# Patient Record
Sex: FEMALE | Race: BLACK OR AFRICAN AMERICAN | NOT HISPANIC OR LATINO | Employment: OTHER | URBAN - METROPOLITAN AREA
[De-identification: names, ages, dates, MRNs, and addresses within clinical notes are randomized per-mention and may not be internally consistent; named-entity substitution may affect disease eponyms.]

---

## 2023-05-03 ENCOUNTER — HOSPITAL ENCOUNTER (INPATIENT)
Facility: HOSPITAL | Age: 48
LOS: 13 days | Discharge: HOME HEALTH | DRG: 064 | End: 2023-05-16
Attending: FAMILY MEDICINE | Admitting: INTERNAL MEDICINE
Payer: MEDICAID

## 2023-05-03 DIAGNOSIS — R79.89 ELEVATED TROPONIN: ICD-10-CM

## 2023-05-03 DIAGNOSIS — R65.20 SEVERE SEPSIS: ICD-10-CM

## 2023-05-03 DIAGNOSIS — A41.9 SEVERE SEPSIS: ICD-10-CM

## 2023-05-03 DIAGNOSIS — R41.82 ALTERED MENTAL STATUS: ICD-10-CM

## 2023-05-03 DIAGNOSIS — L02.212 BACK ABSCESS: ICD-10-CM

## 2023-05-03 DIAGNOSIS — I63.40 CEREBROVASCULAR ACCIDENT (CVA) DUE TO EMBOLISM OF CEREBRAL ARTERY: Primary | ICD-10-CM

## 2023-05-03 DIAGNOSIS — I21.4 NSTEMI (NON-ST ELEVATED MYOCARDIAL INFARCTION): ICD-10-CM

## 2023-05-03 DIAGNOSIS — A41.9 SEPSIS, DUE TO UNSPECIFIED ORGANISM, UNSPECIFIED WHETHER ACUTE ORGAN DYSFUNCTION PRESENT: ICD-10-CM

## 2023-05-03 DIAGNOSIS — N17.9 AKI (ACUTE KIDNEY INJURY): ICD-10-CM

## 2023-05-03 DIAGNOSIS — R74.01 TRANSAMINITIS: ICD-10-CM

## 2023-05-03 DIAGNOSIS — G93.41 ACUTE METABOLIC ENCEPHALOPATHY: ICD-10-CM

## 2023-05-03 DIAGNOSIS — I63.9 EMBOLIC STROKE: ICD-10-CM

## 2023-05-03 LAB
BASOPHILS # BLD AUTO: 0.1 K/UL (ref 0–0.2)
BASOPHILS NFR BLD: 0.5 % (ref 0–1.9)
BNP SERPL-MCNC: 117 PG/ML (ref 0–99)
DIFFERENTIAL METHOD: ABNORMAL
EOSINOPHIL # BLD AUTO: 0.1 K/UL (ref 0–0.5)
EOSINOPHIL NFR BLD: 0.4 % (ref 0–8)
ERYTHROCYTE [DISTWIDTH] IN BLOOD BY AUTOMATED COUNT: 13.9 % (ref 11.5–14.5)
HCT VFR BLD AUTO: 28.4 % (ref 37–48.5)
HGB BLD-MCNC: 9.3 G/DL (ref 12–16)
IMM GRANULOCYTES # BLD AUTO: 0.56 K/UL (ref 0–0.04)
IMM GRANULOCYTES NFR BLD AUTO: 2.7 % (ref 0–0.5)
LACTATE SERPL-SCNC: 1.3 MMOL/L (ref 0.5–2.2)
LYMPHOCYTES # BLD AUTO: 1.4 K/UL (ref 1–4.8)
LYMPHOCYTES NFR BLD: 6.8 % (ref 18–48)
MCH RBC QN AUTO: 21.6 PG (ref 27–31)
MCHC RBC AUTO-ENTMCNC: 32.7 G/DL (ref 32–36)
MCV RBC AUTO: 66 FL (ref 82–98)
MONOCYTES # BLD AUTO: 0.8 K/UL (ref 0.3–1)
MONOCYTES NFR BLD: 4.1 % (ref 4–15)
NEUTROPHILS # BLD AUTO: 17.7 K/UL (ref 1.8–7.7)
NEUTROPHILS NFR BLD: 85.5 % (ref 38–73)
NRBC BLD-RTO: 1 /100 WBC
PLATELET # BLD AUTO: 499 K/UL (ref 150–450)
PMV BLD AUTO: 11.4 FL (ref 9.2–12.9)
RBC # BLD AUTO: 4.3 M/UL (ref 4–5.4)
TROPONIN I SERPL DL<=0.01 NG/ML-MCNC: 0.26 NG/ML (ref 0–0.03)
WBC # BLD AUTO: 20.65 K/UL (ref 3.9–12.7)

## 2023-05-03 PROCEDURE — 87040 BLOOD CULTURE FOR BACTERIA: CPT | Mod: 59 | Performed by: FAMILY MEDICINE

## 2023-05-03 PROCEDURE — 84484 ASSAY OF TROPONIN QUANT: CPT | Performed by: FAMILY MEDICINE

## 2023-05-03 PROCEDURE — 51701 INSERT BLADDER CATHETER: CPT

## 2023-05-03 PROCEDURE — 25000003 PHARM REV CODE 250: Performed by: FAMILY MEDICINE

## 2023-05-03 PROCEDURE — 96375 TX/PRO/DX INJ NEW DRUG ADDON: CPT

## 2023-05-03 PROCEDURE — 11000001 HC ACUTE MED/SURG PRIVATE ROOM

## 2023-05-03 PROCEDURE — 93005 ELECTROCARDIOGRAM TRACING: CPT

## 2023-05-03 PROCEDURE — 80053 COMPREHEN METABOLIC PANEL: CPT | Performed by: FAMILY MEDICINE

## 2023-05-03 PROCEDURE — 93010 EKG 12-LEAD: ICD-10-PCS | Mod: ,,, | Performed by: STUDENT IN AN ORGANIZED HEALTH CARE EDUCATION/TRAINING PROGRAM

## 2023-05-03 PROCEDURE — 99285 EMERGENCY DEPT VISIT HI MDM: CPT | Mod: 25

## 2023-05-03 PROCEDURE — 93010 ELECTROCARDIOGRAM REPORT: CPT | Mod: ,,, | Performed by: STUDENT IN AN ORGANIZED HEALTH CARE EDUCATION/TRAINING PROGRAM

## 2023-05-03 PROCEDURE — 63600175 PHARM REV CODE 636 W HCPCS: Performed by: FAMILY MEDICINE

## 2023-05-03 PROCEDURE — 84145 PROCALCITONIN (PCT): CPT | Performed by: FAMILY MEDICINE

## 2023-05-03 PROCEDURE — 85025 COMPLETE CBC W/AUTO DIFF WBC: CPT | Performed by: FAMILY MEDICINE

## 2023-05-03 PROCEDURE — 82962 GLUCOSE BLOOD TEST: CPT

## 2023-05-03 PROCEDURE — 96365 THER/PROPH/DIAG IV INF INIT: CPT

## 2023-05-03 PROCEDURE — 83605 ASSAY OF LACTIC ACID: CPT | Performed by: FAMILY MEDICINE

## 2023-05-03 PROCEDURE — 83880 ASSAY OF NATRIURETIC PEPTIDE: CPT | Performed by: FAMILY MEDICINE

## 2023-05-03 RX ORDER — HYDRALAZINE HYDROCHLORIDE 20 MG/ML
10 INJECTION INTRAMUSCULAR; INTRAVENOUS
Status: COMPLETED | OUTPATIENT
Start: 2023-05-03 | End: 2023-05-03

## 2023-05-03 RX ADMIN — PIPERACILLIN SODIUM AND TAZOBACTAM SODIUM 4.5 G: 4; .5 INJECTION, POWDER, FOR SOLUTION INTRAVENOUS at 11:05

## 2023-05-03 RX ADMIN — SODIUM CHLORIDE 1000 ML: 9 INJECTION, SOLUTION INTRAVENOUS at 11:05

## 2023-05-03 RX ADMIN — HYDRALAZINE HYDROCHLORIDE 10 MG: 20 INJECTION, SOLUTION INTRAMUSCULAR; INTRAVENOUS at 11:05

## 2023-05-03 NOTE — Clinical Note
Diagnosis: Sepsis, due to unspecified organism, unspecified whether acute organ dysfunction present [[id number]]   Future Attending Provider: DEANNA POPE [[id number]]   Admitting Provider:: DEANNA POPE [[id number]]

## 2023-05-04 PROBLEM — R65.20 SEVERE SEPSIS: Status: ACTIVE | Noted: 2023-05-04

## 2023-05-04 PROBLEM — G93.41 ACUTE METABOLIC ENCEPHALOPATHY: Status: ACTIVE | Noted: 2023-05-04

## 2023-05-04 PROBLEM — R79.89 ELEVATED TROPONIN: Status: ACTIVE | Noted: 2023-05-04

## 2023-05-04 PROBLEM — R79.89 ELEVATED LFTS: Status: ACTIVE | Noted: 2023-05-04

## 2023-05-04 PROBLEM — L02.212 BACK ABSCESS: Status: ACTIVE | Noted: 2023-05-04

## 2023-05-04 PROBLEM — R73.9 HYPERGLYCEMIA: Status: ACTIVE | Noted: 2023-05-04

## 2023-05-04 PROBLEM — A41.9 SEVERE SEPSIS: Status: ACTIVE | Noted: 2023-05-04

## 2023-05-04 PROBLEM — E87.29 HIGH ANION GAP METABOLIC ACIDOSIS: Status: ACTIVE | Noted: 2023-05-04

## 2023-05-04 PROBLEM — E66.01 MORBID OBESITY WITH BMI OF 45.0-49.9, ADULT: Status: ACTIVE | Noted: 2023-05-04

## 2023-05-04 PROBLEM — N17.9 AKI (ACUTE KIDNEY INJURY): Status: ACTIVE | Noted: 2023-05-04

## 2023-05-04 PROBLEM — E11.65 TYPE 2 DIABETES MELLITUS WITH HYPERGLYCEMIA: Status: ACTIVE | Noted: 2023-05-04

## 2023-05-04 LAB
ALBUMIN SERPL BCP-MCNC: 2.1 G/DL (ref 3.5–5.2)
ALBUMIN SERPL BCP-MCNC: 2.3 G/DL (ref 3.5–5.2)
ALP SERPL-CCNC: 155 U/L (ref 55–135)
ALP SERPL-CCNC: 172 U/L (ref 55–135)
ALT SERPL W/O P-5'-P-CCNC: 151 U/L (ref 10–44)
ALT SERPL W/O P-5'-P-CCNC: 168 U/L (ref 10–44)
AMPHET+METHAMPHET UR QL: NEGATIVE
ANION GAP SERPL CALC-SCNC: 17 MMOL/L (ref 8–16)
ANION GAP SERPL CALC-SCNC: 17 MMOL/L (ref 8–16)
ANION GAP SERPL CALC-SCNC: 20 MMOL/L (ref 8–16)
AORTIC ROOT ANNULUS: 2.97 CM
ASCENDING AORTA: 3.06 CM
AST SERPL-CCNC: 350 U/L (ref 10–40)
AST SERPL-CCNC: 449 U/L (ref 10–40)
AV INDEX (PROSTH): 0.84
AV MEAN GRADIENT: 11 MMHG
AV PEAK GRADIENT: 14 MMHG
AV VALVE AREA: 2.66 CM2
AV VELOCITY RATIO: 0.79
BACTERIA #/AREA URNS HPF: ABNORMAL /HPF
BARBITURATES UR QL SCN>200 NG/ML: NEGATIVE
BASOPHILS # BLD AUTO: 0.09 K/UL (ref 0–0.2)
BASOPHILS NFR BLD: 0.4 % (ref 0–1.9)
BENZODIAZ UR QL SCN>200 NG/ML: NEGATIVE
BILIRUB SERPL-MCNC: 1.3 MG/DL (ref 0.1–1)
BILIRUB SERPL-MCNC: 1.3 MG/DL (ref 0.1–1)
BILIRUB UR QL STRIP: NEGATIVE
BSA FOR ECHO PROCEDURE: 2.39 M2
BUN SERPL-MCNC: 60 MG/DL (ref 6–20)
BUN SERPL-MCNC: 61 MG/DL (ref 6–20)
BUN SERPL-MCNC: 65 MG/DL (ref 6–20)
BZE UR QL SCN: NEGATIVE
C3 SERPL-MCNC: 151 MG/DL (ref 50–180)
C4 SERPL-MCNC: 12 MG/DL (ref 11–44)
CALCIUM SERPL-MCNC: 8.4 MG/DL (ref 8.7–10.5)
CALCIUM SERPL-MCNC: 8.7 MG/DL (ref 8.7–10.5)
CALCIUM SERPL-MCNC: 9.5 MG/DL (ref 8.7–10.5)
CANNABINOIDS UR QL SCN: NEGATIVE
CHLORIDE SERPL-SCNC: 103 MMOL/L (ref 95–110)
CHLORIDE SERPL-SCNC: 106 MMOL/L (ref 95–110)
CHLORIDE SERPL-SCNC: 108 MMOL/L (ref 95–110)
CHOLEST SERPL-MCNC: 142 MG/DL (ref 120–199)
CHOLEST/HDLC SERPL: 23.7 {RATIO} (ref 2–5)
CK SERPL-CCNC: 3423 U/L (ref 20–180)
CLARITY UR: ABNORMAL
CO2 SERPL-SCNC: 21 MMOL/L (ref 23–29)
COLOR UR: YELLOW
CREAT SERPL-MCNC: 2.7 MG/DL (ref 0.5–1.4)
CREAT SERPL-MCNC: 3.1 MG/DL (ref 0.5–1.4)
CREAT SERPL-MCNC: 3.5 MG/DL (ref 0.5–1.4)
CREAT UR-MCNC: 133.9 MG/DL (ref 15–325)
CREAT UR-MCNC: 133.9 MG/DL (ref 15–325)
CREAT UR-MCNC: 213.6 MG/DL (ref 15–325)
CV ECHO LV RWT: 0.73 CM
DIFFERENTIAL METHOD: ABNORMAL
DOP CALC AO PEAK VEL: 1.89 M/S
DOP CALC AO VTI: 30.9 CM
DOP CALC LVOT AREA: 3.2 CM2
DOP CALC LVOT DIAMETER: 2.01 CM
DOP CALC LVOT PEAK VEL: 1.49 M/S
DOP CALC LVOT STROKE VOLUME: 82.14 CM3
DOP CALC RVOT PEAK VEL: 1.26 M/S
DOP CALC RVOT VTI: 23.8 CM
DOP CALCLVOT PEAK VEL VTI: 25.9 CM
E WAVE DECELERATION TIME: 245.08 MSEC
E/A RATIO: 1.04
E/E' RATIO: 11.53 M/S
ECHO LV POSTERIOR WALL: 1.71 CM (ref 0.6–1.1)
EJECTION FRACTION: 65 %
EOSINOPHIL # BLD AUTO: 0.1 K/UL (ref 0–0.5)
EOSINOPHIL NFR BLD: 0.4 % (ref 0–8)
EOSINOPHIL URNS QL WRIGHT STN: NORMAL
ERYTHROCYTE [DISTWIDTH] IN BLOOD BY AUTOMATED COUNT: 13.5 % (ref 11.5–14.5)
EST. GFR  (NO RACE VARIABLE): 16 ML/MIN/1.73 M^2
EST. GFR  (NO RACE VARIABLE): 18 ML/MIN/1.73 M^2
EST. GFR  (NO RACE VARIABLE): 21 ML/MIN/1.73 M^2
ESTIMATED AVG GLUCOSE: 157 MG/DL (ref 68–131)
FRACTIONAL SHORTENING: 36 % (ref 28–44)
GLUCOSE SERPL-MCNC: 162 MG/DL (ref 70–110)
GLUCOSE SERPL-MCNC: 172 MG/DL (ref 70–110)
GLUCOSE SERPL-MCNC: 176 MG/DL (ref 70–110)
GLUCOSE UR QL STRIP: NEGATIVE
HAV IGM SERPL QL IA: NORMAL
HBA1C MFR BLD: 7.1 % (ref 4–5.6)
HBV CORE IGM SERPL QL IA: NORMAL
HBV SURFACE AG SERPL QL IA: NORMAL
HCT VFR BLD AUTO: 25.3 % (ref 37–48.5)
HCV AB SERPL QL IA: NORMAL
HDLC SERPL-MCNC: 6 MG/DL (ref 40–75)
HDLC SERPL: 4.2 % (ref 20–50)
HGB BLD-MCNC: 8.5 G/DL (ref 12–16)
HGB UR QL STRIP: ABNORMAL
HIV 1+2 AB+HIV1 P24 AG SERPL QL IA: NEGATIVE
HYALINE CASTS #/AREA URNS LPF: 3 /LPF
IMM GRANULOCYTES # BLD AUTO: 0.96 K/UL (ref 0–0.04)
IMM GRANULOCYTES NFR BLD AUTO: 4.7 % (ref 0–0.5)
INTERVENTRICULAR SEPTUM: 1.66 CM (ref 0.6–1.1)
IVC DIAMETER: 1.4 CM
IVRT: 79.92 MSEC
KETONES UR QL STRIP: NEGATIVE
LA MAJOR: 4.79 CM
LA MINOR: 5.09 CM
LA WIDTH: 3.8 CM
LDH SERPL L TO P-CCNC: 576 U/L (ref 110–260)
LDLC SERPL CALC-MCNC: 80.8 MG/DL (ref 63–159)
LEFT ATRIUM SIZE: 3.75 CM
LEFT ATRIUM VOLUME INDEX: 26.6 ML/M2
LEFT ATRIUM VOLUME: 59.78 CM3
LEFT INTERNAL DIMENSION IN SYSTOLE: 3.01 CM (ref 2.1–4)
LEFT VENTRICLE DIASTOLIC VOLUME INDEX: 44.89 ML/M2
LEFT VENTRICLE DIASTOLIC VOLUME: 101.01 ML
LEFT VENTRICLE MASS INDEX: 155 G/M2
LEFT VENTRICLE SYSTOLIC VOLUME INDEX: 15.7 ML/M2
LEFT VENTRICLE SYSTOLIC VOLUME: 35.32 ML
LEFT VENTRICULAR INTERNAL DIMENSION IN DIASTOLE: 4.67 CM (ref 3.5–6)
LEFT VENTRICULAR MASS: 348.27 G
LEUKOCYTE ESTERASE UR QL STRIP: ABNORMAL
LV LATERAL E/E' RATIO: 9.8 M/S
LV SEPTAL E/E' RATIO: 14 M/S
LVOT MG: 7.71 MMHG
LVOT MV: 1.39 CM/S
LYMPHOCYTES # BLD AUTO: 1.4 K/UL (ref 1–4.8)
LYMPHOCYTES NFR BLD: 7 % (ref 18–48)
MAGNESIUM SERPL-MCNC: 2.6 MG/DL (ref 1.6–2.6)
MCH RBC QN AUTO: 21.7 PG (ref 27–31)
MCHC RBC AUTO-ENTMCNC: 33.6 G/DL (ref 32–36)
MCV RBC AUTO: 65 FL (ref 82–98)
METHADONE UR QL SCN>300 NG/ML: NEGATIVE
MICROSCOPIC COMMENT: ABNORMAL
MONOCYTES # BLD AUTO: 0.9 K/UL (ref 0.3–1)
MONOCYTES NFR BLD: 4.5 % (ref 4–15)
MV PEAK A VEL: 0.94 M/S
MV PEAK E VEL: 0.98 M/S
MV STENOSIS PRESSURE HALF TIME: 71.07 MS
MV VALVE AREA P 1/2 METHOD: 3.1 CM2
NEUTROPHILS # BLD AUTO: 17 K/UL (ref 1.8–7.7)
NEUTROPHILS NFR BLD: 83 % (ref 38–73)
NITRITE UR QL STRIP: NEGATIVE
NONHDLC SERPL-MCNC: 136 MG/DL
NRBC BLD-RTO: 1 /100 WBC
OPIATES UR QL SCN: ABNORMAL
PCP UR QL SCN>25 NG/ML: NEGATIVE
PH UR STRIP: 5 [PH] (ref 5–8)
PISA TR MAX VEL: 2.44 M/S
PLATELET # BLD AUTO: 489 K/UL (ref 150–450)
PMV BLD AUTO: 10.9 FL (ref 9.2–12.9)
POCT GLUCOSE: 167 MG/DL (ref 70–110)
POCT GLUCOSE: 170 MG/DL (ref 70–110)
POCT GLUCOSE: 194 MG/DL (ref 70–110)
POCT GLUCOSE: 197 MG/DL (ref 70–110)
POCT GLUCOSE: 202 MG/DL (ref 70–110)
POTASSIUM SERPL-SCNC: 3.8 MMOL/L (ref 3.5–5.1)
POTASSIUM SERPL-SCNC: 3.9 MMOL/L (ref 3.5–5.1)
POTASSIUM SERPL-SCNC: 4.6 MMOL/L (ref 3.5–5.1)
PROCALCITONIN SERPL IA-MCNC: 4.89 NG/ML
PROT SERPL-MCNC: 7.7 G/DL (ref 6–8.4)
PROT SERPL-MCNC: 8.6 G/DL (ref 6–8.4)
PROT UR QL STRIP: ABNORMAL
PROT UR-MCNC: 31 MG/DL (ref 0–15)
PROT/CREAT UR: 0.23 MG/G{CREAT} (ref 0–0.2)
PV MEAN GRADIENT: 4.54 MMHG
RA MAJOR: 3.88 CM
RA PRESSURE: 3 MMHG
RA WIDTH: 2.5 CM
RBC # BLD AUTO: 3.91 M/UL (ref 4–5.4)
RBC #/AREA URNS HPF: 1 /HPF (ref 0–4)
RETICS/RBC NFR AUTO: 0.7 % (ref 0.5–2.5)
SODIUM SERPL-SCNC: 144 MMOL/L (ref 136–145)
SODIUM SERPL-SCNC: 144 MMOL/L (ref 136–145)
SODIUM SERPL-SCNC: 146 MMOL/L (ref 136–145)
SODIUM UR-SCNC: 44 MMOL/L (ref 20–250)
SP GR UR STRIP: 1.02 (ref 1–1.03)
SQUAMOUS #/AREA URNS HPF: 18 /HPF
STJ: 3.17 CM
TDI LATERAL: 0.1 M/S
TDI SEPTAL: 0.07 M/S
TDI: 0.09 M/S
TOXICOLOGY INFORMATION: ABNORMAL
TR MAX PG: 24 MMHG
TRICUSPID ANNULAR PLANE SYSTOLIC EXCURSION: 2 CM
TRIGL SERPL-MCNC: 276 MG/DL (ref 30–150)
TROPONIN I SERPL DL<=0.01 NG/ML-MCNC: 0.19 NG/ML (ref 0–0.03)
TROPONIN I SERPL DL<=0.01 NG/ML-MCNC: 0.28 NG/ML (ref 0–0.03)
TSH SERPL DL<=0.005 MIU/L-ACNC: 2.69 UIU/ML (ref 0.4–4)
TV REST PULMONARY ARTERY PRESSURE: 27 MMHG
URN SPEC COLLECT METH UR: ABNORMAL
UROBILINOGEN UR STRIP-ACNC: ABNORMAL EU/DL
WBC # BLD AUTO: 20.45 K/UL (ref 3.9–12.7)
WBC #/AREA URNS HPF: 14 /HPF (ref 0–5)
WBC CLUMPS URNS QL MICRO: ABNORMAL

## 2023-05-04 PROCEDURE — 99223 1ST HOSP IP/OBS HIGH 75: CPT | Mod: ,,, | Performed by: COLON & RECTAL SURGERY

## 2023-05-04 PROCEDURE — 87205 SMEAR GRAM STAIN: CPT | Performed by: INTERNAL MEDICINE

## 2023-05-04 PROCEDURE — 25000003 PHARM REV CODE 250: Performed by: INTERNAL MEDICINE

## 2023-05-04 PROCEDURE — 36415 COLL VENOUS BLD VENIPUNCTURE: CPT | Performed by: INTERNAL MEDICINE

## 2023-05-04 PROCEDURE — 85045 AUTOMATED RETICULOCYTE COUNT: CPT | Performed by: INTERNAL MEDICINE

## 2023-05-04 PROCEDURE — 80053 COMPREHEN METABOLIC PANEL: CPT | Performed by: INTERNAL MEDICINE

## 2023-05-04 PROCEDURE — 84443 ASSAY THYROID STIM HORMONE: CPT | Performed by: INTERNAL MEDICINE

## 2023-05-04 PROCEDURE — 25000003 PHARM REV CODE 250: Performed by: FAMILY MEDICINE

## 2023-05-04 PROCEDURE — 97530 THERAPEUTIC ACTIVITIES: CPT

## 2023-05-04 PROCEDURE — 83036 HEMOGLOBIN GLYCOSYLATED A1C: CPT | Performed by: INTERNAL MEDICINE

## 2023-05-04 PROCEDURE — 80074 ACUTE HEPATITIS PANEL: CPT | Performed by: INTERNAL MEDICINE

## 2023-05-04 PROCEDURE — 63600175 PHARM REV CODE 636 W HCPCS: Performed by: INTERNAL MEDICINE

## 2023-05-04 PROCEDURE — 80048 BASIC METABOLIC PNL TOTAL CA: CPT | Mod: XB | Performed by: INTERNAL MEDICINE

## 2023-05-04 PROCEDURE — 87086 URINE CULTURE/COLONY COUNT: CPT | Performed by: FAMILY MEDICINE

## 2023-05-04 PROCEDURE — 84300 ASSAY OF URINE SODIUM: CPT | Performed by: INTERNAL MEDICINE

## 2023-05-04 PROCEDURE — 86038 ANTINUCLEAR ANTIBODIES: CPT | Performed by: INTERNAL MEDICINE

## 2023-05-04 PROCEDURE — 99223 PR INITIAL HOSPITAL CARE,LEVL III: ICD-10-PCS | Mod: ,,, | Performed by: COLON & RECTAL SURGERY

## 2023-05-04 PROCEDURE — 83735 ASSAY OF MAGNESIUM: CPT | Performed by: INTERNAL MEDICINE

## 2023-05-04 PROCEDURE — 80061 LIPID PANEL: CPT | Performed by: INTERNAL MEDICINE

## 2023-05-04 PROCEDURE — 99223 PR INITIAL HOSPITAL CARE,LEVL III: ICD-10-PCS | Mod: ,,, | Performed by: INTERNAL MEDICINE

## 2023-05-04 PROCEDURE — 87389 HIV-1 AG W/HIV-1&-2 AB AG IA: CPT | Performed by: INTERNAL MEDICINE

## 2023-05-04 PROCEDURE — 80307 DRUG TEST PRSMV CHEM ANLYZR: CPT | Performed by: FAMILY MEDICINE

## 2023-05-04 PROCEDURE — 92610 EVALUATE SWALLOWING FUNCTION: CPT

## 2023-05-04 PROCEDURE — 82570 ASSAY OF URINE CREATININE: CPT | Performed by: INTERNAL MEDICINE

## 2023-05-04 PROCEDURE — 81000 URINALYSIS NONAUTO W/SCOPE: CPT | Mod: 59 | Performed by: FAMILY MEDICINE

## 2023-05-04 PROCEDURE — 92523 SPEECH SOUND LANG COMPREHEN: CPT

## 2023-05-04 PROCEDURE — 83615 LACTATE (LD) (LDH) ENZYME: CPT | Performed by: INTERNAL MEDICINE

## 2023-05-04 PROCEDURE — 84484 ASSAY OF TROPONIN QUANT: CPT | Mod: 91 | Performed by: INTERNAL MEDICINE

## 2023-05-04 PROCEDURE — 97162 PT EVAL MOD COMPLEX 30 MIN: CPT

## 2023-05-04 PROCEDURE — 97166 OT EVAL MOD COMPLEX 45 MIN: CPT

## 2023-05-04 PROCEDURE — 99223 1ST HOSP IP/OBS HIGH 75: CPT | Mod: ,,, | Performed by: INTERNAL MEDICINE

## 2023-05-04 PROCEDURE — 86160 COMPLEMENT ANTIGEN: CPT | Mod: 59 | Performed by: INTERNAL MEDICINE

## 2023-05-04 PROCEDURE — 85025 COMPLETE CBC W/AUTO DIFF WBC: CPT | Performed by: INTERNAL MEDICINE

## 2023-05-04 PROCEDURE — 86160 COMPLEMENT ANTIGEN: CPT | Performed by: INTERNAL MEDICINE

## 2023-05-04 PROCEDURE — 82550 ASSAY OF CK (CPK): CPT | Performed by: INTERNAL MEDICINE

## 2023-05-04 PROCEDURE — 21400001 HC TELEMETRY ROOM

## 2023-05-04 RX ORDER — HYDRALAZINE HYDROCHLORIDE 20 MG/ML
10 INJECTION INTRAMUSCULAR; INTRAVENOUS EVERY 8 HOURS PRN
Status: DISCONTINUED | OUTPATIENT
Start: 2023-05-04 | End: 2023-05-06

## 2023-05-04 RX ORDER — SODIUM CHLORIDE 9 MG/ML
INJECTION, SOLUTION INTRAVENOUS CONTINUOUS
Status: ACTIVE | OUTPATIENT
Start: 2023-05-04 | End: 2023-05-05

## 2023-05-04 RX ORDER — ATORVASTATIN CALCIUM 40 MG/1
40 TABLET, FILM COATED ORAL DAILY
Status: DISCONTINUED | OUTPATIENT
Start: 2023-05-04 | End: 2023-05-04

## 2023-05-04 RX ORDER — ONDANSETRON 2 MG/ML
4 INJECTION INTRAMUSCULAR; INTRAVENOUS EVERY 8 HOURS PRN
Status: DISCONTINUED | OUTPATIENT
Start: 2023-05-04 | End: 2023-05-16 | Stop reason: HOSPADM

## 2023-05-04 RX ORDER — SODIUM CHLORIDE 0.9 % (FLUSH) 0.9 %
10 SYRINGE (ML) INJECTION
Status: DISCONTINUED | OUTPATIENT
Start: 2023-05-04 | End: 2023-05-16 | Stop reason: HOSPADM

## 2023-05-04 RX ORDER — ACETAMINOPHEN 325 MG/1
650 TABLET ORAL EVERY 6 HOURS PRN
Status: DISCONTINUED | OUTPATIENT
Start: 2023-05-04 | End: 2023-05-16 | Stop reason: HOSPADM

## 2023-05-04 RX ORDER — HEPARIN SODIUM 5000 [USP'U]/ML
5000 INJECTION, SOLUTION INTRAVENOUS; SUBCUTANEOUS EVERY 12 HOURS
Status: DISCONTINUED | OUTPATIENT
Start: 2023-05-04 | End: 2023-05-16 | Stop reason: HOSPADM

## 2023-05-04 RX ORDER — INSULIN ASPART 100 [IU]/ML
0-5 INJECTION, SOLUTION INTRAVENOUS; SUBCUTANEOUS EVERY 6 HOURS PRN
Status: DISCONTINUED | OUTPATIENT
Start: 2023-05-04 | End: 2023-05-04

## 2023-05-04 RX ORDER — GLUCAGON 1 MG
1 KIT INJECTION
Status: DISCONTINUED | OUTPATIENT
Start: 2023-05-04 | End: 2023-05-04

## 2023-05-04 RX ORDER — HYDRALAZINE HYDROCHLORIDE 20 MG/ML
10 INJECTION INTRAMUSCULAR; INTRAVENOUS EVERY 8 HOURS PRN
Status: DISCONTINUED | OUTPATIENT
Start: 2023-05-04 | End: 2023-05-04

## 2023-05-04 RX ORDER — ASPIRIN 81 MG/1
81 TABLET ORAL DAILY
Status: DISCONTINUED | OUTPATIENT
Start: 2023-05-04 | End: 2023-05-04

## 2023-05-04 RX ORDER — CLINDAMYCIN PHOSPHATE 600 MG/50ML
600 INJECTION, SOLUTION INTRAVENOUS
Status: DISCONTINUED | OUTPATIENT
Start: 2023-05-04 | End: 2023-05-12

## 2023-05-04 RX ADMIN — SODIUM CHLORIDE: 9 INJECTION, SOLUTION INTRAVENOUS at 04:05

## 2023-05-04 RX ADMIN — CLINDAMYCIN PHOSPHATE 600 MG: 600 INJECTION, SOLUTION INTRAVENOUS at 06:05

## 2023-05-04 RX ADMIN — HEPARIN SODIUM 5000 UNITS: 5000 INJECTION INTRAVENOUS; SUBCUTANEOUS at 09:05

## 2023-05-04 RX ADMIN — CLINDAMYCIN PHOSPHATE 600 MG: 600 INJECTION, SOLUTION INTRAVENOUS at 04:05

## 2023-05-04 RX ADMIN — HYDRALAZINE HYDROCHLORIDE 10 MG: 20 INJECTION, SOLUTION INTRAMUSCULAR; INTRAVENOUS at 03:05

## 2023-05-04 RX ADMIN — PIPERACILLIN SODIUM AND TAZOBACTAM SODIUM 4.5 G: 4; .5 INJECTION, POWDER, FOR SOLUTION INTRAVENOUS at 09:05

## 2023-05-04 RX ADMIN — SODIUM CHLORIDE: 9 INJECTION, SOLUTION INTRAVENOUS at 01:05

## 2023-05-04 RX ADMIN — SODIUM CHLORIDE: 9 INJECTION, SOLUTION INTRAVENOUS at 02:05

## 2023-05-04 RX ADMIN — INSULIN ASPART 2 UNITS: 100 INJECTION, SOLUTION INTRAVENOUS; SUBCUTANEOUS at 09:05

## 2023-05-04 RX ADMIN — SODIUM CHLORIDE 1641 ML: 9 INJECTION, SOLUTION INTRAVENOUS at 02:05

## 2023-05-04 NOTE — ASSESSMENT & PLAN NOTE
-, , .  Total bilirubin 1.3.    -acute hepatitis panel.    -follow up on abdominal ultrasound.    -repeat CMP

## 2023-05-04 NOTE — ED PROVIDER NOTES
"SCRIBE #1 NOTE: I, Nickie Giovanni, am scribing for, and in the presence of, Romy Garcia MD. I have scribed the entire note.       History     Chief Complaint   Patient presents with    Altered Mental Status     Found at home with GCS 11, covered in feces and vomit. Family has not seen or heard from her in 2 days. Hx previous CVA     Review of patient's allergies indicates:  No Known Allergies      History of Present Illness     HPI    5/3/2023, 10:01 PM  Limited HPI and ROS due to pt's acuity of condition  Partial history obtained from EMS      History of Present Illness: Kenia Roper is a 47 y.o. female patient with a PMHx of CVA, ovarian cancer, and DM who presents to the Emergency Department for evaluation of altered mental status. Symptoms are constant and moderate in severity. Pt was found at home with a GCS of 11, covered in her own feces and vomit. EMS reports that her home was also "covered in feces". Family reports that pt was last known well 2 days ago.       Arrival mode: EMS    PCP: Ross Perdomo MD        Past Medical History:  Past Medical History:   Diagnosis Date    Diabetes mellitus     Ovarian cancer     Stroke        Past Surgical History:  History reviewed. No pertinent surgical history.      Family History:  History reviewed. No pertinent family history.    Social History:  Social History     Tobacco Use    Smoking status: Unknown    Smokeless tobacco: Not on file   Substance and Sexual Activity    Alcohol use: Not on file    Drug use: Not on file    Sexual activity: Not on file        Review of Systems     Review of Systems   Unable to perform ROS: Acuity of condition      Physical Exam     Initial Vitals [05/03/23 2149]   BP Pulse Resp Temp SpO2   (!) 198/127 100 19 98.9 °F (37.2 °C) (!) 94 %      MAP       --          Physical Exam  Nursing Notes and Vital Signs Reviewed.  Constitutional: Patient is in mild distress. Well-developed and well-nourished.  Head: Atraumatic. " "Normocephalic.  Eyes: PERRL. EOM intact. Conjunctivae are not pale. No scleral icterus.  ENT: Mucous membranes are moist. Oropharynx is clear and symmetric.    Neck: Supple. Full ROM. No lymphadenopathy.  Cardiovascular: Regular rate. Regular rhythm. No murmurs, rubs, or gallops. Distal pulses are 2+ and symmetric.  Pulmonary/Chest: No respiratory distress. Clear to auscultation bilaterally. No wheezing or rales.  Abdominal: Soft and non-distended.  There is no tenderness.  No rebound, guarding, or rigidity. Good bowel sounds.  Genitourinary: No CVA tenderness  Musculoskeletal: Moves all extremities. No obvious deformities. No edema. No calf tenderness.  Skin: Warm and dry.  Neurological:  Awake but not following commands. Moaning with questioning.   Psychiatric: Unable to perform due to pt's acuity of condition.     ED Course   Procedures  ED Vital Signs:  Vitals:    05/04/23 1547 05/04/23 1548 05/04/23 1657 05/04/23 1718   BP:  (!) 210/98 (!) 134/99    Pulse: 87 87 89 88   Resp:  18     Temp:  98 °F (36.7 °C)     TempSrc:  Oral     SpO2:  (!) 93%     Weight:       Height:        05/04/23 1916 05/05/23 0031 05/05/23 0425 05/05/23 0724   BP: (!) 185/81 (!) 194/86 (!) 189/86    Pulse: 89 87 93 78   Resp: 20 20 20    Temp: 99 °F (37.2 °C) 99.1 °F (37.3 °C) 97.1 °F (36.2 °C)    TempSrc:  Oral Oral    SpO2: 100% 96% 97%    Weight:       Height:        05/05/23 0737 05/05/23 0835 05/05/23 0854 05/05/23 0933   BP: (!) 179/81      Pulse: 79   71   Resp: 20      Temp: 98.1 °F (36.7 °C)      TempSrc: Oral      SpO2: (!) 92%      Weight:  126.1 kg (278 lb)     Height:  5' 4" (1.626 m) 5' 4" (1.626 m)     05/05/23 1116 05/05/23 1138 05/05/23 1320   BP: (!) 198/93     Pulse: 79 80 79   Resp: 18     Temp: 96.7 °F (35.9 °C)     TempSrc: Axillary     SpO2: 99%     Weight:      Height:          Abnormal Lab Results:  Labs Reviewed   CBC W/ AUTO DIFFERENTIAL - Abnormal; Notable for the following components:       Result Value    WBC " 20.65 (*)     Hemoglobin 9.3 (*)     Hematocrit 28.4 (*)     MCV 66 (*)     MCH 21.6 (*)     Platelets 499 (*)     Immature Granulocytes 2.7 (*)     Gran # (ANC) 17.7 (*)     Immature Grans (Abs) 0.56 (*)     nRBC 1 (*)     Gran % 85.5 (*)     Lymph % 6.8 (*)     All other components within normal limits   URINALYSIS, REFLEX TO URINE CULTURE - Abnormal; Notable for the following components:    Appearance, UA Hazy (*)     Protein, UA 1+ (*)     Occult Blood UA 2+ (*)     Urobilinogen, UA 4.0-6.0 (*)     Leukocytes, UA Trace (*)     All other components within normal limits    Narrative:     Specimen Source->Urine   B-TYPE NATRIURETIC PEPTIDE - Abnormal; Notable for the following components:     (*)     All other components within normal limits   TROPONIN I - Abnormal; Notable for the following components:    Troponin I 0.259 (*)     All other components within normal limits   PROCALCITONIN - Abnormal; Notable for the following components:    Procalcitonin 4.89 (*)     All other components within normal limits   DRUG SCREEN PANEL, URINE EMERGENCY - Abnormal; Notable for the following components:    Opiate Scrn, Ur Presumptive Positive (*)     All other components within normal limits    Narrative:     Specimen Source->Urine   URINALYSIS MICROSCOPIC - Abnormal; Notable for the following components:    WBC, UA 14 (*)     WBC Clumps, UA Occasional (*)     Hyaline Casts, UA 3 (*)     All other components within normal limits    Narrative:     Specimen Source->Urine   CBC W/ AUTO DIFFERENTIAL - Abnormal; Notable for the following components:    WBC 20.45 (*)     RBC 3.91 (*)     Hemoglobin 8.5 (*)     Hematocrit 25.3 (*)     MCV 65 (*)     MCH 21.7 (*)     Platelets 489 (*)     Immature Granulocytes 4.7 (*)     Gran # (ANC) 17.0 (*)     Immature Grans (Abs) 0.96 (*)     nRBC 1 (*)     Gran % 83.0 (*)     Lymph % 7.0 (*)     All other components within normal limits   TROPONIN I - Abnormal; Notable for the following  components:    Troponin I 0.282 (*)     All other components within normal limits   HEMOGLOBIN A1C - Abnormal; Notable for the following components:    Hemoglobin A1C 7.1 (*)     Estimated Avg Glucose 157 (*)     All other components within normal limits   TROPONIN I - Abnormal; Notable for the following components:    Troponin I 0.191 (*)     All other components within normal limits   POCT GLUCOSE - Abnormal; Notable for the following components:    POCT Glucose 197 (*)     All other components within normal limits   POCT GLUCOSE - Abnormal; Notable for the following components:    POCT Glucose 202 (*)     All other components within normal limits   POCT GLUCOSE - Abnormal; Notable for the following components:    POCT Glucose 194 (*)     All other components within normal limits   CULTURE, URINE   LACTIC ACID, PLASMA   MAGNESIUM   HIV 1 / 2 ANTIBODY    Narrative:     Release to patient->Immediate   HEPATITIS PANEL, ACUTE    Narrative:     Release to patient->Immediate   TSH    Narrative:     Release to patient->Immediate        All Lab Results:  Results for orders placed or performed during the hospital encounter of 05/03/23   Blood culture x two cultures. Draw prior to antibiotics.    Specimen: Peripheral, Antecubital, Right; Blood   Result Value Ref Range    Blood Culture, Routine No Growth to date     Blood Culture, Routine No Growth to date    Blood culture x two cultures. Draw prior to antibiotics.    Specimen: Peripheral, Antecubital, Left; Blood   Result Value Ref Range    Blood Culture, Routine No Growth to date     Blood Culture, Routine No Growth to date    CBC auto differential   Result Value Ref Range    WBC 20.65 (H) 3.90 - 12.70 K/uL    RBC 4.30 4.00 - 5.40 M/uL    Hemoglobin 9.3 (L) 12.0 - 16.0 g/dL    Hematocrit 28.4 (L) 37.0 - 48.5 %    MCV 66 (L) 82 - 98 fL    MCH 21.6 (L) 27.0 - 31.0 pg    MCHC 32.7 32.0 - 36.0 g/dL    RDW 13.9 11.5 - 14.5 %    Platelets 499 (H) 150 - 450 K/uL    MPV 11.4 9.2 -  12.9 fL    Immature Granulocytes 2.7 (H) 0.0 - 0.5 %    Gran # (ANC) 17.7 (H) 1.8 - 7.7 K/uL    Immature Grans (Abs) 0.56 (H) 0.00 - 0.04 K/uL    Lymph # 1.4 1.0 - 4.8 K/uL    Mono # 0.8 0.3 - 1.0 K/uL    Eos # 0.1 0.0 - 0.5 K/uL    Baso # 0.10 0.00 - 0.20 K/uL    nRBC 1 (A) 0 /100 WBC    Gran % 85.5 (H) 38.0 - 73.0 %    Lymph % 6.8 (L) 18.0 - 48.0 %    Mono % 4.1 4.0 - 15.0 %    Eosinophil % 0.4 0.0 - 8.0 %    Basophil % 0.5 0.0 - 1.9 %    Differential Method Automated    Comprehensive metabolic panel   Result Value Ref Range    Sodium 144 136 - 145 mmol/L    Potassium 4.6 3.5 - 5.1 mmol/L    Chloride 103 95 - 110 mmol/L    CO2 21 (L) 23 - 29 mmol/L    Glucose 162 (H) 70 - 110 mg/dL    BUN 60 (H) 6 - 20 mg/dL    Creatinine 3.5 (H) 0.5 - 1.4 mg/dL    Calcium 9.5 8.7 - 10.5 mg/dL    Total Protein 8.6 (H) 6.0 - 8.4 g/dL    Albumin 2.3 (L) 3.5 - 5.2 g/dL    Total Bilirubin 1.3 (H) 0.1 - 1.0 mg/dL    Alkaline Phosphatase 172 (H) 55 - 135 U/L     (H) 10 - 40 U/L     (H) 10 - 44 U/L    Anion Gap 20 (H) 8 - 16 mmol/L    eGFR 16 (A) >60 mL/min/1.73 m^2   Urinalysis, Reflex to Urine Culture Urine, Catheterized    Specimen: Urine   Result Value Ref Range    Specimen UA Urine, Catheterized     Color, UA Yellow Yellow, Straw, Veronica    Appearance, UA Hazy (A) Clear    pH, UA 5.0 5.0 - 8.0    Specific Gravity, UA 1.020 1.005 - 1.030    Protein, UA 1+ (A) Negative    Glucose, UA Negative Negative    Ketones, UA Negative Negative    Bilirubin (UA) Negative Negative    Occult Blood UA 2+ (A) Negative    Nitrite, UA Negative Negative    Urobilinogen, UA 4.0-6.0 (A) <2.0 EU/dL    Leukocytes, UA Trace (A) Negative   Brain natriuretic peptide   Result Value Ref Range     (H) 0 - 99 pg/mL   Troponin I   Result Value Ref Range    Troponin I 0.259 (H) 0.000 - 0.026 ng/mL   Lactic acid, plasma   Result Value Ref Range    Lactate (Lactic Acid) 1.3 0.5 - 2.2 mmol/L   Procalcitonin   Result Value Ref Range    Procalcitonin  4.89 (H) <0.25 ng/mL   Drug screen panel, emergency   Result Value Ref Range    Benzodiazepines Negative Negative    Methadone metabolites Negative Negative    Cocaine (Metab.) Negative Negative    Opiate Scrn, Ur Presumptive Positive (A) Negative    Barbiturate Screen, Ur Negative Negative    Amphetamine Screen, Ur Negative Negative    THC Negative Negative    Phencyclidine Negative Negative    Creatinine, Urine 213.6 15.0 - 325.0 mg/dL    Toxicology Information SEE COMMENT    Urinalysis Microscopic   Result Value Ref Range    RBC, UA 1 0 - 4 /hpf    WBC, UA 14 (H) 0 - 5 /hpf    WBC Clumps, UA Occasional (A) None-Rare    Bacteria Rare None-Occ /hpf    Squam Epithel, UA 18 /hpf    Hyaline Casts, UA 3 (A) 0-1/lpf /lpf    Microscopic Comment SEE COMMENT    CBC Auto Differential   Result Value Ref Range    WBC 20.45 (H) 3.90 - 12.70 K/uL    RBC 3.91 (L) 4.00 - 5.40 M/uL    Hemoglobin 8.5 (L) 12.0 - 16.0 g/dL    Hematocrit 25.3 (L) 37.0 - 48.5 %    MCV 65 (L) 82 - 98 fL    MCH 21.7 (L) 27.0 - 31.0 pg    MCHC 33.6 32.0 - 36.0 g/dL    RDW 13.5 11.5 - 14.5 %    Platelets 489 (H) 150 - 450 K/uL    MPV 10.9 9.2 - 12.9 fL    Immature Granulocytes 4.7 (H) 0.0 - 0.5 %    Gran # (ANC) 17.0 (H) 1.8 - 7.7 K/uL    Immature Grans (Abs) 0.96 (H) 0.00 - 0.04 K/uL    Lymph # 1.4 1.0 - 4.8 K/uL    Mono # 0.9 0.3 - 1.0 K/uL    Eos # 0.1 0.0 - 0.5 K/uL    Baso # 0.09 0.00 - 0.20 K/uL    nRBC 1 (A) 0 /100 WBC    Gran % 83.0 (H) 38.0 - 73.0 %    Lymph % 7.0 (L) 18.0 - 48.0 %    Mono % 4.5 4.0 - 15.0 %    Eosinophil % 0.4 0.0 - 8.0 %    Basophil % 0.4 0.0 - 1.9 %    Differential Method Automated    Magnesium   Result Value Ref Range    Magnesium 2.6 1.6 - 2.6 mg/dL   Comprehensive Metabolic Panel   Result Value Ref Range    Sodium 144 136 - 145 mmol/L    Potassium 3.9 3.5 - 5.1 mmol/L    Chloride 106 95 - 110 mmol/L    CO2 21 (L) 23 - 29 mmol/L    Glucose 172 (H) 70 - 110 mg/dL    BUN 61 (H) 6 - 20 mg/dL    Creatinine 3.1 (H) 0.5 - 1.4 mg/dL     Calcium 8.4 (L) 8.7 - 10.5 mg/dL    Total Protein 7.7 6.0 - 8.4 g/dL    Albumin 2.1 (L) 3.5 - 5.2 g/dL    Total Bilirubin 1.3 (H) 0.1 - 1.0 mg/dL    Alkaline Phosphatase 155 (H) 55 - 135 U/L     (H) 10 - 40 U/L     (H) 10 - 44 U/L    Anion Gap 17 (H) 8 - 16 mmol/L    eGFR 18 (A) >60 mL/min/1.73 m^2   Troponin I   Result Value Ref Range    Troponin I 0.282 (H) 0.000 - 0.026 ng/mL   Lipid Panel   Result Value Ref Range    Cholesterol 142 120 - 199 mg/dL    Triglycerides 276 (H) 30 - 150 mg/dL    HDL 6 (L) 40 - 75 mg/dL    LDL Cholesterol 80.8 63.0 - 159.0 mg/dL    HDL/Cholesterol Ratio 4.2 (L) 20.0 - 50.0 %    Total Cholesterol/HDL Ratio 23.7 (H) 2.0 - 5.0    Non-HDL Cholesterol 136 mg/dL   Hemoglobin A1C   Result Value Ref Range    Hemoglobin A1C 7.1 (H) 4.0 - 5.6 %    Estimated Avg Glucose 157 (H) 68 - 131 mg/dL   HIV 1/2 Ag/Ab (4th Gen)   Result Value Ref Range    HIV 1/2 Ag/Ab Negative Negative   Hepatitis panel, acute   Result Value Ref Range    Hepatitis B Surface Ag Non-reactive Non-reactive    Hep B C IgM Non-reactive Non-reactive    Hep A IgM Non-reactive Non-reactive    Hepatitis C Ab Non-reactive Non-reactive   TSH   Result Value Ref Range    TSH 2.690 0.400 - 4.000 uIU/mL   Troponin I   Result Value Ref Range    Troponin I 0.191 (H) 0.000 - 0.026 ng/mL   Lactate dehydrogenase   Result Value Ref Range     (H) 110 - 260 U/L   Reticulocytes   Result Value Ref Range    Retic 0.7 0.5 - 2.5 %   Protein / creatinine ratio, urine   Result Value Ref Range    Protein, Urine Random 31 (H) 0 - 15 mg/dL    Creatinine, Urine 133.9 15.0 - 325.0 mg/dL    Prot/Creat Ratio, Urine 0.23 (H) 0.00 - 0.20   C4 complement   Result Value Ref Range    Complement (C-4) 12 11 - 44 mg/dL   C3 complement   Result Value Ref Range    Complement (C-3) 151 50 - 180 mg/dL   ASHA   Result Value Ref Range    ASHA Screen Negative <1:80 Negative <1:80   Navarrete's Stain, Urine Random   Result Value Ref Range    Navarrete's  Stain, Ur No eosinophils seen No eosinophils seen   Basic metabolic panel   Result Value Ref Range    Sodium 146 (H) 136 - 145 mmol/L    Potassium 3.8 3.5 - 5.1 mmol/L    Chloride 108 95 - 110 mmol/L    CO2 21 (L) 23 - 29 mmol/L    Glucose 176 (H) 70 - 110 mg/dL    BUN 65 (H) 6 - 20 mg/dL    Creatinine 2.7 (H) 0.5 - 1.4 mg/dL    Calcium 8.7 8.7 - 10.5 mg/dL    Anion Gap 17 (H) 8 - 16 mmol/L    eGFR 21 (A) >60 mL/min/1.73 m^2   CK   Result Value Ref Range    CPK 3423 (H) 20 - 180 U/L   Sodium, urine, random   Result Value Ref Range    Sodium, Urine 44 20 - 250 mmol/L   Creatinine, urine, random   Result Value Ref Range    Creatinine, Urine 133.9 15.0 - 325.0 mg/dL   Comprehensive metabolic panel   Result Value Ref Range    Sodium 149 (H) 136 - 145 mmol/L    Potassium 3.6 3.5 - 5.1 mmol/L    Chloride 113 (H) 95 - 110 mmol/L    CO2 22 (L) 23 - 29 mmol/L    Glucose 150 (H) 70 - 110 mg/dL    BUN 60 (H) 6 - 20 mg/dL    Creatinine 2.1 (H) 0.5 - 1.4 mg/dL    Calcium 8.9 8.7 - 10.5 mg/dL    Total Protein 7.4 6.0 - 8.4 g/dL    Albumin 2.1 (L) 3.5 - 5.2 g/dL    Total Bilirubin 1.0 0.1 - 1.0 mg/dL    Alkaline Phosphatase 141 (H) 55 - 135 U/L     (H) 10 - 40 U/L     (H) 10 - 44 U/L    Anion Gap 14 8 - 16 mmol/L    eGFR 29 (A) >60 mL/min/1.73 m^2   Magnesium   Result Value Ref Range    Magnesium 2.6 1.6 - 2.6 mg/dL   Phosphorus   Result Value Ref Range    Phosphorus 3.3 2.7 - 4.5 mg/dL   CBC auto differential   Result Value Ref Range    WBC 15.21 (H) 3.90 - 12.70 K/uL    RBC 3.60 (L) 4.00 - 5.40 M/uL    Hemoglobin 7.8 (L) 12.0 - 16.0 g/dL    Hematocrit 23.9 (L) 37.0 - 48.5 %    MCV 66 (L) 82 - 98 fL    MCH 21.7 (L) 27.0 - 31.0 pg    MCHC 32.6 32.0 - 36.0 g/dL    RDW 13.5 11.5 - 14.5 %    Platelets 520 (H) 150 - 450 K/uL    MPV 10.8 9.2 - 12.9 fL    Immature Granulocytes 3.8 (H) 0.0 - 0.5 %    Gran # (ANC) 12.1 (H) 1.8 - 7.7 K/uL    Immature Grans (Abs) 0.58 (H) 0.00 - 0.04 K/uL    Lymph # 1.5 1.0 - 4.8 K/uL    Mono  # 0.9 0.3 - 1.0 K/uL    Eos # 0.2 0.0 - 0.5 K/uL    Baso # 0.09 0.00 - 0.20 K/uL    nRBC 2 (A) 0 /100 WBC    Gran % 79.3 (H) 38.0 - 73.0 %    Lymph % 9.5 (L) 18.0 - 48.0 %    Mono % 5.7 4.0 - 15.0 %    Eosinophil % 1.1 0.0 - 8.0 %    Basophil % 0.6 0.0 - 1.9 %    Differential Method Automated    APTT   Result Value Ref Range    aPTT 27.0 21.0 - 32.0 sec   Protime-INR   Result Value Ref Range    Prothrombin Time 13.0 (H) 9.0 - 12.5 sec    INR 1.2 0.8 - 1.2   Echo   Result Value Ref Range    BSA 2.39 m2    TDI SEPTAL 0.07 m/s    LV LATERAL E/E' RATIO 9.80 m/s    LV SEPTAL E/E' RATIO 14.00 m/s    LA WIDTH 3.80 cm    IVC diameter 1.40 cm    Left Ventricular Outflow Tract Mean Velocity 1.39 cm/s    Left Ventricular Outflow Tract Mean Gradient 7.71 mmHg    TDI LATERAL 0.10 m/s    LVIDd 4.67 3.5 - 6.0 cm    IVS 1.66 (A) 0.6 - 1.1 cm    Posterior Wall 1.71 (A) 0.6 - 1.1 cm    Ao root annulus 2.97 cm    LVIDs 3.01 2.1 - 4.0 cm    FS 36 28 - 44 %    LA volume 59.78 cm3    STJ 3.17 cm    Ascending aorta 3.06 cm    LV mass 348.27 g    LA size 3.75 cm    TAPSE 2.00 cm    Left Ventricle Relative Wall Thickness 0.73 cm    AV mean gradient 11 mmHg    AV valve area 2.66 cm2    AV Velocity Ratio 0.79     AV index (prosthetic) 0.84     MV valve area p 1/2 method 3.10 cm2    E/A ratio 1.04     Mean e' 0.09 m/s    E wave deceleration time 245.08 msec    IVRT 79.92 msec    LVOT diameter 2.01 cm    LVOT area 3.2 cm2    LVOT peak regis 1.49 m/s    LVOT peak VTI 25.90 cm    Ao peak regis 1.89 m/s    Ao VTI 30.9 cm    RVOT peak regis 1.26 m/s    RVOT peak VTI 23.8 cm    LVOT stroke volume 82.14 cm3    AV peak gradient 14 mmHg    PV mean gradient 4.54 mmHg    E/E' ratio 11.53 m/s    MV Peak E Regis 0.98 m/s    TR Max Regis 2.44 m/s    MV stenosis pressure 1/2 time 71.07 ms    MV Peak A Regis 0.94 m/s    LV Systolic Volume 35.32 mL    LV Systolic Volume Index 15.7 mL/m2    LV Diastolic Volume 101.01 mL    LV Diastolic Volume Index 44.89 mL/m2    LA Volume  Index 26.6 mL/m2    LV Mass Index 155 g/m2    RA Major Axis 3.88 cm    Left Atrium Minor Axis 5.09 cm    Left Atrium Major Axis 4.79 cm    Triscuspid Valve Regurgitation Peak Gradient 24 mmHg    RA Width 2.50 cm    Right Atrial Pressure (from IVC) 3 mmHg    EF 65 %    TV rest pulmonary artery pressure 27 mmHg   POCT glucose   Result Value Ref Range    POCT Glucose 197 (H) 70 - 110 mg/dL   POCT glucose   Result Value Ref Range    POCT Glucose 202 (H) 70 - 110 mg/dL   POCT glucose   Result Value Ref Range    POCT Glucose 194 (H) 70 - 110 mg/dL   POCT glucose   Result Value Ref Range    POCT Glucose 170 (H) 70 - 110 mg/dL   POCT glucose   Result Value Ref Range    POCT Glucose 167 (H) 70 - 110 mg/dL   POCT glucose   Result Value Ref Range    POCT Glucose 158 (H) 70 - 110 mg/dL   POCT glucose   Result Value Ref Range    POCT Glucose 184 (H) 70 - 110 mg/dL         Imaging Results:  Imaging Results              US Retroperitoneal Complete (Final result)  Result time 05/04/23 15:45:42      Final result by Chente Guzman MD (05/04/23 15:45:42)                   Impression:      No significant abnormality.      Electronically signed by: Chente Guzman  Date:    05/04/2023  Time:    15:45               Narrative:    EXAMINATION:  US RETROPERITONEAL COMPLETE    CLINICAL HISTORY:  LUISA;    TECHNIQUE:  Ultrasound of the kidneys and urinary bladder was performed including color flow and Doppler evaluation of the kidneys.    COMPARISON:  Abdominal ultrasound May 4, 2023.    FINDINGS:  Right kidney: The right kidney measures 13.7 cm. No cortical thinning.  Resistive index measures 0.69.  No mass. No renal stone. No hydronephrosis.    Left kidney: The left kidney measures 11.2 cm. No cortical thinning. Resistive index measures 0.7.  No mass. No renal stone. No hydronephrosis.    The bladder is partially distended at the time of scanning and has an unremarkable appearance.                                       MRI Brain Without  Contrast (Final result)  Result time 05/04/23 14:18:14      Final result by Delbert Cai MD (05/04/23 14:18:14)                   Impression:      Bilateral small scattered acute infarcts favoring embolic type infarcts.    COMMUNICATION  This critical result was discovered/received at 210 p.m..  The critical information above was relayed directly by me by telephone to Shabana cash LPN on 05/04/2023 at 14:17.      Electronically signed by: Delbert Cai  Date:    05/04/2023  Time:    14:18               Narrative:    EXAMINATION:  MRI BRAIN WITHOUT CONTRAST    CLINICAL HISTORY:  Mental status change, unknown cause;.    TECHNIQUE:  Multiplanar multisequence MR imaging of the brain was performed without contrast.    COMPARISON:  Multiple priors.    FINDINGS:  Intracranial compartment:    Motion severely degrades the exam.    Ventricles and sulci are normal in size for age without evidence of hydrocephalus. No extra-axial blood or fluid collections.    Multiple small scattered acute infarcts some cortical and others within the white matter.  These favor embolic type infarcts.  Infarcts are noted in the bilateral frontal, and parietal lobes and basal ganglia.  Suspected bilateral occipital infarcts.  No mass lesion, acute hemorrhage, edema or acute infarct.    Normal vascular flow voids are preserved.    Skull/extracranial contents (limited evaluation): Bone marrow signal intensity is normal.                                       MRI Thoracic Spine Without Contrast (No Result on File)  Result time 05/04/23 13:32:35   Procedure changed from MRI Thoracic Spine W WO Cont                    US Abdomen Limited (Final result)  Result time 05/04/23 09:17:50      Final result by Luis Locke MD (05/04/23 09:17:50)                   Impression:      Technically difficult examination.    Enlarged liver with probable fatty infiltration.    Cholelithiasis without evidence of acute cholecystitis.    Additional findings as  above.      Electronically signed by: Luis Locke  Date:    05/04/2023  Time:    09:17               Narrative:    EXAMINATION:  US ABDOMEN LIMITED    CLINICAL HISTORY:  elevated LFTs;.    TECHNIQUE:  Limited ultrasound of the right upper quadrant of the abdomen including pancreas, liver, gallbladder, common bile duct was performed.  Technically difficult examination secondary to patient body positioning and habitus.  Prominent overlying bowel gas.    COMPARISON:  None.    FINDINGS:  Liver: Normal in size, measuring 21.1 cm. Probable fatty liver infiltration. No focal hepatic lesions.  Portal vein patent and demonstrates proper directional flow.    Gallbladder: Gallbladder filled with gallstones creating wall echo shadow sign.  There is no gallbladder wall thickening or pericholecystic fluid.  No reported positive sonographic Collins's sign.    Biliary system: The common duct is not dilated, measuring 4 mm.  No intrahepatic ductal dilatation.    Spleen: Upper limits normal in size with a homogeneous echotexture, measuring 11.1 cm.    Pancreas: The visualized portions of pancreas appear normal.    Miscellaneous: No ascites.                                       US Carotid Bilateral (Final result)  Result time 05/04/23 08:51:37      Final result by Tonya No MD (05/04/23 08:51:37)                   Impression:      No evidence of a hemodynamically significant carotid bifurcation stenosis.      Electronically signed by: Tonya No  Date:    05/04/2023  Time:    08:51               Narrative:    EXAMINATION:  US CAROTID BILATERAL    CLINICAL HISTORY:  AMS;    TECHNIQUE:  Grayscale and color Doppler ultrasound examination of the carotid and vertebral artery systems bilaterally.  Stenosis estimates are per the NASCET measurement criteria.    COMPARISON:  None.    FINDINGS:  Right:    Internal Carotid Artery (ICA) peak systolic velocity 106 cm/sec    ICA/CCA peak systolic velocity ratio: 1.3    Plaque  formation: No significant    Vertebral artery: Antegrade flow and normal waveform.    Left:    Internal Carotid Artery (ICA)  peak systolic velocity 94 cm/sec    ICA/CCA peak systolic velocity ratio: 1.1    Plaque formation: Mild homogeneous    Vertebral artery: Antegrade flow and normal waveform.                                       CT Head Without Contrast (Final result)  Result time 05/03/23 22:50:33      Final result by Tammie Peña MD (05/03/23 22:50:33)                   Impression:      No acute finding as visualized with image degradation      Electronically signed by: Tammie Peña  Date:    05/03/2023  Time:    22:50               Narrative:    EXAMINATION:  CT HEAD WITHOUT CONTRAST    CLINICAL HISTORY:  Mental status change, unknown cause;    TECHNIQUE:  Low dose axial images were obtained through the head.  Coronal and sagittal reformations were also performed. Contrast was not administered.    COMPARISON:  None.    FINDINGS:  Iterative technique for diminishing radiation exposure automated dose    No acute intracranial hemorrhage or mass effect as visualized motion degradation.  No hydrocephalus.  Mild chronic changes.  Right scalp swelling or contusion.  No displaced skull fracture                                       X-Ray Chest AP Portable (Final result)  Result time 05/03/23 22:41:52      Final result by Tammie Peña MD (05/03/23 22:41:52)                   Impression:      No active finding      Electronically signed by: Tammie Peña  Date:    05/03/2023  Time:    22:41               Narrative:    EXAMINATION:  XR CHEST AP PORTABLE    CLINICAL HISTORY:  Sepsis;    TECHNIQUE:  Single frontal portable view of the chest was performed.    COMPARISON:  None    FINDINGS:  No pulmonary consolidation or sizable pleural effusion                                       The EKG was ordered, reviewed, and independently interpreted by the ED provider.  Interpretation time: 23:08  Rate: 97  BPM  Rhythm: normal sinus rhythm  Interpretation: Left ventricular hypertrophy with repolarization abnormality ([place] product). No STEMI.           The Emergency Provider reviewed the vital signs and test results, which are outlined above.     ED Discussion     12:39 AM: Discussed case with Ivonne Ludwig NP ([facility]). Dr. Denis agrees with current care and management of pt and accepts admission.   Admitting Service: [facility]  Admitting Physician: Dr. Denis  Admit to: Obs med/tele    12:39 AM: Re-evaluated pt. I have discussed test results, shared treatment plan, and the need for admission with patient and family at bedside. Pt and family express understanding at this time and agree with all information. All questions answered. Pt and family have no further questions or concerns at this time. Pt is ready for admit.         Medical Decision Making:   Clinical Tests:   Lab Tests: Ordered and Reviewed  Radiological Study: Ordered and Reviewed  Medical Tests: Ordered and Reviewed         ED Medication(s):  Medications   acetaminophen tablet 650 mg (has no administration in time range)   ondansetron injection 4 mg (has no administration in time range)   0.9%  NaCl infusion ( Intravenous Stopped 5/5/23 5959)   piperacillin-tazobactam (ZOSYN) 4.5 g in dextrose 5 % in water (D5W) 5 % 100 mL IVPB (MB+) (4.5 g Intravenous New Bag 5/5/23 1415)   clindamycin in D5W 600 mg/50 mL IVPB 600 mg (0 mg Intravenous Stopped 5/5/23 1026)   dextrose 10% bolus 125 mL 125 mL (has no administration in time range)   dextrose 10% bolus 250 mL 250 mL (has no administration in time range)   sodium chloride 0.9% flush 10 mL (has no administration in time range)   heparin (porcine) injection 5,000 Units (5,000 Units Subcutaneous Given 5/5/23 5104)   hydrALAZINE injection 10 mg (10 mg Intravenous Given 5/4/23 6778)   sodium chloride 0.9% bolus 1,000 mL 1,000 mL (0 mLs Intravenous Stopped 5/4/23 0029)   hydrALAZINE injection  10 mg (10 mg Intravenous Given 5/3/23 5109)   piperacillin-tazobactam (ZOSYN) 4.5 g in dextrose 5 % in water (D5W) 5 % 100 mL IVPB (MB+) (0 g Intravenous Stopped 5/4/23 0022)   sodium chloride 0.9% bolus 1,641 mL 1,641 mL (0 mLs Intravenous Stopped 5/4/23 0503)       There are no discharge medications for this patient.              Scribe Attestation:   Scribe #1: I performed the above scribed service and the documentation accurately describes the services I performed. I attest to the accuracy of the note.     Attending:   Physician Attestation Statement for Scribe #1: I, Romy Garcia MD, personally performed the services described in this documentation, as scribed by Nickie Davila, in my presence, and it is both accurate and complete.           Clinical Impression       ICD-10-CM ICD-9-CM   1. Sepsis, due to unspecified organism, unspecified whether acute organ dysfunction present  A41.9 038.9     995.91   2. Altered mental status  R41.82 780.97   3. NSTEMI (non-ST elevated myocardial infarction)  I21.4 410.70   4. LUISA (acute kidney injury)  N17.9 584.9   5. Transaminitis  R74.01 790.4   6. Elevated troponin  R77.8 790.6   7. Severe sepsis  A41.9 038.9    R65.20 995.92       Disposition:   Disposition: Placed in Observation  Condition: Fair       Romy Garcia MD  05/05/23 2309

## 2023-05-04 NOTE — PT/OT/SLP EVAL
Occupational Therapy Evaluation and Treatment    Name: Kenia Roper  MRN: [id number]  Admitting Diagnosis: Acute metabolic encephalopathy  Recent Surgery: * No surgery found *      Recommendations:     Discharge Recommendations: nursing facility, skilled  Level of Assistance Recommended: 24 hours significant assistance  Discharge Equipment Recommendations: to be determined by next level of care  Barriers to discharge:      Assessment:     Kenia Roper is a 47 y.o. female with a medical diagnosis of Acute metabolic encephalopathy. She presents with performance deficits affecting function including weakness, impaired functional mobility, impaired cognition, decreased safety awareness, impaired endurance, gait instability, decreased coordination, impaired balance, decreased upper extremity function, impaired self care skills, decreased lower extremity function.     Rehab Prognosis: Fair; patient would benefit from acute OT services to address these deficits and reach maximum level of function.    Plan:     Patient to be seen 2 x/week to address the above listed problems via self-care/home management, therapeutic activities, therapeutic exercises  Plan of Care Expires: 05/18/23  Plan of Care Reviewed with: patient    Subjective     Chief Complaint: debility and generalized weakness  Patient Comments/Goals: unable to respond   Pain/Comfort:  Pain Rating 1:  (UNABLE TO VERBALIZE PAINLEVEL ON BACK HOWEVER PT REPORTED PAINFUL)    Patients cultural, spiritual, Holiness conflicts given the current situation:      Social History: poor historian  Living Environment: Patient  poor historian . Chart reported lived with daughter pt reported live with mother  in a single story home  Prior Level of Function: Prior to admission, patient unknown  Roles and Routines: Patient was  unknown  prior to admission.  Equipment Used at Home:  (UNKNOWN)  DME owned (not currently used):  unknown  Assistance Upon Discharge: facility  staff    Objective:     Communicated with nurse and epic chart review prior to session. Patient found HOB elevated with telemetry, pulse ox (continuous), blood pressure cuff, PureWick upon OT entry to room.    General Precautions: Standard, fall   Orthopedic Precautions: N/A   Braces: N/A    Respiratory Status: Room air    Occupational Performance    Gait belt applied - N/A    Bed Mobility:   Rolling/Turning to Left with maximal assistance and of 2 persons  Rolling/Turning to Right with maximal assistance and of 2 persons    Activities of Daily Living:  Upper Body Dressing: maximal assistance  Lower Body Dressing: total assistance    Cognitive/Visual Perceptual:  Cognitive/Psychosocial Skills:    -     Oriented to: Person  -     Follows Commands/attention: Easily distracted  -     Communication: expressive aphasia, receptive aphasia, and slurred speech  -     Memory: Impaired STM, Impaired LTM, and Poor immediate recall  -     Safety awareness/insight to disability: impaired    Physical Exam:  Upper Extremity Range of Motion:     -       Right Upper Extremity: aarom wfl  -       Left Upper Extremity: aarom wfl  Upper Extremity Strength:    -       Right Upper Extremity: mmt: 1/5 grossly  -       Left Upper Extremity: mmt: 1/5 grossly    AMPAC 6 Click ADL:  AMPAC Total Score: 6    Treatment & Education:  Therapist provided facilitation and instruction of proper body mechanics, energy conservation, and fall prevention strategies during tasks listed above  Patient educated on role of OT, POC, and goals for therapy      Patient not clear to transfer with RN/PCT.    Patient left HOB elevated with all lines intact, call button in reach, and RN notified.    GOALS:   Multidisciplinary Problems       Occupational Therapy Goals          Problem: Occupational Therapy    Goal Priority Disciplines Outcome Interventions   Occupational Therapy Goal     OT, PT/OT     Description: O.T. GOALS TO BE MET BY 5-18-23  PT WILL TOLERATE 1  SET X 10 REPS B UE AA/AROM EXERCISE  MOD A WITH UE DRESSING  MOD A WITH ROLLING L<>R   MOD A WITH SUPINE<>SIT TRANSFERS                               History:     Past Medical History:   Diagnosis Date    Diabetes mellitus     Ovarian cancer     Stroke        History reviewed. No pertinent surgical history.    Time Tracking:     OT Date of Treatment: 05/04/23  OT Start Time: 1226  OT Stop Time: 1250  OT Total Time (min): 24 min    Billable Minutes: Evaluation 14 minutes and Therapeutic Activity 10 minutes    5/4/2023

## 2023-05-04 NOTE — CONSULTS
[facility] - Tele[facility] ([facility])  General Surgery  Consult Note    Patient Name: Kenia Roper  MRN: [id number]  Code Status: Full Code  Admission Date: 5/3/2023  Hospital Length of Stay: 0 days  Attending Physician: Chelsy Cole MD  Primary Care Provider: Ross Perdomo MD    Patient information was obtained from past medical records, ER records and primary team.     Inpatient consult to General Surgery  Consult performed by: Michael Gibson MD  Consult ordered by: Chelsy Cole MD        Subjective:     Principal Problem: Acute metabolic encephalopathy/back abscess    History of Present Illness: 56yo F who is admitted to the [facility] medicine service for evaluation of altered mental status, CVA and being found unresponsive at home.  Patient was reportedly found down at home covered in feces and urine with her last known well check around 2 days before this.  Has a known history of CVA and type 2 DM.  Was found to be septic upon presentation with a leukocytosis of 20k and elevated creatinine at 3.5.  She is admitted to the [facility] medical service for evaluation of altered mental status and neurologic changes along with workup of her sepsis.  She was found to have a large middle back abscess in her upper back which was actively draining purulence upon admission.  Wound care was consulted and thought she may need surgical debridement.  General surgery was consulted for evaluation.  She is a pending MRI T-spine.  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      No current facility-administered medications on file prior to encounter.     No current outpatient medications on file prior to encounter.       Review of patient's allergies indicates:  No Known Allergies    Past Medical History:   Diagnosis Date    Diabetes mellitus     Ovarian cancer     Stroke      History reviewed. No pertinent surgical history.  Family History    None       Tobacco Use    Smoking status:  Unknown    Smokeless tobacco: Not on file   Substance and Sexual Activity    Alcohol use: Not on file    Drug use: Not on file    Sexual activity: Not on file     Review of Systems   Unable to perform ROS: Mental status change   Objective:     Vital Signs (Most Recent):  Temp: 99.1 °F (37.3 °C) (05/04/23 1356)  Pulse: 93 (05/04/23 1356)  Resp: (!) 28 (05/04/23 1356)  BP: (!) 163/81 (05/04/23 1356)  SpO2: 95 % (05/04/23 1356)   Vital Signs (24h Range):  Temp:  [98.9 °F (37.2 °C)-99.4 °F (37.4 °C)] 99.1 °F (37.3 °C)  Pulse:  [] 93  Resp:  [19-33] 28  SpO2:  [94 %-99 %] 95 %  BP: (148-236)/() 163/81     Weight: 126.1 kg (278 lb)  Body mass index is 47.72 kg/m².     Physical Exam  Constitutional:       Appearance: She is well-developed. She is obese.   HENT:      Head: Normocephalic and atraumatic.   Eyes:      Conjunctiva/sclera: Conjunctivae normal.   Neck:      Thyroid: No thyromegaly.   Cardiovascular:      Rate and Rhythm: Normal rate.   Pulmonary:      Effort: Pulmonary effort is normal. No respiratory distress.   Abdominal:      General: There is no distension.      Palpations: Abdomen is soft. There is no mass.      Tenderness: There is no abdominal tenderness.   Skin:     Comments: Midline T-spine with actively purulent draining area of fluctuance and induration consistent with abscess. Probed with cotton tipped applicator and abscess cavity is at least 5cm deep and measures around 8cm x 9cm.    Neurological:      Mental Status: She is alert.          I have reviewed all pertinent lab results within the past 24 hours.  CBC:   Recent Labs   Lab 05/04/23 0427   WBC 20.45*   RBC 3.91*   HGB 8.5*   HCT 25.3*   *   MCV 65*   MCH 21.7*   MCHC 33.6     BMP:   Recent Labs   Lab 05/04/23 0427   *      K 3.9      CO2 21*   BUN 61*   CREATININE 3.1*   CALCIUM 8.4*   MG 2.6     CMP:   Recent Labs   Lab 05/04/23  0427   *   CALCIUM 8.4*   ALBUMIN 2.1*   PROT 7.7       K 3.9   CO2 21*      BUN 61*   CREATININE 3.1*   ALKPHOS 155*   *   *   BILITOT 1.3*     LFTs:   Recent Labs   Lab 05/04/23  0427   *   *   ALKPHOS 155*   BILITOT 1.3*   PROT 7.7   ALBUMIN 2.1*       Significant Diagnostics:  I have reviewed all pertinent imaging results/findings within the past 24 hours.      MRI Brain:  FINDINGS:  Intracranial compartment:     Motion severely degrades the exam.     Ventricles and sulci are normal in size for age without evidence of hydrocephalus. No extra-axial blood or fluid collections.     Multiple small scattered acute infarcts some cortical and others within the white matter.  These favor embolic type infarcts.  Infarcts are noted in the bilateral frontal, and parietal lobes and basal ganglia.  Suspected bilateral occipital infarcts.  No mass lesion, acute hemorrhage, edema or acute infarct.     Normal vascular flow voids are preserved.     Skull/extracranial contents (limited evaluation): Bone marrow signal intensity is normal.     Impression:     Bilateral small scattered acute infarcts favoring embolic type infarcts.    Assessment/Plan:     * Acute metabolic encephalopathy  Care per primary team    Back abscess  46yo F with large abscess in upper back/T-spine area    - No emergent surgical intervention at this time  - patient will likely need this abscess drained surgically however this needs to be establish whether this is communicating with the thoracic spine or anything near the spinal column.  She may warrant a neurosurgery/spine consult to manage this.  - recommend MRI T-spine for evaluation  - agree with IV antibiotics  - rest of care per primary team    High anion gap metabolic acidosis  Care per primary team    Type 2 diabetes mellitus with hyperglycemia  Care per primary team    Elevated troponin  Care per primary team    LUISA (acute kidney injury)  Care per primary team/nephrology    Severe sepsis  Care per primary  team    Morbid obesity with BMI of 45.0-49.9, adult  Care per primary team      VTE Risk Mitigation (From admission, onward)         Ordered     heparin (porcine) injection 5,000 Units  Every 12 hours         05/04/23 0456     IP VTE HIGH RISK PATIENT  Once         05/04/23 0456     Place sequential compression device  Until discontinued         05/04/23 0456     Place sequential compression device  Until discontinued         05/04/23 0043                Thank you for your consult. I will follow-up with patient. Please contact us if you have any additional questions.    Michael Gibson MD  General Surgery  [facility] - Telemetry ([facility])

## 2023-05-04 NOTE — PLAN OF CARE
SEE EVAL FOR DETAILS. PT DISPLAYED DEFICITS WITH ADL'S SKILLS, DECREASE FUNCTIONAL MOBILITY/ TRANSFERS AS WELL AS DECREASE B UE STRENGTH/ENDURANCE. RECOMMENDATION: SNF

## 2023-05-04 NOTE — SUBJECTIVE & OBJECTIVE
No current facility-administered medications on file prior to encounter.     No current outpatient medications on file prior to encounter.       Review of patient's allergies indicates:  No Known Allergies    Past Medical History:   Diagnosis Date    Diabetes mellitus     Ovarian cancer     Stroke      History reviewed. No pertinent surgical history.  Family History    None       Tobacco Use    Smoking status: Unknown    Smokeless tobacco: Not on file   Substance and Sexual Activity    Alcohol use: Not on file    Drug use: Not on file    Sexual activity: Not on file     Review of Systems   Unable to perform ROS: Mental status change   Objective:     Vital Signs (Most Recent):  Temp: 99.1 °F (37.3 °C) (05/04/23 1356)  Pulse: 93 (05/04/23 1356)  Resp: (!) 28 (05/04/23 1356)  BP: (!) 163/81 (05/04/23 1356)  SpO2: 95 % (05/04/23 1356)   Vital Signs (24h Range):  Temp:  [98.9 °F (37.2 °C)-99.4 °F (37.4 °C)] 99.1 °F (37.3 °C)  Pulse:  [] 93  Resp:  [19-33] 28  SpO2:  [94 %-99 %] 95 %  BP: (148-236)/() 163/81     Weight: 126.1 kg (278 lb)  Body mass index is 47.72 kg/m².     Physical Exam  Constitutional:       Appearance: She is well-developed. She is obese.   HENT:      Head: Normocephalic and atraumatic.   Eyes:      Conjunctiva/sclera: Conjunctivae normal.   Neck:      Thyroid: No thyromegaly.   Cardiovascular:      Rate and Rhythm: Normal rate.   Pulmonary:      Effort: Pulmonary effort is normal. No respiratory distress.   Abdominal:      General: There is no distension.      Palpations: Abdomen is soft. There is no mass.      Tenderness: There is no abdominal tenderness.   Skin:     Comments: Midline T-spine with actively purulent draining area of fluctuance and induration consistent with abscess. Probed with cotton tipped applicator and abscess cavity is at least 5cm deep and measures around 8cm x 9cm.    Neurological:      Mental Status: She is alert.          I have reviewed all pertinent lab results  within the past 24 hours.  CBC:   Recent Labs   Lab 05/04/23 0427   WBC 20.45*   RBC 3.91*   HGB 8.5*   HCT 25.3*   *   MCV 65*   MCH 21.7*   MCHC 33.6     BMP:   Recent Labs   Lab 05/04/23 0427   *      K 3.9      CO2 21*   BUN 61*   CREATININE 3.1*   CALCIUM 8.4*   MG 2.6     CMP:   Recent Labs   Lab 05/04/23 0427   *   CALCIUM 8.4*   ALBUMIN 2.1*   PROT 7.7      K 3.9   CO2 21*      BUN 61*   CREATININE 3.1*   ALKPHOS 155*   *   *   BILITOT 1.3*     LFTs:   Recent Labs   Lab 05/04/23 0427   *   *   ALKPHOS 155*   BILITOT 1.3*   PROT 7.7   ALBUMIN 2.1*       Significant Diagnostics:  I have reviewed all pertinent imaging results/findings within the past 24 hours.

## 2023-05-04 NOTE — SUBJECTIVE & OBJECTIVE
Past Medical History:   Diagnosis Date    Diabetes mellitus     Ovarian cancer     Stroke        No past surgical history on file.    Review of patient's allergies indicates:  No Known Allergies    No current facility-administered medications on file prior to encounter.     No current outpatient medications on file prior to encounter.     Family History    None       Tobacco Use    Smoking status: Not on file    Smokeless tobacco: Not on file   Substance and Sexual Activity    Alcohol use: Not on file    Drug use: Not on file    Sexual activity: Not on file     Review of Systems   Unable to perform ROS: Mental status change   Objective:     Vital Signs (Most Recent):  Temp: 98.9 °F (37.2 °C) (05/03/23 2149)  Pulse: 89 (05/03/23 2321)  Resp: (!) 33 (05/03/23 2316)  BP: (!) 148/99 (05/03/23 2316)  SpO2: 95 % (05/03/23 2316)   Vital Signs (24h Range):  Temp:  [98.9 °F (37.2 °C)] 98.9 °F (37.2 °C)  Pulse:  [] 89  Resp:  [19-33] 33  SpO2:  [94 %-95 %] 95 %  BP: (148-236)/() 148/99        There is no height or weight on file to calculate BMI.    Physical Exam  Vitals and nursing note reviewed.   Constitutional:       General: She is not in acute distress.     Appearance: She is morbidly obese. She is ill-appearing.   HENT:      Head: Normocephalic and atraumatic.      Mouth/Throat:      Mouth: Mucous membranes are dry.   Eyes:      General: No scleral icterus.     Conjunctiva/sclera: Conjunctivae normal.   Cardiovascular:      Rate and Rhythm: Regular rhythm. Tachycardia present.      Heart sounds: No murmur heard.  Pulmonary:      Effort: Pulmonary effort is normal. No respiratory distress.      Breath sounds: Normal breath sounds. No wheezing.   Abdominal:      General: There is distension.      Palpations: Abdomen is soft.      Comments: Obese abdomen   Musculoskeletal:         General: No swelling.      Cervical back: No rigidity.   Skin:     General: Skin is warm.      Coloration: Skin is not jaundiced.    Neurological:      Comments: Patient is currently aphasic.  Eyes open, but does not follow commands.  Responds to painful stimuli.           Significant Labs: All pertinent labs within the past 24 hours have been reviewed.  BMP:   Recent Labs   Lab 05/03/23 2301   *      K 4.6      CO2 21*   BUN 60*   CREATININE 3.5*   CALCIUM 9.5     CBC:   Recent Labs   Lab 05/03/23 2301   WBC 20.65*   HGB 9.3*   HCT 28.4*   *     CMP:   Recent Labs   Lab 05/03/23 2301      K 4.6      CO2 21*   *   BUN 60*   CREATININE 3.5*   CALCIUM 9.5   PROT 8.6*   ALBUMIN 2.3*   BILITOT 1.3*   ALKPHOS 172*   *   *   ANIONGAP 20*     Troponin:   Recent Labs   Lab 05/03/23 2301   TROPONINI 0.259*       Significant Imaging: I have reviewed all pertinent imaging results/findings within the past 24 hours.  I have reviewed and interpreted all pertinent imaging results/findings within the past 24 hours.    Imaging Results              CT Head Without Contrast (Final result)  Result time 05/03/23 22:50:33      Final result by Tammie Peña MD (05/03/23 22:50:33)                   Impression:      No acute finding as visualized with image degradation      Electronically signed by: Tammie Peña  Date:    05/03/2023  Time:    22:50               Narrative:    EXAMINATION:  CT HEAD WITHOUT CONTRAST    CLINICAL HISTORY:  Mental status change, unknown cause;    TECHNIQUE:  Low dose axial images were obtained through the head.  Coronal and sagittal reformations were also performed. Contrast was not administered.    COMPARISON:  None.    FINDINGS:  Iterative technique for diminishing radiation exposure automated dose    No acute intracranial hemorrhage or mass effect as visualized motion degradation.  No hydrocephalus.  Mild chronic changes.  Right scalp swelling or contusion.  No displaced skull fracture                                       X-Ray Chest AP Portable (Final result)   Result time 05/03/23 22:41:52      Final result by Tammie Peña MD (05/03/23 22:41:52)                   Impression:      No active finding      Electronically signed by: Tammie Peña  Date:    05/03/2023  Time:    22:41               Narrative:    EXAMINATION:  XR CHEST AP PORTABLE    CLINICAL HISTORY:  Sepsis;    TECHNIQUE:  Single frontal portable view of the chest was performed.    COMPARISON:  None    FINDINGS:  No pulmonary consolidation or sizable pleural effusion                                    I have independently reviewed and interpreted the EKG.     I have independently reviewed all pertinent labs within the past 24 hours.    I have independently reviewed, visualized and interpreted all pertinent imaging results within the past 24 hours and discussed the findings with the ED physician, Dr. Garcia

## 2023-05-04 NOTE — H&P
[facility] - Emergency Dept.  [facility]  History & Physical    Patient Name: Kenia Roper  MRN: [id number]  Patient Class: OP- Observation  Admission Date: 5/3/2023  Attending Physician: Luis Denis MD   Primary Care Provider: Ross Perdomo MD         Patient information was obtained from past medical records and ER records.     Subjective:     Principal Problem:Acute metabolic encephalopathy    Chief Complaint:   Chief Complaint   Patient presents with    Altered Mental Status     Found at home with GCS 11, covered in feces and vomit. Family has not seen or heard from her in 2 days. Hx previous CVA        HPI: Ms. Roper is a 57-year-old morbidly obese  female, was brought in to the ED in an unresponsive state.  No family at the bedside, unable to obtain any information from patient.  No information per Care everywhere.  Most of the information obtained from ED staff.  Per report, patient was found down at home, covered in feces and urine.  Last known well two days ago.  No report of fever or chills.  Known history of CVA and type 2 diabetes mellitus.  In the ED she is afebrile, mildly tachycardic HR .  WBC elevated 20,000. BUN 60, creatinine 3.5 (unknown baseline).  Elevated LFTs in the 400s.  Troponin elevated 0.259, unknown if she has chest pain or SOB.  EKG NSR with no ST T wave changes.  Hemodynamically stable.  Presumed severe sepsis with LUISA/AMS, she received 30 mL/kilogram bolus in the ED along with IV Zosyn empirically.  Cultures obtained.  UDS positive for opiates.  CT head is negative for acute intracranial pathology.  CXR negative for infectious process.    Admitting diagnosis:  Severe sepsis.  Acute encephalopathy (TIA versus uremia).  LUISA.  Elevated troponin.  Elevated LFTs.      Past Medical History:   Diagnosis Date    Diabetes mellitus     Ovarian cancer     Stroke        No past surgical history on file.    Review of patient's allergies indicates:  No Known  Allergies    No current facility-administered medications on file prior to encounter.     No current outpatient medications on file prior to encounter.     Family History    Reviewed and Not Pertinent       Tobacco Use    Smoking status: Not on file    Smokeless tobacco: Not on file   Substance and Sexual Activity    Alcohol use: Not on file    Drug use: Not on file    Sexual activity: Not on file     Review of Systems   Unable to perform ROS: Mental status change   Objective:     Vital Signs (Most Recent):  Temp: 98.9 °F (37.2 °C) (05/03/23 2149)  Pulse: 89 (05/03/23 2321)  Resp: (!) 33 (05/03/23 2316)  BP: (!) 148/99 (05/03/23 2316)  SpO2: 95 % (05/03/23 2316)   Vital Signs (24h Range):  Temp:  [98.9 °F (37.2 °C)] 98.9 °F (37.2 °C)  Pulse:  [] 89  Resp:  [19-33] 33  SpO2:  [94 %-95 %] 95 %  BP: (148-236)/() 148/99        There is no height or weight on file to calculate BMI.    Physical Exam  Vitals and nursing note reviewed.   Constitutional:       General: She is not in acute distress.     Appearance: She is morbidly obese. She is ill-appearing.   HENT:      Head: Normocephalic and atraumatic.      Mouth/Throat:      Mouth: Mucous membranes are dry.   Eyes:      General: No scleral icterus.     Conjunctiva/sclera: Conjunctivae normal.   Cardiovascular:      Rate and Rhythm: Regular rhythm. Tachycardia present.      Heart sounds: No murmur heard.  Pulmonary:      Effort: Pulmonary effort is normal. No respiratory distress.      Breath sounds: Normal breath sounds. No wheezing.   Abdominal:      General: There is distension.      Palpations: Abdomen is soft.      Comments: Obese abdomen   Musculoskeletal:         General: No swelling.      Cervical back: No rigidity.   Skin:     General: Skin is warm.      Coloration: Skin is not jaundiced.   Neurological:      Comments: Patient is currently aphasic.  Eyes open, but does not follow commands.  Responds to painful stimuli.           Significant  Labs: All pertinent labs within the past 24 hours have been reviewed.  BMP:   Recent Labs   Lab 05/03/23 2301   *      K 4.6      CO2 21*   BUN 60*   CREATININE 3.5*   CALCIUM 9.5     CBC:   Recent Labs   Lab 05/03/23 2301   WBC 20.65*   HGB 9.3*   HCT 28.4*   *     CMP:   Recent Labs   Lab 05/03/23 2301      K 4.6      CO2 21*   *   BUN 60*   CREATININE 3.5*   CALCIUM 9.5   PROT 8.6*   ALBUMIN 2.3*   BILITOT 1.3*   ALKPHOS 172*   *   *   ANIONGAP 20*     Troponin:   Recent Labs   Lab 05/03/23 2301   TROPONINI 0.259*       Significant Imaging: I have reviewed all pertinent imaging results/findings within the past 24 hours.  I have reviewed and interpreted all pertinent imaging results/findings within the past 24 hours.    Imaging Results              CT Head Without Contrast (Final result)  Result time 05/03/23 22:50:33      Final result by Tammie Peña MD (05/03/23 22:50:33)                   Impression:      No acute finding as visualized with image degradation      Electronically signed by: Tammie Peña  Date:    05/03/2023  Time:    22:50               Narrative:    EXAMINATION:  CT HEAD WITHOUT CONTRAST    CLINICAL HISTORY:  Mental status change, unknown cause;    TECHNIQUE:  Low dose axial images were obtained through the head.  Coronal and sagittal reformations were also performed. Contrast was not administered.    COMPARISON:  None.    FINDINGS:  Iterative technique for diminishing radiation exposure automated dose    No acute intracranial hemorrhage or mass effect as visualized motion degradation.  No hydrocephalus.  Mild chronic changes.  Right scalp swelling or contusion.  No displaced skull fracture                                       X-Ray Chest AP Portable (Final result)  Result time 05/03/23 22:41:52      Final result by Tammie Peña MD (05/03/23 22:41:52)                   Impression:      No active  finding      Electronically signed by: Tammie Georgie Hidalgoivette  Date:    05/03/2023  Time:    22:41               Narrative:    EXAMINATION:  XR CHEST AP PORTABLE    CLINICAL HISTORY:  Sepsis;    TECHNIQUE:  Single frontal portable view of the chest was performed.    COMPARISON:  None    FINDINGS:  No pulmonary consolidation or sizable pleural effusion                                    I have independently reviewed and interpreted the EKG.     I have independently reviewed all pertinent labs within the past 24 hours.    I have independently reviewed, visualized and interpreted all pertinent imaging results within the past 24 hours and discussed the findings with the ED physician, Dr. Garcia          Assessment/Plan:     * Acute metabolic encephalopathy  -patient found down at home, last well-known two days ago  -CT head negative for acute intracranial pathology.    -will proceed with TIA workup.    -patient is currently aphasic, encephalopathic.    -Neurology consult.    -MRI brain in a.m.  -aspirin, statin  -TIA versus uremia versus others in differential  -known history of CVA, unknown baseline    Severe sepsis  This patient does have evidence of infective focus  My overall impression is sepsis.  Source: Urinary Tract  Antibiotics given-   Antibiotics (72h ago, onward)    Start     Stop Route Frequency Ordered    05/04/23 0600  piperacillin-tazobactam (ZOSYN) 4.5 g in dextrose 5 % in water (D5W) 5 % 100 mL IVPB (MB+)         -- IV Every 8 hours (non-standard times) 05/04/23 0454    05/04/23 0600  clindamycin in D5W 600 mg/50 mL IVPB 600 mg         -- IV Every 8 hours (non-standard times) 05/04/23 0454        Latest lactate reviewed-  Recent Labs   Lab 05/03/23  2333   LACTATE 1.3     Organ dysfunction indicated by Acute kidney injury and Acute liver injury    Fluid challenge Ideal Body Weight- The patient's ideal body weight is Ideal body weight: 54.7 kg (120 lb 9.5 oz) which will be used to calculate fluid bolus of  30 ml/kg for treatment of septic shock.      Post- resuscitation assessment No - Post resuscitation assessment not needed       Will Not start Pressors- Levophed for MAP of 65  Source control achieved by:  IV antibiotics.      -afebrile.  HR .  WBC 88999, 0% bands, elevated procalcitonin.    -possible mild UTI versus unclear source of infection.    -continue IV vancomycin, cefepime empirically.    -follow up on cultures.    LUISA (acute kidney injury)  Patient with acute kidney injury likely due to IVVD/dehydration and acute tubular necrosis LUISA is currently worsening. Labs reviewed- Renal function/electrolytes with Estimated Creatinine Clearance: 23.3 mL/min (A) (based on SCr of 3.5 mg/dL (H)). according to latest data. Monitor urine output and serial BMP and adjust therapy as needed. Avoid nephrotoxins and renally dose meds for GFR listed above.     -creatinine elevated 3.5, unknown baseline.    -received NS 30 mL/kilogram bolus in the ED.    -continue NS at 125 cc/hour.    -nephrology consult.      Elevated troponin  -troponin elevated 0.25, in the setting of elevated creatinine 3.5.    -EKG NSR with no ST or T-wave changes.    -Patient is currently encephalopathic/aphasic, unknown if she has CP/SOB.    -trend cardiac enzymes.    -closely monitor on telemetry.  -cardiology consult if enzymes trend up  -aspirin, statin    Type 2 diabetes mellitus with hyperglycemia  -blood sugar elevated 166.    -check hemoglobin A1c.    -low-dose insulin sliding scale.      High anion gap metabolic acidosis  -CO2 15, anion gap 20.  Lactic acid normal 1.3.    -BUN elevated at 60, suggesting possible etiology  -continue IV fluids.    -nephrology consult      Elevated LFTs  -, , .  Total bilirubin 1.3.    -acute hepatitis panel.    -follow up on abdominal ultrasound.    -repeat CMP       Morbid obesity with BMI of 45.0-49.9, adult  Body mass index is 47.83 kg/m². Morbid obesity complicates all aspects of  disease management from diagnostic modalities to treatment. Weight loss encouraged and health benefits explained to patient.         VTE Risk Mitigation (From admission, onward)         Ordered     heparin (porcine) injection 5,000 Units  Every 12 hours         05/04/23 0456     IP VTE HIGH RISK PATIENT  Once         05/04/23 0456     Place sequential compression device  Until discontinued         05/04/23 0456     Place sequential compression device  Until discontinued         05/04/23 0043                     Luis Denis MD  Department of Hospital Medicine  '[facility] - Emergency Dept.

## 2023-05-04 NOTE — CONSULTS
[facility] - Emergency Dept.  Wound Care    Patient Name:  Kenia Roper   MRN:  [id number]  Date: 5/4/2023  Diagnosis: Acute metabolic encephalopathy    History:     Past Medical History:   Diagnosis Date    Diabetes mellitus     Ovarian cancer     Stroke        Social History     Socioeconomic History    Marital status: Unknown   Tobacco Use    Smoking status: Unknown       Precautions:     Allergies as of 05/03/2023    (No Known Allergies)       [facility] Assessment Details/Treatment     Consulted on this 56 y/o F patient due to present on admission wound with purulent drainage to mid thoracic spine. Patient is seen while in ER, she is awake, moaning, eyes open. Admitted with AMS.   Bilateral heels intact. Turned for sacral assessment. Sacrum intact, preventative foam dressing in place.  Foam dressing removed from mid thoracic spine. Area of purple discoloration noted that measures 8x8cm, center of this are multiple small openings noted with purulent drainage expressed on palpation. No induration or fluctuance noted, appears consistent with carbuncle. Recommend general surgery consult to consider I&D. Secure chat message to Dr. Cole with recommendations. Cleansed with saline and gauze dressing applied at this visit.   Recommend pressure injury prevention interventions - turn q2 hours.          05/04/2023

## 2023-05-04 NOTE — ASSESSMENT & PLAN NOTE
-troponin elevated 0.25, in the setting of elevated creatinine 3.5.    -EKG NSR with no ST or T-wave changes.    -Patient is currently encephalopathic/aphasic, unknown if she has CP/SOB.    -trend cardiac enzymes.    -closely monitor on telemetry.  -cardiology consult if enzymes trend up  -aspirin, statin

## 2023-05-04 NOTE — ASSESSMENT & PLAN NOTE
This patient does have evidence of infective focus  My overall impression is sepsis.  Source: Urinary Tract  Antibiotics given-   Antibiotics (72h ago, onward)    Start     Stop Route Frequency Ordered    05/04/23 0600  piperacillin-tazobactam (ZOSYN) 4.5 g in dextrose 5 % in water (D5W) 5 % 100 mL IVPB (MB+)         -- IV Every 8 hours (non-standard times) 05/04/23 0454    05/04/23 0600  clindamycin in D5W 600 mg/50 mL IVPB 600 mg         -- IV Every 8 hours (non-standard times) 05/04/23 0454        Latest lactate reviewed-  Recent Labs   Lab 05/03/23  2333   LACTATE 1.3     Organ dysfunction indicated by Acute kidney injury and Acute liver injury    Fluid challenge Ideal Body Weight- The patient's ideal body weight is Ideal body weight: 54.7 kg (120 lb 9.5 oz) which will be used to calculate fluid bolus of 30 ml/kg for treatment of septic shock.      Post- resuscitation assessment No - Post resuscitation assessment not needed       Will Not start Pressors- Levophed for MAP of 65  Source control achieved by:  IV antibiotics.      -afebrile.  HR .  WBC 97096, 0% bands, elevated procalcitonin.    -possible mild UTI versus unclear source of infection.    -continue IV vancomycin, cefepime empirically.    -follow up on cultures.

## 2023-05-04 NOTE — ASSESSMENT & PLAN NOTE
Patient with acute kidney injury likely due to IVVD/dehydration and acute tubular necrosis LUISA is currently worsening. Labs reviewed- Renal function/electrolytes with Estimated Creatinine Clearance: 23.3 mL/min (A) (based on SCr of 3.5 mg/dL (H)). according to latest data. Monitor urine output and serial BMP and adjust therapy as needed. Avoid nephrotoxins and renally dose meds for GFR listed above.     -creatinine elevated 3.5, unknown baseline.    -received NS 30 mL/kilogram bolus in the ED.    -continue NS at 125 cc/hour.    -nephrology consult.

## 2023-05-04 NOTE — ASSESSMENT & PLAN NOTE
Body mass index is 47.83 kg/m². Morbid obesity complicates all aspects of disease management from diagnostic modalities to treatment. Weight loss encouraged and health benefits explained to patient.

## 2023-05-04 NOTE — NURSING TRANSFER
Nursing Transfer Note      5/4/2023     Reason patient is being transferred: observation     Transfer From: CT/ED    Transfer via stretcher    Transfer with cardiac monitoring, Pt belongings, paused medicine     Transported by Transportation     Telemetry: Box Number [id number], Rate 92, Rhythm sinus, and Telemetry  Yenni    Medicines sent: Aspart, Peperacillin     Any special needs or follow-up needed: Urine sample     Chart send with patient: Yes    Notified: sister     Patient reassessed at: 05/04/2023,  (date, time)  1  Upon arrival to floor: cardiac monitor applied, patient oriented to room, call bell in reach, and bed in lowest position

## 2023-05-04 NOTE — ASSESSMENT & PLAN NOTE
-CO2 15, anion gap 20.  Lactic acid normal 1.3.    -BUN elevated at 60, suggesting possible etiology  -continue IV fluids.    -nephrology consult

## 2023-05-04 NOTE — PLAN OF CARE
EVAL AND TX COMPLETED: facilitated bed mobility with max A x 2, unable to participate in transfers and gait. Recommendations TBD pending further participation

## 2023-05-04 NOTE — HPI
56yo F who is admitted to the hospital medicine service for evaluation of altered mental status, CVA and being found unresponsive at home.  Patient was reportedly found down at home covered in feces and urine with her last known well check around 2 days before this.  Has a known history of CVA and type 2 DM.  Was found to be septic upon presentation with a leukocytosis of 20k and elevated creatinine at 3.5.  She is admitted to the hospital medical service for evaluation of altered mental status and neurologic changes along with workup of her sepsis.  She was found to have a large middle back abscess in her upper back which was actively draining purulence upon admission.  Wound care was consulted and thought she may need surgical debridement.  General surgery was consulted for evaluation.  She is a pending MRI T-spine.  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

## 2023-05-04 NOTE — PT/OT/SLP EVAL
Physical Therapy Evaluation    Patient Name:  Kenia Roper   MRN:  [id number]    Recommendations:     Discharge Recommendations: nursing facility, skilled   Discharge Equipment Recommendations:  (unknown)   Barriers to discharge:  unclear/decreased caregiver assist    Assessment:     Kenia Roper is a 57 y.o. female admitted with a medical diagnosis of Acute metabolic encephalopathy.  She presents with the following impairments/functional limitations: weakness, impaired endurance, impaired functional mobility, gait instability, impaired balance, decreased safety awareness, decreased lower extremity function, decreased coordination, pain which limits safe functional mobility and increases injury related to immobility. Pt was unable to participate fully in PT assessment due to increased confusion but able to follow commands with max cuing. Pt will benefit from continued PT services to promote safe functional mobility.    Rehab Prognosis: Fair; patient would benefit from acute skilled PT services to address these deficits and reach maximum level of function.    Recent Surgery: * No surgery found *      Plan:     During this hospitalization, patient to be seen 3 x/week to address the identified rehab impairments via gait training, therapeutic activities, therapeutic exercises, neuromuscular re-education and progress toward the following goals:    Plan of Care Expires:       Subjective     Chief Complaint: acute metabolic encephalopathy  Patient/Family Comments/goals: unable to clearly state goals  Pain/Comfort:  Pain Rating 1:  (unable to clearly verbalize during session, including complaints)    Patients cultural, spiritual, Congregation conflicts given the current situation: no    Living Environment:  Pt lives in [facility], unclear if pt lives with mother (reported to PT) or daughter (reported to other PT staff)  Prior to admission, patients level of function was unknown at this time.  Equipment used at home:  (unknown).   DME owned (not currently used):  unknown .  Upon discharge, patient will have assistance from unknown.    Objective:     Communicated with nursing and performed chart review via epic prior to session.  Patient found supine with peripheral IV, telemetry, blood pressure cuff, pulse ox (continuous), PureWick  upon PT entry to room.    General Precautions: Standard, fall  Orthopedic Precautions:N/A   Braces: N/A  Respiratory Status: Room air    Exams:  Cognitive Exam:  Patient is oriented to Person, Place, Time, and Situation  Gross Motor Coordination:  impaired  RLE ROM: WFL  RLE Strength: observed to be grossly deconditioned/decreased ability to follow commands  LLE ROM: WFL  LLE Strength: observed to be grossly deconditioned/decreased ability to follow commands    Functional Mobility:  Bed Mobility:     Rolling Left:  maximal assistance and of 2 persons  Rolling Right: maximal assistance and of 2 persons  Scooting: total assistance and of 2 persons      AM-PAC 6 CLICK MOBILITY  Total Score:        Treatment & Education:  Educated pt on benefits of consistent participation in PT services to meet functional goals. Educated pt on supine/seated therex to promote strength and joint mobility. Exercises included AP, LAQ, marching, hip abd/add, QS, GS, heel slides x 10-15 reps. Educated to perform exercises intermittently throughout day to tolerance. Educated pt on importance of sitting OOB to promote endurance and overall activity tolerance. Educated pt on call don't fall policy and use of call button to alert nursing staff of needs (including to assist with returning back to bed). Pt expressed understanding.      Patient left supine, Head Of Bed elevated with all lines intact, call button in reach, and nursing notified.    GOALS:   Multidisciplinary Problems       Physical Therapy Goals          Problem: Physical Therapy    Goal Priority Disciplines Outcome Goal Variances Interventions   Physical Therapy Goal     PT, PT/OT       Description: Pt will perform bed mobility SBA in order to participate in EOB activity.  Pt will perform transfers CGA in order to participate in OOB activity.   Pt will ambulate 50ft CGA with LRAD in order to participate in daily tasks.                         History:     Past Medical History:   Diagnosis Date    Diabetes mellitus     Ovarian cancer     Stroke        History reviewed. No pertinent surgical history.    Time Tracking:     PT Received On: 05/04/23  PT Start Time: 1145     PT Stop Time: 1215  PT Total Time (min): 30 min     Billable Minutes: Evaluation 10 and Therapeutic Activity 20      05/04/2023

## 2023-05-04 NOTE — ASSESSMENT & PLAN NOTE
-patient found down at home, last well-known two days ago  -CT head negative for acute intracranial pathology.    -will proceed with TIA workup.    -patient is currently aphasic, encephalopathic.    -Neurology consult.    -MRI brain in a.m.  -aspirin, statin  -TIA versus uremia versus others in differential  -known history of CVA, unknown baseline

## 2023-05-04 NOTE — PT/OT/SLP EVAL
Speech Language Pathology Evaluation  Cognitive/Bedside Swallow    Patient Name:  Kenia Roper   MRN:  [id number]  Admitting Diagnosis: Acute metabolic encephalopathy    Recommendations:                  General Recommendations:   Ongoing swallow and communication assessment as mentation improves  Diet recommendations:  NPO, NPO   Aspiration Precautions: Frequent oral care and Strict aspiration precautions   General Precautions: Standard, aspiration  Communication strategies:  provide increased time to answer and redirect attention to tasks    History:     Past Medical History:   Diagnosis Date    Diabetes mellitus     Ovarian cancer     Stroke        History reviewed. No pertinent surgical history.    Social History: Patient lives with daughter and is typically oriented per report.  Unknown PLOF or home/occupational responsibilities at this time, as pt a poor historian.  No family at bedside.    Prior Intubation HX:  N/A    Modified Barium Swallow: N/A    XR CHEST AP PORTABLE     CLINICAL HISTORY:  Sepsis;     TECHNIQUE:  Single frontal portable view of the chest was performed.     COMPARISON:  None     FINDINGS:  No pulmonary consolidation or sizable pleural effusion     Impression:     No active finding        Electronically signed by: Tammie Peña  Date:                                            05/03/2023  Time:                                           22:41    MRI BRAIN WITHOUT CONTRAST     CLINICAL HISTORY:  Mental status change, unknown cause;.     TECHNIQUE:  Multiplanar multisequence MR imaging of the brain was performed without contrast.     COMPARISON:  Multiple priors.     FINDINGS:  Intracranial compartment:     Motion severely degrades the exam.     Ventricles and sulci are normal in size for age without evidence of hydrocephalus. No extra-axial blood or fluid collections.     Multiple small scattered acute infarcts some cortical and others within the white matter.  These favor embolic type  infarcts.  Infarcts are noted in the bilateral frontal, and parietal lobes and basal ganglia.  Suspected bilateral occipital infarcts.  No mass lesion, acute hemorrhage, edema or acute infarct.     Normal vascular flow voids are preserved.     Skull/extracranial contents (limited evaluation): Bone marrow signal intensity is normal.     Impression:     Bilateral small scattered acute infarcts favoring embolic type infarcts.     COMMUNICATION  This critical result was discovered/received at 210 p.m..  The critical information above was relayed directly by me by telephone to Shabana cash LPN on 05/04/2023 at 14:17.        Electronically signed by: Delbert Cai  Date:                                            05/04/2023  Time:                                           14:18    Prior diet: Pt consumed a regular diet prior to admission.    Subjective     Pt seen bedside in ED for ST evaluation.  No c/o pain.  No family present.  Assessment limited s/t fluctuating mentation with severe cognitive-linguistic impairment.  Patient goals: Unable to state at this time    Pain/Comfort:  Pain Rating 1: 0/10  Pain Rating Post-Intervention 1: 0/10  Pain Rating 2: 0/10  Pain Rating Post-Intervention 2: 0/10    Respiratory Status: Room air    Objective:     Cognitive Status:    Fluctuating EDMUND/mentation--poor attention, required redirection.  Oriented to person only.  Not oriented to place, time or situation/event.     Receptive Language:   Comprehension:   Impaired. Inconsistent accuracy of 1-unit commands, Y/N responses. Cognition and AMS negatively impacting assessment.    Pragmatics:    Flat affect.  Fluctuating EDMUND    Expressive Language:  Verbal:    Improved verbalizations since admission; however, assessment limited & accuracy inconsistent with y/n, /wh/ questions s/t impaired cognition/AMS.      Motor Speech:  Reduced intelligibility  Impaired secretion management    Voice:   WFL    Oral Musculature Evaluation  Oral  Musculature: unable to assess due to poor participation/comprehension  Volitional Cough: absent  Volitional Swallow: absent  Voice Prior to PO Intake: WFL    Bedside Swallow Eval:   Consistencies Assessed:  Ice chips      Oral Phase:   Decreased closure around utensil  Leakage, mouth breathing  Impaired rotational chew  Oral holding  Absent bolus manipulation-transit  Absent deglutition    Pharyngeal Phase:   Unable to assess--absent oral manipulation/transit and deglutition    Compensatory Strategies  Aspiration precautions    In consideration of the interrelationships between body systems and swallowing, History was taken from chart review. The following are medical conditions present in the patient's history which can result in or be attributed to dysphagia:  Respiratory Morbid obesity   Cardiovascular unknown   Digestive unknown   Infections  Sepsis (severe)  Back abscess    Urinary Current UTI  LUISA   Endocrine Diabetes   Nervous cerebrovascular accident (CVA) -acute and chronic   Skeletal Back abscess, pending MRI spine   Immune None noted in this category   Cancer Ovarian cancer   Psychiatric  unknown   Congenital  unknown   Other Opiates +UDS  Metabolic acidosis/encephalopathy       Treatment: Pt recommended to remain NPO with ongoing bedside swallowing and communication assessment as cognition/EDMUND improves.  Given current clinical status, disposition recommendations pending at this time.    Assessment:     Kenia Roper is a 47 y.o. female with an SLP diagnosis of suspected Dysphagia and Cognitive-Linguistic Impairment in the setting of AMS with dx acute bilateral embolic infarcts (See MRI 5/4/23).  She presents with fluctuating mentation & severe communicative deficits, which limited assessment.  Pt exhibited open mouth/pursed breathing at rest and at increased risk of aspiration s/t reduced secretion management and absent deglutition.  She is recommended to remain NPO with ongoing bedside swallowing and  communication assessment as cognition/EDMUND improves. Disposition recommendations pending clinical improvement at this time.    Goals:   Multidisciplinary Problems       SLP Goals          Problem: SLP    Goal Priority Disciplines Outcome   SLP Goal     SLP    Description: 1.  Pt will consume the least restrictive PO diet with ongoing bedside assessment as mentation improves.    2.  Pt will improve cognitive-linguistic skills to meet basic/functional communicative needs.                       Plan:     Patient to be seen:  2 x/week, 3 x/week   Plan of Care expires:  05/11/23  Plan of Care reviewed with:  patient   SLP Follow-Up:  Yes       Discharge recommendations:  Discharge Facility/Level of Care Needs: nursing facility, skilled   Barriers to Discharge:  None    Time Tracking:     SLP Treatment Date:   05/04/23  Speech Start Time:  1100  Speech Stop Time:  1130     Speech Total Time (min):  30 min    Billable Minutes: Eval 15 minutes  and Eval Swallow and Oral Function 15 minutes    05/04/2023

## 2023-05-04 NOTE — PLAN OF CARE
A207/A207 NICOLLE Roper is a 47 y.o.female admitted on 5/3/2023 for Acute metabolic encephalopathy   Code Status: Full Code MRN: [id number]   Review of patient's allergies indicates:  No Known Allergies  Past Medical History:   Diagnosis Date    Diabetes mellitus     Ovarian cancer     Stroke       PRN meds    acetaminophen, 650 mg, Q6H PRN  dextrose 10%, 12.5 g, PRN  dextrose 10%, 25 g, PRN  hydrALAZINE, 10 mg, Q8H PRN  ondansetron, 4 mg, Q8H PRN  sodium chloride 0.9%, 10 mL, PRN      Pt received from CT/ED. Admit pending due to not passing swallow test. External cath applied. Wound on back assessed and redressed. Initiated q 2 turning, cardiac and glucose monitoring. No falls reported during shift and hourly rounding is complete. Chart check completed. Will continue plan of care.      Orientation: oriented x 4  Sassafras Coma Scale Score: 14     Lead Monitored: Lead II Rhythm: normal sinus rhythm       VTE Required Core Measure: Pharmacological prophylaxis initiated/maintained Last Bowel Movement: 05/03/23  Diet NPO  Voiding Characteristics: external catheter, incontinence  Luca Score: 8  Fall Risk Score: 22  Accucheck [x]   Freq? q4     Lines/Drains/Airways       Peripheral Intravenous Line  Duration                  Peripheral IV - Single Lumen 05/03/23 2300 20 G Right Antecubital <1 day

## 2023-05-04 NOTE — CONSULTS
"..Kenia Roper is a 57 y.o. female for whom nephrology consult has been requested to evaluate and give opinion.     HPI: 57 year old AA female who cannot provide hx at present time admitted with AMS and Scr 3.5; with hydration this has dropped to 3.1; she has a wick device in place and mumbles unable to provide adequate clear hx; BP severely elevated. Also found to have elevated Troponin; was placed on saline hydration and given empiric Abx (Vanco and Cefepime); however, has since been changed to Clinda and Zosyn; she is being taken now for MRI. UOP not formally recorded; baseline eGFR is not known and I could not locate any readings in EHR as well. Other medical issues include DM type 2; elevated LFTs and her morbid obesity. Nephrology consult requested for further evaluation.     PAST MEDICAL HISTORY:  She  has a past medical history of Diabetes mellitus, Ovarian cancer, and Stroke.    PAST SURGICAL HISTORY:  She  has no past surgical history on file.    SOCIAL HISTORY:  She        FAMILY MEDICAL HISTORY:  Her family history is not on file.    Review of patient's allergies indicates:  No Known Allergies     clindamycin (CLEOCIN) IVPB  600 mg Intravenous Q8H    heparin (porcine)  5,000 Units Subcutaneous Q12H    piperacillin-tazobactam (ZOSYN) IVPB  4.5 g Intravenous Q8H       Prior to Admission medications    Not on File        REVIEW OF SYSTEMS:    Unobtainable;    Patient has no fever, fatigue, visual changes, chest pain, edema, cough, dyspnea, nausea, vomiting, constipation, diarrhea, arthralgias, pruritis, dizziness, weakness, depression, confusion.        PHYSICAL EXAM:   height is 5' 4" (1.626 m) and weight is 126.1 kg (278 lb). Her axillary temperature is 99.4 °F (37.4 °C). Her blood pressure is 182/86 (abnormal) and her pulse is 90. Her respiration is 24 (abnormal) and oxygen saturation is 97%.   Gen: WDWN female in mild apparent distress; confused.   Psych: AMS  Skin: No rashes or ulcers  Eyes: Normal " conjunctiva and lids, PERRLA  ENT: Normal hearing with no oropharyngeal lesions  Neck: No JVD  Chest: Clear with no rales, rhonchi, wheezing with normal effort  CV: Regular with no murmurs, gallops or rubs  Abd: Soft, nontender, no distension, positive bowel sounds  Ext: No cyanosis, clubbing or edema          IMPRESSION AND RECOMMENDATIONS:    LUISA versus CKD; baseline unknown and exact etiology unclear  AMS  HTN  Obesity  Leukocytosis; rule out sepsis  Anemia  Proteinuria  Hematuria      Plan:  Patient is seen and examined; with low grade fever and AMS and proteinuria/hematuria sepsis is a consideration; TTP/HUS also lower down in diff dx; check haptoglobin and LDH and retic; no urgent HD needed; C3 and C4; renal US and strict I and O; abx per primary team and pan culture; no urgent HD needed at present time; fortunately eGFR improving serially with fluids/supportive care; agree with Neuro evaluation. FeNa will be calculated and CPK to check for rhabdo will be perfomed.  Will follow closely with you and make further recommendations as necessary.    Gus Olivas MD  [phone]    Gus Olivas MD

## 2023-05-05 PROBLEM — I63.9 CEREBROVASCULAR ACCIDENT (CVA) DUE TO EMBOLISM: Status: ACTIVE | Noted: 2023-05-05

## 2023-05-05 PROBLEM — R74.8 ELEVATED CPK: Status: ACTIVE | Noted: 2023-05-05

## 2023-05-05 LAB
ALBUMIN SERPL BCP-MCNC: 2.1 G/DL (ref 3.5–5.2)
ALP SERPL-CCNC: 141 U/L (ref 55–135)
ALT SERPL W/O P-5'-P-CCNC: 102 U/L (ref 10–44)
ANA SER QL IF: NORMAL
ANION GAP SERPL CALC-SCNC: 14 MMOL/L (ref 8–16)
APTT PPP: 27 SEC (ref 21–32)
AST SERPL-CCNC: 110 U/L (ref 10–40)
BACTERIA UR CULT: NO GROWTH
BASOPHILS # BLD AUTO: 0.09 K/UL (ref 0–0.2)
BASOPHILS NFR BLD: 0.6 % (ref 0–1.9)
BILIRUB SERPL-MCNC: 1 MG/DL (ref 0.1–1)
BUN SERPL-MCNC: 60 MG/DL (ref 6–20)
CALCIUM SERPL-MCNC: 8.9 MG/DL (ref 8.7–10.5)
CHLORIDE SERPL-SCNC: 113 MMOL/L (ref 95–110)
CO2 SERPL-SCNC: 22 MMOL/L (ref 23–29)
CREAT SERPL-MCNC: 2.1 MG/DL (ref 0.5–1.4)
DIFFERENTIAL METHOD: ABNORMAL
EOSINOPHIL # BLD AUTO: 0.2 K/UL (ref 0–0.5)
EOSINOPHIL NFR BLD: 1.1 % (ref 0–8)
ERYTHROCYTE [DISTWIDTH] IN BLOOD BY AUTOMATED COUNT: 13.5 % (ref 11.5–14.5)
EST. GFR  (NO RACE VARIABLE): 29 ML/MIN/1.73 M^2
GLUCOSE SERPL-MCNC: 150 MG/DL (ref 70–110)
HCT VFR BLD AUTO: 23.9 % (ref 37–48.5)
HGB BLD-MCNC: 7.8 G/DL (ref 12–16)
IMM GRANULOCYTES # BLD AUTO: 0.58 K/UL (ref 0–0.04)
IMM GRANULOCYTES NFR BLD AUTO: 3.8 % (ref 0–0.5)
INR PPP: 1.2 (ref 0.8–1.2)
LYMPHOCYTES # BLD AUTO: 1.5 K/UL (ref 1–4.8)
LYMPHOCYTES NFR BLD: 9.5 % (ref 18–48)
MAGNESIUM SERPL-MCNC: 2.6 MG/DL (ref 1.6–2.6)
MCH RBC QN AUTO: 21.7 PG (ref 27–31)
MCHC RBC AUTO-ENTMCNC: 32.6 G/DL (ref 32–36)
MCV RBC AUTO: 66 FL (ref 82–98)
MONOCYTES # BLD AUTO: 0.9 K/UL (ref 0.3–1)
MONOCYTES NFR BLD: 5.7 % (ref 4–15)
NEUTROPHILS # BLD AUTO: 12.1 K/UL (ref 1.8–7.7)
NEUTROPHILS NFR BLD: 79.3 % (ref 38–73)
NRBC BLD-RTO: 2 /100 WBC
PHOSPHATE SERPL-MCNC: 3.3 MG/DL (ref 2.7–4.5)
PLATELET # BLD AUTO: 520 K/UL (ref 150–450)
PMV BLD AUTO: 10.8 FL (ref 9.2–12.9)
POCT GLUCOSE: 133 MG/DL (ref 70–110)
POCT GLUCOSE: 158 MG/DL (ref 70–110)
POCT GLUCOSE: 174 MG/DL (ref 70–110)
POCT GLUCOSE: 184 MG/DL (ref 70–110)
POTASSIUM SERPL-SCNC: 3.6 MMOL/L (ref 3.5–5.1)
PROT SERPL-MCNC: 7.4 G/DL (ref 6–8.4)
PROTHROMBIN TIME: 13 SEC (ref 9–12.5)
RBC # BLD AUTO: 3.6 M/UL (ref 4–5.4)
SODIUM SERPL-SCNC: 149 MMOL/L (ref 136–145)
WBC # BLD AUTO: 15.21 K/UL (ref 3.9–12.7)

## 2023-05-05 PROCEDURE — 84100 ASSAY OF PHOSPHORUS: CPT | Performed by: INTERNAL MEDICINE

## 2023-05-05 PROCEDURE — 92526 ORAL FUNCTION THERAPY: CPT

## 2023-05-05 PROCEDURE — 99223 PR INITIAL HOSPITAL CARE,LEVL III: ICD-10-PCS | Mod: ,,,

## 2023-05-05 PROCEDURE — 99222 PR INITIAL HOSPITAL CARE,LEVL II: ICD-10-PCS | Mod: 95,,, | Performed by: PSYCHIATRY & NEUROLOGY

## 2023-05-05 PROCEDURE — 85730 THROMBOPLASTIN TIME PARTIAL: CPT | Performed by: INTERNAL MEDICINE

## 2023-05-05 PROCEDURE — 97530 THERAPEUTIC ACTIVITIES: CPT

## 2023-05-05 PROCEDURE — 36415 COLL VENOUS BLD VENIPUNCTURE: CPT | Performed by: INTERNAL MEDICINE

## 2023-05-05 PROCEDURE — 99232 SBSQ HOSP IP/OBS MODERATE 35: CPT | Mod: ,,,

## 2023-05-05 PROCEDURE — 83735 ASSAY OF MAGNESIUM: CPT | Performed by: INTERNAL MEDICINE

## 2023-05-05 PROCEDURE — 63600175 PHARM REV CODE 636 W HCPCS: Performed by: INTERNAL MEDICINE

## 2023-05-05 PROCEDURE — 25000003 PHARM REV CODE 250: Performed by: INTERNAL MEDICINE

## 2023-05-05 PROCEDURE — 99232 PR SUBSEQUENT HOSPITAL CARE,LEVL II: ICD-10-PCS | Mod: ,,,

## 2023-05-05 PROCEDURE — 99223 1ST HOSP IP/OBS HIGH 75: CPT | Mod: ,,,

## 2023-05-05 PROCEDURE — 80053 COMPREHEN METABOLIC PANEL: CPT | Performed by: INTERNAL MEDICINE

## 2023-05-05 PROCEDURE — 21400001 HC TELEMETRY ROOM

## 2023-05-05 PROCEDURE — 85610 PROTHROMBIN TIME: CPT | Performed by: INTERNAL MEDICINE

## 2023-05-05 PROCEDURE — 99222 1ST HOSP IP/OBS MODERATE 55: CPT | Mod: 95,,, | Performed by: PSYCHIATRY & NEUROLOGY

## 2023-05-05 PROCEDURE — 99232 PR SUBSEQUENT HOSPITAL CARE,LEVL II: ICD-10-PCS | Mod: ,,, | Performed by: INTERNAL MEDICINE

## 2023-05-05 PROCEDURE — 99232 SBSQ HOSP IP/OBS MODERATE 35: CPT | Mod: ,,, | Performed by: INTERNAL MEDICINE

## 2023-05-05 PROCEDURE — 97116 GAIT TRAINING THERAPY: CPT

## 2023-05-05 PROCEDURE — 92507 TX SP LANG VOICE COMM INDIV: CPT

## 2023-05-05 PROCEDURE — 85025 COMPLETE CBC W/AUTO DIFF WBC: CPT | Performed by: INTERNAL MEDICINE

## 2023-05-05 RX ORDER — LORAZEPAM 2 MG/ML
0.5 INJECTION INTRAMUSCULAR ONCE AS NEEDED
Status: COMPLETED | OUTPATIENT
Start: 2023-05-05 | End: 2023-05-05

## 2023-05-05 RX ORDER — CLOPIDOGREL BISULFATE 75 MG/1
75 TABLET ORAL DAILY
Status: DISCONTINUED | OUTPATIENT
Start: 2023-05-05 | End: 2023-05-07

## 2023-05-05 RX ORDER — NAPROXEN SODIUM 220 MG/1
81 TABLET, FILM COATED ORAL DAILY
Status: DISCONTINUED | OUTPATIENT
Start: 2023-05-05 | End: 2023-05-07

## 2023-05-05 RX ORDER — SODIUM CHLORIDE 450 MG/100ML
INJECTION, SOLUTION INTRAVENOUS CONTINUOUS
Status: DISCONTINUED | OUTPATIENT
Start: 2023-05-05 | End: 2023-05-05

## 2023-05-05 RX ORDER — ATORVASTATIN CALCIUM 40 MG/1
40 TABLET, FILM COATED ORAL NIGHTLY
Status: DISCONTINUED | OUTPATIENT
Start: 2023-05-05 | End: 2023-05-06

## 2023-05-05 RX ORDER — DEXTROSE MONOHYDRATE 50 MG/ML
INJECTION, SOLUTION INTRAVENOUS CONTINUOUS
Status: DISCONTINUED | OUTPATIENT
Start: 2023-05-05 | End: 2023-05-05

## 2023-05-05 RX ADMIN — CLINDAMYCIN PHOSPHATE 600 MG: 600 INJECTION, SOLUTION INTRAVENOUS at 09:05

## 2023-05-05 RX ADMIN — HEPARIN SODIUM 5000 UNITS: 5000 INJECTION INTRAVENOUS; SUBCUTANEOUS at 08:05

## 2023-05-05 RX ADMIN — CLINDAMYCIN PHOSPHATE 600 MG: 600 INJECTION, SOLUTION INTRAVENOUS at 07:05

## 2023-05-05 RX ADMIN — LORAZEPAM 0.5 MG: 2 INJECTION INTRAMUSCULAR; INTRAVENOUS at 05:05

## 2023-05-05 RX ADMIN — CLINDAMYCIN PHOSPHATE 600 MG: 600 INJECTION, SOLUTION INTRAVENOUS at 12:05

## 2023-05-05 RX ADMIN — PIPERACILLIN SODIUM AND TAZOBACTAM SODIUM 4.5 G: 4; .5 INJECTION, POWDER, FOR SOLUTION INTRAVENOUS at 05:05

## 2023-05-05 RX ADMIN — PIPERACILLIN SODIUM AND TAZOBACTAM SODIUM 4.5 G: 4; .5 INJECTION, POWDER, FOR SOLUTION INTRAVENOUS at 09:05

## 2023-05-05 RX ADMIN — ATORVASTATIN CALCIUM 40 MG: 40 TABLET, FILM COATED ORAL at 08:05

## 2023-05-05 RX ADMIN — ASPIRIN 81 MG CHEWABLE TABLET 81 MG: 81 TABLET CHEWABLE at 08:05

## 2023-05-05 RX ADMIN — CLOPIDOGREL BISULFATE 75 MG: 75 TABLET ORAL at 08:05

## 2023-05-05 RX ADMIN — HYDRALAZINE HYDROCHLORIDE 10 MG: 20 INJECTION, SOLUTION INTRAMUSCULAR; INTRAVENOUS at 08:05

## 2023-05-05 RX ADMIN — PIPERACILLIN SODIUM AND TAZOBACTAM SODIUM 4.5 G: 4; .5 INJECTION, POWDER, FOR SOLUTION INTRAVENOUS at 02:05

## 2023-05-05 NOTE — PROGRESS NOTES
[facility]  General Surgery  Progress Note    Subjective:     History of Present Illness:  56yo F who is admitted to the [facility] medicine service for evaluation of altered mental status, CVA and being found unresponsive at home.  Patient was reportedly found down at home covered in feces and urine with her last known well check around 2 days before this.  Has a known history of CVA and type 2 DM.  Was found to be septic upon presentation with a leukocytosis of 20k and elevated creatinine at 3.5.  She is admitted to the [facility] medical service for evaluation of altered mental status and neurologic changes along with workup of her sepsis.  She was found to have a large middle back abscess in her upper back which was actively draining purulence upon admission.  Wound care was consulted and thought she may need surgical debridement.  General surgery was consulted for evaluation.  She is a pending MRI T-spine.  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      Post-Op Info:  * No surgery found *         Interval History: Large middle back abscess continues to drain purulence. Afebrile. Leukocytosis downtrend.     Medications:  Continuous Infusions:  Scheduled Meds:   clindamycin (CLEOCIN) IVPB  600 mg Intravenous Q8H    heparin (porcine)  5,000 Units Subcutaneous Q12H    piperacillin-tazobactam (ZOSYN) IVPB  4.5 g Intravenous Q8H     PRN Meds:acetaminophen, dextrose 10%, dextrose 10%, hydrALAZINE, ondansetron, sodium chloride 0.9%     Review of patient's allergies indicates:  No Known Allergies  Objective:     Vital Signs (Most Recent):  Temp: 98.1 °F (36.7 °C) (05/05/23 0737)  Pulse: 79 (05/05/23 0737)  Resp: 20 (05/05/23 0737)  BP: (!) 179/81 (05/05/23 0737)  SpO2: (!) 92 % (05/05/23 0737) Vital Signs (24h Range):  Temp:  [97.1 °F (36.2 °C)-99.1 °F (37.3 °C)] 98.1 °F (36.7 °C)  Pulse:  [79-93] 79  Resp:  [18-28] 20  SpO2:  [92 %-100 %] 92 %  BP: (134-210)/(81-99)  179/81     Weight: 126.1 kg (278 lb)  Body mass index is 47.72 kg/m².    Intake/Output - Last 3 Shifts         05/03 0700  05/04 0659 05/04 0700  05/05 0659 05/05 0700  05/06 0659    I.V. (mL/kg)  900.9 (7.1)     IV Piggyback 1100 175.3     Total Intake(mL/kg) 1100 (8.7) 1076.2 (8.5)     Urine (mL/kg/hr)  800 (0.3) 500 (1.4)    Total Output  800 500    Net +1100 +276.2 -500                    Physical Exam  Constitutional:       Appearance: She is well-developed. She is obese.   HENT:      Head: Normocephalic and atraumatic.   Eyes:      Conjunctiva/sclera: Conjunctivae normal.   Neck:      Thyroid: No thyromegaly.   Cardiovascular:      Rate and Rhythm: Normal rate.   Pulmonary:      Effort: Pulmonary effort is normal. No respiratory distress.   Abdominal:      General: There is no distension.      Palpations: Abdomen is soft. There is no mass.      Tenderness: There is no abdominal tenderness.   Skin:     Comments: Midline T-spine with actively purulent draining area of fluctuance and induration consistent with abscess. Probed with cotton tipped applicator and abscess cavity is at least 5cm deep and measures around 8cm x 9cm.    Neurological:      Mental Status: She is alert.        Significant Labs:  I have reviewed all pertinent lab results within the past 24 hours.  CBC:   Recent Labs   Lab 05/05/23  0433   WBC 15.21*   RBC 3.60*   HGB 7.8*   HCT 23.9*   *   MCV 66*   MCH 21.7*   MCHC 32.6     BMP:   Recent Labs   Lab 05/05/23  0433   *   *   K 3.6   *   CO2 22*   BUN 60*   CREATININE 2.1*   CALCIUM 8.9   MG 2.6       Significant Diagnostics:  I have reviewed all pertinent imaging results/findings within the past 24 hours.    Assessment/Plan:     * Acute metabolic encephalopathy  Care per primary team    Back abscess  48yo F with large abscess in upper back/T-spine area    - No emergent surgical intervention at this time  - patient will likely need this abscess drained surgically  however this needs to be establish whether this is communicating with the thoracic spine or anything near the spinal column. Consulted neurosurgery/spine to manage this.  - recommend MRI T-spine for evaluation pending neurosurgery recommendations  - agree with IV antibiotics  - rest of care per primary team    High anion gap metabolic acidosis  Care per primary team    Type 2 diabetes mellitus with hyperglycemia  Care per primary team    Elevated troponin  Care per primary team    LUISA (acute kidney injury)  Care per primary team/nephrology    Severe sepsis  Care per primary team    Morbid obesity with BMI of 45.0-49.9, adult  Care per primary team        Warren Ramachandran PA-C  General Surgery  [facility] - Telemetry ([facility])

## 2023-05-05 NOTE — PLAN OF CARE
SW meet with patient at bedside to complete discharge initial assessment. Patient's DCFS  Luli Tierney, was also at bedside to verify information on patient.   SW completed discharge assessment and gave patient list for rehab's for DC plan.    SW was stopped at nurses station by DCFS  to give more details about patient. Per DCFS , Patient lives with daughter, who is currently pregnant and 6 year old grandson. DCFS  stated that the Patient and daughter do not get out of the house often and were recently homeless, where patient did not have all necessary medications. DCFS got involved for shelter issues and continue to follow with Patient. DCFS  stated that she was trying to get in-touch with [address]  to get Patient and family into new home. DCFS  inquired about mental health evaluation, due to Patient stating she lives with her mother - who has been  about 2 years not. SW stated we did have psychiatry and we could get her evaluated. SW stated that there would be other CM over the weekend but if patient was still here Monday, I would be CM. SW provided DCFS  with name and number to direct phone.     SW will continue to follow and assist as needed.

## 2023-05-05 NOTE — PLAN OF CARE
Nutrition Recommendations 5/5:  1. When medically appropriate, recommend Diabetic, Cardiac PO diet, safest diet texture Per SLP recommendations   2. If Enteral nutrition warranted, recommend initiate Diabetisource AC via NG/OG tube, goal rate 50 mL/hr, starting at 10 mL/hr, then advance within 24 hrs if pt is tolerating, or Per MD/NP   -Formula provides: 1440 kcals/day (77% EEN), 72 g protein/day (100% protein needs), 979 mL free formula water   -150 mL q4h free water flushes (900 mL/day) = total from formula + FWF = 1879 mL water/day, Per MD/NP   -Check Mg, K+, Na, Phos and Glu before and during initiation, correct as indicated   3. Recommend Faraz BID for wound healing needs   4. Recommend daily weights  5. Collaboration of care with medical providers     Goals:  1. Pt will tolerate and consume >50% est. Needs by RD follow up   2. If EN warranted, Pt will tolerate TF at goal rate by RD follow up   3. Pt will receive Faraz prior to RD follow up     Sumi Loera, Registration Eligible, Provisional LDN

## 2023-05-05 NOTE — ASSESSMENT & PLAN NOTE
Elevation likely secondary to muscle breakdown as patient was found down at home for unknown period of time  Has been treated with IV fluid   Repeat CPK in the morning

## 2023-05-05 NOTE — PLAN OF CARE
Discussed Plan of Care with patient and verbalized understanding - Patient remains AAOx2 - remains free of falls, accidents and trauma during the day shift. Bed is in the low position and the call light is within reach. Patient diet advanced from NPO to diet recommended by SLP, tolerating well.  Will continue to monitor

## 2023-05-05 NOTE — ASSESSMENT & PLAN NOTE
Blood sugar mildly elevated 166 on admission  Hemoglobin A1c 7.1    Accu-Cheks with SSI  Diabetic diet  Optimize diabetic regimen in setting of acute embolic stroke

## 2023-05-05 NOTE — ASSESSMENT & PLAN NOTE
Patient found down at home, last well-known two days prior to ED presentation  History of CVA, lives at home with family and apparently able to participate in ADLs including babysitting her grandchild  CT head negative for acute intracranial pathology, however MRI brain revealed bilateral small acute infarcts consistent with embolic stroke  Also found to have back abscess on CT of thoracic spine  Mentation improved with initiation of IV antibiotics  MRA brain and MRI of thoracic spine pending  Neurology recs

## 2023-05-05 NOTE — CONSULTS
"..Kenia Roper is a 47 y.o. female for whom nephrology consult has been requested to evaluate and give opinion.     HPI: 57 year old AA female who cannot provide hx at present time admitted with AMS and Scr 3.5; with hydration this has dropped to 3.1; she has a wick device in place and mumbles unable to provide adequate clear hx; BP severely elevated. Also found to have elevated Troponin; was placed on saline hydration and given empiric Abx (Vanco and Cefepime); however, has since been changed to Clinda and Zosyn; she is being taken now for MRI. UOP not formally recorded; baseline eGFR is not known and I could not locate any readings in EHR as well. Other medical issues include DM type 2; elevated LFTs and her morbid obesity. Nephrology consult requested for further evaluation.     05/05/2023:  Patient was seen in her hospital room.  In bed resting comfortably.  No acute distress noted.    PAST MEDICAL HISTORY:  She  has a past medical history of Diabetes mellitus, Ovarian cancer, and Stroke.    PAST SURGICAL HISTORY:  She  has no past surgical history on file.    SOCIAL HISTORY:  She        FAMILY MEDICAL HISTORY:  Her family history is not on file.    Review of patient's allergies indicates:  No Known Allergies     clindamycin (CLEOCIN) IVPB  600 mg Intravenous Q8H    heparin (porcine)  5,000 Units Subcutaneous Q12H    piperacillin-tazobactam (ZOSYN) IVPB  4.5 g Intravenous Q8H       Prior to Admission medications    Not on File        REVIEW OF SYSTEMS:    Unobtainable;    Patient has no fever, fatigue, visual changes, chest pain, edema, cough, dyspnea, nausea, vomiting, constipation, diarrhea, arthralgias, pruritis, dizziness, weakness, depression, confusion.        PHYSICAL EXAM:   height is 5' 4" (1.626 m) and weight is 126.1 kg (278 lb). Her axillary temperature is 96.7 °F (35.9 °C). Her blood pressure is 198/93 (abnormal) and her pulse is 79. Her respiration is 18 and oxygen saturation is 99%.   Gen: WDWN " female in mild apparent distress; confused.   Psych: AMS  Skin: No rashes or ulcers  Eyes: Normal conjunctiva and lids, PERRLA  ENT: Normal hearing with no oropharyngeal lesions  Neck: No JVD  Chest: Clear with no rales, rhonchi, wheezing with normal effort  CV: Regular with no murmurs, gallops or rubs  Abd: Soft, nontender, no distension, positive bowel sounds  Ext: No cyanosis, clubbing or edema        Assessment and plan:    1. LUISA:  Appears to be secondary to prerenal azotemia.  Fractional excretion of sodium calculates to 0.6%.    Creatinine has improved since admission decreasing from 3.5 down to 2.1 this morning with IV hydration.    Baseline creatinine is not known.  Renal ultrasound however was without abnormality.  She is nonoliguric with over 800 cc reported yesterday and 500 cc reported so far today.    2. Potassium is stable at 3.6.    Patient is improving from a Nephrology standpoint.  Will sign off.  Please recall if needed.    Approximately 40 minutes was spent in face-to-face conversation and chart review.

## 2023-05-05 NOTE — ASSESSMENT & PLAN NOTE
Body mass index is 47.72 kg/m². Morbid obesity complicates all aspects of disease management from diagnostic modalities to treatment. Weight loss encouraged and health benefits explained to patient.

## 2023-05-05 NOTE — CONSULTS
[facility] - Telemetry ([facility])  Adult Nutrition  Consult Note    SUMMARY     Recommendations    Goals:   1. When medically appropriate, recommend Diabetic, Cardiac PO diet, safest diet texture Per SLP recommendations   2. If Enteral nutrition warranted, recommend initiate Diabetisource AC via NG/OG tube, goal rate 50 mL/hr, starting at 10 mL/hr, then advance within 24 hrs if pt is tolerating, or Per MD/NP   -Formula provides: 1440 kcals/day (77% EEN), 72 g protein/day (100% protein needs), 979 mL free formula water   -150 mL q4h free water flushes (900 mL/day) = total from formula + FWF = 1879 mL water/day, Per MD/NP   -Check Mg, K+, Na, Phos and Glu before and during initiation, correct as indicated   3. Recommend Faraz BID for wound healing needs   4. Recommend daily weights    Goals:  1. Pt will tolerate and consume >50% est. Needs by RD follow up   2. If EN warranted, Pt will tolerate TF at goal rate by RD follow up   3. Pt will receive Faraz prior to RD follow up   Nutrition Goal Status: new  Communication of RD Recs: other (comment); (POC, sticky note)    Assessment and Plan    Nutrition Problem  Inadequate oral intake  Increased nutrient needs    Related to (etiology):   Decreased ability to consume sufficient protein/energy  Increased demand for nutrition     Signs and Symptoms (as evidenced by):   NPO due to AMS  Diabetic with wound healing needs     Interventions(treatment strategy):  1. Carbohydrate, Sodium and Fat, Texture modified diet  2. Enteral nutrition   3. Wound healing medical food supplement therapy  4. Collaboration of care with medical providers     Nutrition Diagnosis Status:   New         Malnutrition Assessment     Skin (Micronutrient): dry, edema, wounds unhealed (Luca score = 8 (very high risk))       Fluid Accumulation (Malnutrition):  (1+ trace)                         Reason for Assessment    Reason For Assessment: consult (pt npo)  Diagnosis:  (Acute metabolic  "encephalopathy)  Relevant Medical History: DMT2, LUISA, Severe sepsis, Obesity, Back abscess, Elevated troponin, High anion gap metabolic acidosis, Elevated LFT's  Hx: Stroke/CVA, Ovarian cancer  General Information Comments:   5/5/23: 47 y.o. Female admitted for Acute metabolic encephalopathy. Pt noted with AMS. Spoke to RN, confirmed pt is not experiencing any N/V/D. H&P 5/4 noted pt was found down at home covered in feces and urine, brought to ED in unresponsive state, last well known x 2 days ago. Per SLP note 5/4 "SLP diagnosis of suspected Dysphagia and Cognitive-Linguistic Impairment in the setting of AMS with dx acute bilateral embolic infarcts (See MRI 5/4/23).  She presents with fluctuating mentation & severe communicative deficits, which limited assessment; She is recommended to remain NPO with ongoing bedside swallowing and communication assessment as cognition/EDMUND improves". Reviewed chart: LBM 5/3; Skin: Per wound care note 5/4 "...present on admission wound with purulent drainage to mid thoracic spine; Admitted with AMS; Sacrum intact;  Area of purple discoloration..., center of this are multiple small openings noted with purulent drainage expressed on palpation. No induration or fluctuance noted, appears consistent with carbuncle. Recommend general surgery consult to consider I&D"; Luca score: 8 (very high risk); Edema: 1+ trace bilateral foot/ankle/knee/leg. Labs, meds, weight reviewed. Labs 5/4 Albumin (L), Na (H), Cl (H), Glu (H), BUN (H), Cr (H), Alk phos (H), Ast (H), ALT (H), eGFR 29. Note Zosyn in D5W, heparin, Abx. Weight charted 5/4 278 lbs (BMI 47.72). RD will continue to monitor.  Nutrition Discharge Planning: Diabetic, Cardiac diet/ Dependent on medical treatment    Nutrition Risk Screen    Nutrition Risk Screen: large or nonhealing wound, burn or pressure injury, difficulty chewing/swallowing    Nutrition/Diet History    Spiritual, Cultural Beliefs, Hindu Practices, Values that " "Affect Care: no  Food Allergies: NKFA  Factors Affecting Nutritional Intake: impaired cognitive status/motor control, chewing difficulties/inability to chew food, difficulty/impaired swallowing, inability to feed self, NPO    Anthropometrics    Temp: 98.1 °F (36.7 °C)  Height: 5' 4" (162.6 cm)  Height (inches): 64 in  Weight Method: Bed Scale  Weight: 126.1 kg (278 lb)  Weight (lb): 278 lb  Ideal Body Weight (IBW), Female: 120 lb  % Ideal Body Weight, Female (lb): 231.67 %  BMI (Calculated): 47.7  BMI Grade: greater than 40 - morbid obesity     Wt Readings from Last 15 Encounters:   05/05/23 126.1 kg (278 lb)     Lab/Procedures/Meds    Pertinent Labs Reviewed: reviewed  Pertinent Labs Comments: Albumin (L), Na (H), Cl (H), Glu (H), BUN (H), Cr (H), Alk phos (H), Ast (H), ALT (H), eGFR 29  Pertinent Medications Reviewed: reviewed  Pertinent Medications Comments: Zosyn in D5W, heparin, Abx  BMP  Lab Results   Component Value Date     (H) 05/05/2023    K 3.6 05/05/2023     (H) 05/05/2023    CO2 22 (L) 05/05/2023    BUN 60 (H) 05/05/2023    CREATININE 2.1 (H) 05/05/2023    CALCIUM 8.9 05/05/2023    ANIONGAP 14 05/05/2023    EGFRNORACEVR 29 (A) 05/05/2023     Lab Results   Component Value Date     (H) 05/05/2023     (H) 05/05/2023    ALKPHOS 141 (H) 05/05/2023    BILITOT 1.0 05/05/2023     Lab Results   Component Value Date    ALBUMIN 2.1 (L) 05/05/2023     Recent Labs   Lab 05/05/23  0432   POCTGLUCOSE 158*     Scheduled Meds:   clindamycin (CLEOCIN) IVPB  600 mg Intravenous Q8H    heparin (porcine)  5,000 Units Subcutaneous Q12H    piperacillin-tazobactam (ZOSYN) IVPB  4.5 g Intravenous Q8H     Continuous Infusions:  PRN Meds:.acetaminophen, dextrose 10%, dextrose 10%, hydrALAZINE, ondansetron, sodium chloride 0.9%    Physical Findings/Assessment         Estimated/Assessed Needs    Weight Used For Calorie Calculations: 126.1 kg (278 lb)  Energy Calorie Requirements (kcal): 1881 kcals (MSJ (DM, " Obese BMI 47.72)  Energy Need Method: [place]- [place]riele  Protein Requirements: 58-73 g (0.8-1.0 g/kg Adj BW (LUISA, no dialysis, 231.67% IBW)  Weight Used For Protein Calculations: 72.5 kg (159 lb 13.3 oz) (Adj BW)  Fluid Requirements (mL): 500 + total output or Per MD/NP (LUISA)  Estimated Fluid Requirement Method: other (see comments)  RDA Method (mL): 1881  CHO Requirement: 235 (1881 kcals/8)      Nutrition Prescription Ordered    Current Diet Order: NPO    Evaluation of Received Nutrient/Fluid Intake  I/O: (Net since admit)  5/5: +1376.2 mL    % Kcal Needs: 0  % Protein Needs: 0    Energy Calories Required: not meeting needs  Protein Required: not meeting needs  Fluid Required: exceeds needs  Tolerance:  (Currently no intake)  % Intake of Estimated Energy Needs: Currently no intake  % Meal Intake: NPO    Nutrition Risk    Level of Risk/Frequency of Follow-up: high (F/u x 2 weekly)       Monitor and Evaluation    Food and Nutrient Intake: energy intake, food and beverage intake, enteral nutrition intake  Food and Nutrient Adminstration: diet order, enteral and parenteral nutrition administration  Knowledge/Beliefs/Attitudes: food and nutrition knowledge/skill, beliefs and attitudes  Anthropometric Measurements: weight, weight change, body mass index  Biochemical Data, Medical Tests and Procedures: electrolyte and renal panel, gastrointestinal profile, glucose/endocrine profile, inflammatory profile, lipid profile       Nutrition Follow-Up    RD Follow-up?: Yes  Sumi Loera, Registration Eligible, Provisional LDN

## 2023-05-05 NOTE — PLAN OF CARE
TX COMPLETED: facilitated bed mobility with max A, transfers with mod A, ambulated 3 ft with max A and RW. Recommend rehab

## 2023-05-05 NOTE — ASSESSMENT & PLAN NOTE
This patient does have evidence of infective focus  My overall impression is sepsis.  Source: Urinary Tract and Skin and Soft Tissue (location Back)  Antibiotics given-   Antibiotics (72h ago, onward)    Start     Stop Route Frequency Ordered    05/04/23 0800  piperacillin-tazobactam (ZOSYN) 4.5 g in dextrose 5 % in water (D5W) 5 % 100 mL IVPB (MB+)         -- IV Every 8 hours (non-standard times) 05/04/23 0454    05/04/23 0600  clindamycin in D5W 600 mg/50 mL IVPB 600 mg         -- IV Every 8 hours (non-standard times) 05/04/23 0454        Latest lactate reviewed-  Recent Labs   Lab 05/03/23  2333   LACTATE 1.3     Organ dysfunction indicated by Acute kidney injury and Acute liver injury    Fluid challenge Ideal Body Weight- The patient's ideal body weight is Ideal body weight: 54.7 kg (120 lb 9.5 oz) which will be used to calculate fluid bolus of 30 ml/kg for treatment of septic shock.      Post- resuscitation assessment No - Post resuscitation assessment not needed       Will Not start Pressors- Levophed for MAP of 65  Source control achieved by:  IV antibiotics.      -afebrile.  HR .  WBC 94711, 0% bands, elevated procalcitonin on admission  -Leukocytosis downtrending   -possible multiple etiologies of infectious process secondary to UTI and back abscess  -blood cultures NGTD, urine culture in process, will follow up  -continue IV clindamycin, cefepime empirically, deescalate based on culture

## 2023-05-05 NOTE — ASSESSMENT & PLAN NOTE
48yo F with large abscess in upper back/T-spine area    - No emergent surgical intervention at this time  - patient will likely need this abscess drained surgically however this needs to be establish whether this is communicating with the thoracic spine or anything near the spinal column. Consulted neurosurgery/spine to manage this.  - recommend MRI T-spine for evaluation pending neurosurgery recommendations  - agree with IV antibiotics  - rest of care per primary team

## 2023-05-05 NOTE — ASSESSMENT & PLAN NOTE
Patient with acute kidney injury likely due to IVVD/dehydration and acute tubular necrosis LUISA is currently improving. Labs reviewed- Renal function/electrolytes with Estimated Creatinine Clearance: 43.6 mL/min (A) (based on SCr of 2.1 mg/dL (H)). according to latest data. Monitor urine output and serial BMP and adjust therapy as needed. Avoid nephrotoxins and renally dose meds for GFR listed above.     creatinine elevated 3.5 on admission, downtrending with IV fluid administration   Nephrology consulted and have signed off   IV fluid discontinued given history of CHF noted on care everywhere and patient is able to tolerate oral intake

## 2023-05-05 NOTE — SUBJECTIVE & OBJECTIVE
Interval History: Large middle back abscess continues to drain purulence. Afebrile. Leukocytosis downtrend.     Medications:  Continuous Infusions:  Scheduled Meds:   clindamycin (CLEOCIN) IVPB  600 mg Intravenous Q8H    heparin (porcine)  5,000 Units Subcutaneous Q12H    piperacillin-tazobactam (ZOSYN) IVPB  4.5 g Intravenous Q8H     PRN Meds:acetaminophen, dextrose 10%, dextrose 10%, hydrALAZINE, ondansetron, sodium chloride 0.9%     Review of patient's allergies indicates:  No Known Allergies  Objective:     Vital Signs (Most Recent):  Temp: 98.1 °F (36.7 °C) (05/05/23 0737)  Pulse: 79 (05/05/23 0737)  Resp: 20 (05/05/23 0737)  BP: (!) 179/81 (05/05/23 0737)  SpO2: (!) 92 % (05/05/23 0737) Vital Signs (24h Range):  Temp:  [97.1 °F (36.2 °C)-99.1 °F (37.3 °C)] 98.1 °F (36.7 °C)  Pulse:  [79-93] 79  Resp:  [18-28] 20  SpO2:  [92 %-100 %] 92 %  BP: (134-210)/(81-99) 179/81     Weight: 126.1 kg (278 lb)  Body mass index is 47.72 kg/m².    Intake/Output - Last 3 Shifts         05/03 0700  05/04 0659 05/04 0700  05/05 0659 05/05 0700  05/06 0659    I.V. (mL/kg)  900.9 (7.1)     IV Piggyback 1100 175.3     Total Intake(mL/kg) 1100 (8.7) 1076.2 (8.5)     Urine (mL/kg/hr)  800 (0.3) 500 (1.4)    Total Output  800 500    Net +1100 +276.2 -500                    Physical Exam  Constitutional:       Appearance: She is well-developed. She is obese.   HENT:      Head: Normocephalic and atraumatic.   Eyes:      Conjunctiva/sclera: Conjunctivae normal.   Neck:      Thyroid: No thyromegaly.   Cardiovascular:      Rate and Rhythm: Normal rate.   Pulmonary:      Effort: Pulmonary effort is normal. No respiratory distress.   Abdominal:      General: There is no distension.      Palpations: Abdomen is soft. There is no mass.      Tenderness: There is no abdominal tenderness.   Skin:     Comments: Midline T-spine with actively purulent draining area of fluctuance and induration consistent with abscess. Probed with cotton tipped  applicator and abscess cavity is at least 5cm deep and measures around 8cm x 9cm.    Neurological:      Mental Status: She is alert.        Significant Labs:  I have reviewed all pertinent lab results within the past 24 hours.  CBC:   Recent Labs   Lab 05/05/23 0433   WBC 15.21*   RBC 3.60*   HGB 7.8*   HCT 23.9*   *   MCV 66*   MCH 21.7*   MCHC 32.6     BMP:   Recent Labs   Lab 05/05/23 0433   *   *   K 3.6   *   CO2 22*   BUN 60*   CREATININE 2.1*   CALCIUM 8.9   MG 2.6       Significant Diagnostics:  I have reviewed all pertinent imaging results/findings within the past 24 hours.

## 2023-05-05 NOTE — PT/OT/SLP PROGRESS
Speech Language Pathology Treatment    Patient Name:  Kenia Roper   MRN:  [id number]  Admitting Diagnosis: Acute metabolic encephalopathy    Recommendations:                 General Recommendations:  Dysphagia therapy and Cognitive-linguistic therapy  Diet recommendations:  Soft & Bite Sized Diet - IDDSI Level 6, Liquid Diet Level: Thin liquids - IDDSI Level 0   Aspiration Precautions: Feed only when awake/alert, Frequent oral care, HOB to 90 degrees, Remain upright 30 minutes post meal, Small bites/sips, and Standard aspiration precautions   General Precautions: Standard, aspiration  Communication strategies:  provide increased time to answer and cognitive-communicative impairment    Subjective     Pt seen bedside for ST.  PT assisted with mobility and positioning for PO intake.  No c/o pain.  No family present.  More alert and communicative today.  Cognitive deficits present.  Patient goals: To improve strength, memory    Pain/Comfort:  Pain Rating 1: 0/10  Pain Rating Post-Intervention 1: 0/10  Pain Rating 2: 0/10  Pain Rating Post-Intervention 2: 0/10    Respiratory Status: Room air    Objective:     Has the patient been evaluated by SLP for swallowing?   Yes  Keep patient NPO? No   Current Respiratory Status: room air     Swallowing re-assessed since pt's mentation improved.  Pt consumed thin liquids, pureed solids and regular solids during bedside CSE.  Reduced oral efficiency with regular solids.  Mild buccal residue, reduced with thin liquid rinse.    [place] Swallow Protocol:  [place] Swallow Protocol dictates patient remain NPO if fail screener (Suiter et al. 2014).  The [place] Swallow Protocol was administered. The patient was alert and provided the instructions prior to the beginning of the protocol. The patient consumed 3 oz before putting the cup down. Patient drank with consecutive swallows.  Patient without overt signs or symptoms of penetration/aspiration.     Thickened liquids were not used in this  assessment. Thickened liquids are associated with risks including dehydration, increased pharyngeal residue, potential interference with medication absorption, and decreased quality of life (Hebert, 2013). Thickened liquids are also more likely to be silently aspirated than thin liquids (Matt et al., 2018). Mann (2018) reported that thickened liquids have no sound evidence as reducing risk of pneumonia in patients with dysphagia and can cause harm by increasing risk of dehydration. It also presents an increased risk of UTI, electrolyte imbalance, constipation, fecal impaction, cognitive impairment, functional decline, and even death (Langmore, 2002; Fern, 2016).  This supports the assertion that we should confirm a patient requires thickened liquids with an instrumental swallow study prior to recommending them.      References:   Hebert VALVERDE (2013). Thickening agents used for dysphagia management: Effect on bioavailability of water, medication and feelings of satiety. Nutrition Journal, 12, 54. https://doi.org/10.1186/2014-3139-34-54    KIMBERLY Gutierrez., ANDRES Daly, FABIOLA Hunt, & NICOLLE Shields (2018). Cough response to aspiration in thin and thick fluids during FEES in hospitalized inpatients. International journal of language & communication disorders, 53(5), 909-918. https://doi.org/10.1111/6796-1868.25220    Pt's expressive and receptive language improving since admission.  Pt able to engage in functional conversation; however, memory severely impaired.  Oriented to person and place.  Not time or situation/event.  Impaired problem solving/reasoning.    Social hx:  Lives at home daughter and grandson.  [facility] previous [facility]. Although pt states her mother also lives at home, DCFS confirmed pt's mother is .  Fairly independent but receives assist by daughter at home as well.  Able to cook, order online groceries, administer own medications and watches her grandson at times.   Ambulates with walker but stays inside, watching TV most of the day.    Assessment:     Kenia Roper is a 47 y.o. female with an SLP diagnosis of suspected Dysphagia and Cognitive-communicative Impairment in the setting of AMS with dx acute bilateral embolic infarcts (See MRI 5/4/23).  She presents with improved EDMUND  and recommended for IDDSI 6 (soft/bite sized solids) with IDDSI 0 (thin liquids), following aspiration precautions and set-up/meal assist.  Pt recommended for IP rehab post-acute for cognitive-communicative intervention.    Goals:   Multidisciplinary Problems       SLP Goals          Problem: SLP    Goal Priority Disciplines Outcome   SLP Goal     SLP Ongoing, Progressing   Description: 1.  Pt will consume the least restrictive PO diet with ongoing bedside assessment as mentation improves. MET 5/5/23--IDDSI 6/IDDSI 0 recommended.  1a. Pt will consume IDDSI 6/IDDSI 0 without incident.  1b. Pt will improve efficiency for diet advancement to regular solids (IDDSI 7).  2.  Pt will improve cognitive-linguistic skills to meet basic/functional communicative needs related to orientation, memory recall and judgment/reasoning.                       Plan:     Patient to be seen:  2 x/week, 3 x/week   Plan of Care expires:  05/11/23  Plan of Care reviewed with:  patient   SLP Follow-Up:  Yes       Discharge recommendations:  rehabilitation facility   Barriers to Discharge:  None    Time Tracking:     SLP Treatment Date:   05/05/23  Speech Start Time:  1100  Speech Stop Time:  1130     Speech Total Time (min):  30 min    Billable Minutes: Speech Therapy Individual 15 minutes and Treatment Swallowing Dysfunction 15 minutes    05/05/2023

## 2023-05-05 NOTE — CONSULTS
"[facility] - Tele[facility] ([facility])  Neurosurgery  Consult Note    Inpatient consult to Neurosurgery  Consult performed by: Sierra Power PA-C  Consult ordered by: Warren Ramachandran PA-C      Subjective:     Chief Complaint/Reason for Admission:     History of Present Illness:   Pt states she does not remember falling at home. "Everything is blurry"  Her responses were delayed when answering my questions and she seemed to have trouble finding the words to respond. A few times she closed her eyes and seemed to nod off but she denied feeling tired.  She denies any weakness in her legs but states she was having some weakness in her arms before the incident due to past CVA.   She denies any numbness or tingling in upper or lower extremities.   She denies headaches, dizziness, SOB, CP or any other symptoms at this time.   She is able to use the bathroom indepdently at home but purewick was placed today. Her purewick appeared to have feces in it but she stated  she did not think she was  having a bowel movement.   She states she didn't even know there was an abscess on her back prior to being hospitalized.   The nurse was at bedside she states patient is doing way better overall than she was doing yesterday, she seems more alert and oriented today. Also spoke to hospital medicine who stated she was not very responsive yesterday.  She lives with family.      Admission HPI:  58yo F who is admitted to the hospital medicine service for evaluation of altered mental status, CVA and being found unresponsive at home.  Patient was reportedly found down at home covered in feces and urine with her last known well check around 2 days before this.  Has a known history of CVA and type 2 DM.  Was found to be septic upon presentation with a leukocytosis of 20k and elevated creatinine at 3.5.  She is admitted to the hospital medical service for evaluation of altered mental status and neurologic changes along with workup of her sepsis.  She " was found to have a large middle back abscess in her upper back which was actively draining purulence upon admission.      No medications prior to admission.       Review of patient's allergies indicates:  No Known Allergies    Past Medical History:   Diagnosis Date    Diabetes mellitus     Ovarian cancer     Stroke      History reviewed. No pertinent surgical history.  Family History    None       Tobacco Use    Smoking status: Unknown    Smokeless tobacco: Not on file   Substance and Sexual Activity    Alcohol use: Not on file    Drug use: Not on file    Sexual activity: Not on file     Review of Systems  Objective:     Weight: 126.1 kg (278 lb)  Body mass index is 47.72 kg/m².  Vital Signs (Most Recent):  Temp: 96.7 °F (35.9 °C) (05/05/23 1116)  Pulse: 79 (05/05/23 1320)  Resp: 18 (05/05/23 1116)  BP: (!) 198/93 (05/05/23 1116)  SpO2: 99 % (05/05/23 1116)   Vital Signs (24h Range):  Temp:  [96.7 °F (35.9 °C)-99.1 °F (37.3 °C)] 96.7 °F (35.9 °C)  Pulse:  [71-93] 79  Resp:  [18-20] 18  SpO2:  [92 %-100 %] 99 %  BP: (134-210)/(81-99) 198/93     Date 05/05/23 0700 - 05/06/23 0659   Shift 1216-2832 7141-4539 0963-9715 24 Hour Total   INTAKE   P.O. 240   240   Shift Total(mL/kg) 240(1.9)   240(1.9)   OUTPUT   Urine(mL/kg/hr) 500(0.5)   500   Shift Total(mL/kg) 500(4)   500(4)   Weight (kg) 126.1 126.1 126.1 126.1                       Female External Urinary Catheter 05/04/23 1630 (Active)   Skin no redness;no breakdown 05/05/23 0724   Tolerance no signs/symptoms of discomfort 05/05/23 0724   Suction Continuous suction at 70 mmHg 05/05/23 0724       Neurosurgery Physical Exam  General: well developed, obese, no distress.   Head: normocephalic, atraumatic  Neck: No tracheal deviation. No palpable masses. Full ROM.   Neurologic: Alert and oriented. Thought content appropriate.  Mental Status: Awake, Alert, Oriented to time place and self  Cranial nerves: face symmetric, tongue midline, CN II-XII grossly intact.   Eyes:  pupils equal, round, reactive to light with accomodation, EOMI.   Ears: No drainage.   Back: drained abscess visualized does not appear to be draining.  Pulmonary: normal respirations, no signs of respiratory distress  Sensory: intact to light touch throughout  Motor Strength: Moves all extremities spontaneously with good tone.  good strength upper and lower extremities. No abnormal movements seen.   Decreased  strength    Strength  Deltoids Triceps Biceps   Hand    Upper: R 5/5 5/5 5/5   4/5    L 5/5 5/5 5/5   4/5     Iliopsoas Quadriceps Knee  Flexion Tibialis  anterior Gastro- cnemius    Lower: R 4/5 4/5 5/5 4+/5 5/5     L 4/5 4/5 5/5 4+/5 5/5        Significant Labs:  Recent Labs   Lab 05/04/23 0427 05/04/23  1448 05/05/23  0433   * 176* 150*    146* 149*   K 3.9 3.8 3.6    108 113*   CO2 21* 21* 22*   BUN 61* 65* 60*   CREATININE 3.1* 2.7* 2.1*   CALCIUM 8.4* 8.7 8.9   MG 2.6  --  2.6     Recent Labs   Lab 05/03/23  2301 05/04/23  0427 05/05/23  0433   WBC 20.65* 20.45* 15.21*   HGB 9.3* 8.5* 7.8*   HCT 28.4* 25.3* 23.9*   * 489* 520*     Recent Labs   Lab 05/05/23  0433   INR 1.2   APTT 27.0     Microbiology Results (last 7 days)       Procedure Component Value Units Date/Time    Blood culture x two cultures. Draw prior to antibiotics. [[id number]] Collected: 05/03/23 2306    Order Status: Completed Specimen: Blood from Peripheral, Antecubital, Left Updated: 05/05/23 1212     Blood Culture, Routine No Growth to date      No Growth to date    Narrative:      Aerobic and anaerobic    Blood culture x two cultures. Draw prior to antibiotics. [[id number]] Collected: 05/03/23 2245    Order Status: Completed Specimen: Blood from Peripheral, Antecubital, Right Updated: 05/05/23 1212     Blood Culture, Routine No Growth to date      No Growth to date    Narrative:      Aerobic and anaerobic    Urine culture [[id number]] Collected: 05/04/23 0105    Order Status: No result Specimen: Urine  Updated: 05/04/23 0129          CMP:   Recent Labs   Lab 05/03/23  2301 05/04/23  0427 05/04/23  1448 05/05/23  0433   * 172* 176* 150*   CALCIUM 9.5 8.4* 8.7 8.9   ALBUMIN 2.3* 2.1*  --  2.1*   PROT 8.6* 7.7  --  7.4    144 146* 149*   K 4.6 3.9 3.8 3.6   CO2 21* 21* 21* 22*    106 108 113*   BUN 60* 61* 65* 60*   CREATININE 3.5* 3.1* 2.7* 2.1*   ALKPHOS 172* 155*  --  141*   * 151*  --  102*   * 350*  --  110*   BILITOT 1.3* 1.3*  --  1.0     CRP: No results for input(s): CRP in the last 48 hours.    Blood cultures showing no growth to date    Significant Diagnostics:  MRI BRAIN WITHOUT CONTRAST     CLINICAL HISTORY:  Mental status change, unknown cause;.     TECHNIQUE:  Multiplanar multisequence MR imaging of the brain was performed without contrast.     COMPARISON:  Multiple priors.     FINDINGS:  Intracranial compartment:     Motion severely degrades the exam.     Ventricles and sulci are normal in size for age without evidence of hydrocephalus. No extra-axial blood or fluid collections.     Multiple small scattered acute infarcts some cortical and others within the white matter.  These favor embolic type infarcts.  Infarcts are noted in the bilateral frontal, and parietal lobes and basal ganglia.  Suspected bilateral occipital infarcts.  No mass lesion, acute hemorrhage, edema or acute infarct.     Normal vascular flow voids are preserved.     Skull/extracranial contents (limited evaluation): Bone marrow signal intensity is normal.     Impression:     Bilateral small scattered acute infarcts favoring embolic type infarcts.       EXAMINATION:  CT THORACIC SPINE WITHOUT CONTRAST     CLINICAL HISTORY:  back abscess concern for osteomyelitis;     Serial axial images were obtained of the thoracic spine without the administration of intravenous contrast. Both soft tissue and bone windows were obtained.  Iterative technique automated exposure control     COMPARISON:  None available      Impression:     There is a subcutaneous unorganized air-fluid collection dorsally consistent with clinical abscess or gas producing infection.  No overt underlying associated bony change.  No overt involvement of the spinal canal as visualized unenhanced.     No findings highly concerning for osteomyelitis    Assessment/Plan:     Active Diagnoses:    Diagnosis Date Noted POA    PRINCIPAL PROBLEM:  Acute metabolic encephalopathy [G93.41] 05/04/2023 Yes    Morbid obesity with BMI of 45.0-49.9, adult [E66.01, Z68.42] 05/04/2023 Not Applicable    Severe sepsis [A41.9, R65.20] 05/04/2023 Yes    LUISA (acute kidney injury) [N17.9] 05/04/2023 Yes    Elevated troponin [R77.8] 05/04/2023 Yes    Type 2 diabetes mellitus with hyperglycemia [E11.65] 05/04/2023 Yes    Elevated LFTs [R79.89] 05/04/2023 Yes    High anion gap metabolic acidosis [E87.29] 05/04/2023 Yes    Back abscess [L02.212] 05/04/2023 Yes      Problems Resolved During this Admission:         Thank you for your consult. I will follow-up with patient. Please contact us if you have any additional questions.    Sierra Power PA-C  Neurosurgery  [facility] - Tele[facility] ([facility])

## 2023-05-05 NOTE — PLAN OF CARE
[facility] - Tele[facility] (Hospital)  Initial Discharge Assessment       Primary Care Provider: Ross Perdomo MD    Admission Diagnosis: Altered mental status [R41.82]  Transaminitis [R74.01]  Elevated troponin [R77.8]  NSTEMI (non-ST elevated myocardial infarction) [I21.4]  LUISA (acute kidney injury) [N17.9]  Severe sepsis [A41.9, R65.20]  Sepsis, due to unspecified organism, unspecified whether acute organ dysfunction present [A41.9]    Admission Date: 5/3/2023  Expected Discharge Date:     Discharge Barriers Identified: Mental illness, Social, No family/friends to help    Payor: MEDICAID / Plan: [facility]) / Product Type: Managed Medicaid /     Extended Emergency Contact Information  Primary Emergency Contact: KelsyLuli  Mobile Phone: [phone]  Relation: DCFS   Secondary Emergency Contact: Yuliya Roper  Mobile Phone: [phone]  Relation: Daughter    Discharge Plan A: Rehab  Discharge Plan B: Home    No Pharmacies Listed    Initial Assessment (most recent)       Adult Discharge Assessment - 05/05/23 1553          Discharge Assessment    Assessment Type Discharge Planning Assessment     Confirmed/corrected address, phone number and insurance Yes     Confirmed Demographics Correct on Facesheet     Source of Information other (see comments);patient   DCFS     Communicated CRISPIN with patient/caregiver Date not available/Unable to determine     Reason For Admission Acute metabolic encephalopathy     People in Home child(gwendolyn), adult;grandchild(gwendolyn)     Facility Arrived From: home     Do you expect to return to your current living situation? Yes     Do you have help at home or someone to help you manage your care at home? Yes     Who are your caregiver(s) and their phone number(s)? daughter     Equipment Currently Used at Home none     Readmission within 30 days? No     Patient currently being followed by outpatient case management? No     Do you currently have  service(s) that help you manage your care at home? No     Do you take prescription medications? Yes     Do you have prescription coverage? Yes     Coverage [facility]     Do you have any problems affording any of your prescribed medications? No     Is the patient taking medications as prescribed? yes     How do you get to doctors appointments? agency     Are you on dialysis? No     Do you take coumadin? No     Discharge Plan A Rehab     Discharge Plan B Home     DME Needed Upon Discharge  none     Discharge Plan discussed with: Patient   DCFS  also assisted    Discharge Barriers Identified Mental illness;Social;No family/friends to help                   Anticipated DC dispo: home  Prior Level of Function:   PCP:     Comments:  SW met with patient and DCFS  at bedside to introduce role and discuss discharge planning. Patient lives with daughter and 6 year old grandson who will also be help at home and can provide transport at time of discharge. CM discharge needs depends on hospital progress. SW will continue following to assist with other needs.

## 2023-05-05 NOTE — ASSESSMENT & PLAN NOTE
Patient noted to have multiple areas of infarction on both hemispheres concerning for embolic phenomena  Carotid ultrasound showed no significant stenosis     Antithrombotics for secondary stroke prevention: Antiplatelets: Aspirin: 81 mg daily  Clopidogrel: 75 mg daily    Statins for secondary stroke prevention and hyperlipidemia, if present:   Statins: Atorvastatin- 40 mg daily    Aggressive risk factor modification: HTN, DM, Obesity     Rehab efforts: The patient has been evaluated by a stroke team provider and the therapy needs have been fully considered based off the presenting complaints and exam findings. The following therapy evaluations are needed: PT evaluate and treat, OT evaluate and treat, SLP evaluate and treat    Diagnostics ordered/pending: MRA head to assess vasculature, TTE to assess cardiac function/status , TSH to assess thyroid function    VTE prophylaxis: Enoxaparin 40 mg SQ every 24 hours    BP parameters: Infarct: No intervention, SBP <220    PT/OT recommended rehab placement on discharge   SLP recommends dysphagia soft diet

## 2023-05-05 NOTE — ASSESSMENT & PLAN NOTE
Patient found to have drainage of purulence material from her back  CT of thoracic spine confirms abscess, MRIs pending   General surgery consulted, recommended neurosurgery evaluation

## 2023-05-05 NOTE — ASSESSMENT & PLAN NOTE
Troponin elevated 0.25 on admission, in the setting of elevated creatinine 3.5.  Repeat troponin remained flat  EKG NSR with no ST or T-wave changes.    Likely demand ischemia in setting of infectious process and acute embolic stroke  Although patient initially encephalopathic, now that mentation has improved denies any chest pain

## 2023-05-05 NOTE — SUBJECTIVE & OBJECTIVE
Interval History: No acute events overnight per nursing staff.  Patient awake, alert, oriented x3 and talkative today.  Per family report, patient is able to participate with ADLs including babysitting her grandchild. Seen and examined without any family present.  She denies any complaints, does not recall what happened prior to being brought to the hospital.  However she was able to relay her medical history which included stroke a couple years ago for which she was admitted to [facility].  Admits that she has not been on any antiplatelets such as aspirin or Plavix since then.  She is unable to recall the name of her PCP and unable to recall any home meds.  PT/OT recommended rehab placement.  SLP recommended dysphagia diet with thin liquids      Review of Systems  Objective:     Vital Signs (Most Recent):  Temp: 98.3 °F (36.8 °C) (05/05/23 1616)  Pulse: 79 (05/05/23 1740)  Resp: 18 (05/05/23 1616)  BP: (!) 197/86 (05/05/23 1616)  SpO2: 100 % (05/05/23 1616)   Vital Signs (24h Range):  Temp:  [96.7 °F (35.9 °C)-99.1 °F (37.3 °C)] 98.3 °F (36.8 °C)  Pulse:  [71-93] 79  Resp:  [18-20] 18  SpO2:  [92 %-100 %] 100 %  BP: (179-198)/(81-93) 197/86     Weight: 126.1 kg (278 lb)  Body mass index is 47.72 kg/m².    Intake/Output Summary (Last 24 hours) at 5/5/2023 1757  Last data filed at 5/5/2023 1756  Gross per 24 hour   Intake 1760.58 ml   Output 1300 ml   Net 460.58 ml         Physical Exam  Vitals and nursing note reviewed.   Constitutional:       Appearance: She is morbidly obese.   HENT:      Head: Normocephalic and atraumatic.      Right Ear: External ear normal.      Left Ear: External ear normal.      Mouth/Throat:      Mouth: Mucous membranes are moist.   Eyes:      Pupils: Pupils are equal, round, and reactive to light.   Cardiovascular:      Rate and Rhythm: Normal rate and regular rhythm.   Pulmonary:      Effort: Pulmonary effort is normal. No accessory muscle usage or respiratory distress.       Breath sounds: No wheezing.      Comments: On room air  Abdominal:      General: There is no distension.      Palpations: Abdomen is soft.      Tenderness: There is no abdominal tenderness. There is no guarding or rebound.   Musculoskeletal:      Cervical back: Neck supple.      Right lower leg: No edema.      Left lower leg: No edema.   Skin:     General: Skin is warm and dry.   Neurological:      Mental Status: She is alert and oriented to person, place, and time.      Cranial Nerves: No dysarthria.      Motor: Weakness present.      Coordination: Finger-Nose-Finger Test abnormal.      Comments: 1/5 bilateral lower extremity strength.  Difficulty with some word findings           Significant Labs: All pertinent labs within the past 24 hours have been reviewed.  A1C:   Recent Labs   Lab 05/04/23 0427   HGBA1C 7.1*     CBC:   Recent Labs   Lab 05/03/23 2301 05/04/23 0427 05/05/23  0433   WBC 20.65* 20.45* 15.21*   HGB 9.3* 8.5* 7.8*   HCT 28.4* 25.3* 23.9*   * 489* 520*     CMP:   Recent Labs   Lab 05/03/23  2301 05/04/23 0427 05/04/23  1448 05/05/23  0433    144 146* 149*   K 4.6 3.9 3.8 3.6    106 108 113*   CO2 21* 21* 21* 22*   * 172* 176* 150*   BUN 60* 61* 65* 60*   CREATININE 3.5* 3.1* 2.7* 2.1*   CALCIUM 9.5 8.4* 8.7 8.9   PROT 8.6* 7.7  --  7.4   ALBUMIN 2.3* 2.1*  --  2.1*   BILITOT 1.3* 1.3*  --  1.0   ALKPHOS 172* 155*  --  141*   * 350*  --  110*   * 151*  --  102*   ANIONGAP 20* 17* 17* 14     Lipid Panel:   Recent Labs   Lab 05/04/23 0427   CHOL 142   HDL 6*   LDLCALC 80.8   TRIG 276*   CHOLHDL 4.2*     Urine Studies:   Recent Labs   Lab 05/04/23  0105   COLORU Yellow   APPEARANCEUA Hazy*   PHUR 5.0   SPECGRAV 1.020   PROTEINUA 1+*   GLUCUA Negative   KETONESU Negative   BILIRUBINUA Negative   OCCULTUA 2+*   NITRITE Negative   UROBILINOGEN 4.0-6.0*   LEUKOCYTESUR Trace*   RBCUA 1   WBCUA 14*   BACTERIA Rare   SQUAMEPITHEL 18   HYALINECASTS 3*        Significant Imaging: I have reviewed all pertinent imaging results/findings within the past 24 hours.

## 2023-05-05 NOTE — PT/OT/SLP PROGRESS
Physical Therapy  Treatment    Kenia Roper   MRN: [id number]   Admitting Diagnosis: Acute metabolic encephalopathy    PT Received On: 05/04/23  PT Start Time: 1015     PT Stop Time: 1055    PT Total Time (min): 40 min       Billable Minutes:  Gait Training 15 and Therapeutic Activity 25    Treatment Type: Treatment  PT/PTA: PT     Number of PTA visits since last PT visit: 0       General Precautions: Standard, fall  Orthopedic Precautions: N/A  Braces: N/A  Respiratory Status: Room air    Spiritual, Cultural Beliefs, Jew Practices, Values that Affect Care: no    Subjective:  Communicated with nursing (lorin) and performed chart review via epic system prior to session.  Pt agreeable to PT services. Pt more alert and able to follow commands but lingering confusion decreases pt's ability to answer questions/provide details consistently. Pt's daughter called during session. Daughter provided subjective hx. Pt primarily ambulatory with RW in household. Pt lives with daughter and grandson (7yo). Per daughter that pt is able to participate in daily tasks without difficulty. Daughter also noted hx of falls in the home.    Pain/Comfort  Pain Rating 1: 0/10  Pain Rating Post-Intervention 1: 0/10    Objective:   Patient found with: peripheral IV, telemetry    Functional Mobility:  Bed Mobility:    Supine to sit with max A and cues for sequencing and hand placement, sit>supine with max A, rolling with mod A  Pt able to tolerate sitting EOB x 17-20 mins with no LOB. BP in sitting 192/94, nsg notified and able to check on pt at bedside.     Transfers:   Sit<>stand x 2 reps throughout session with max A and RW    Gait:    3ft max A with RW, cues for weight shifting, sequencing and tactile assist to mobilize RLE    Balance:   Static Sit: FAIR+: Able to take MINIMAL challenges from all directions  Dynamic Sit: FAIR+: Maintains balance through MINIMAL excursions of active trunk motion  Static Stand: POOR: Needs MODERATE assist  to maintain  Dynamic stand: poor minus: needs max A to maintain       Treatment and Education:  Educated pt on benefits of consistent participation in PT services to meet functional goals. Educated pt on seated therex to promote strength and joint mobility. Exercises included AP, LAQ. Educated to perform exercises intermittently throughout day to tolerance. Educated pt on importance of sitting OOB to promote endurance and overall activity tolerance. Educated pt on call don't fall policy and use of call button to alert nursing staff of needs. Pt expressed understanding.       AM-PAC 6 CLICK MOBILITY  How much help from another person does this patient currently need?   1 = Unable, Total/Dependent Assistance  2 = A lot, Maximum/Moderate Assistance  3 = A little, Minimum/Contact Guard/Supervision  4 = None, Modified [place]/Independent    Turning over in bed (including adjusting bedclothes, sheets and blankets)?: 2  Sitting down on and standing up from a chair with arms (e.g., wheelchair, bedside commode, etc.): 2  Moving from lying on back to sitting on the side of the bed?: 2  Moving to and from a bed to a chair (including a wheelchair)?: 2  Need to walk in hospital room?: 2  Climbing 3-5 steps with a railing?: 1  Basic Mobility Total Score: 11    AM-PAC Raw Score CMS G-Code Modifier Level of Impairment Assistance   6 % Total / Unable   7 - 9 CM 80 - 100% Maximal Assist   10 - 14 CL 60 - 80% Moderate Assist   15 - 19 CK 40 - 60% Moderate Assist   20 - 22 CJ 20 - 40% Minimal Assist   23 CI 1-20% SBA / CGA   24 CH 0% Independent/ Mod I     Patient left supine with all lines intact, call button in reach, and nursing notified.    Assessment:  Kenia Roper is a 47 y.o. female with a medical diagnosis of Acute metabolic encephalopathy and presents with the impairments listed below. Pt better able to perform functional tasks. Demonstrated mixed deficits and continued confusion which negatively impacts  functional mobility. Pt will benefit from continued PT services.    Rehab identified problem list/impairments: weakness, impaired endurance, impaired functional mobility, gait instability, impaired cognition, decreased coordination, decreased safety awareness, decreased lower extremity function    Rehab potential is good.    Activity tolerance: Good and Fair    Discharge recommendations: rehabilitation facility      Barriers to discharge:  unclear level of assist at home    Equipment recommendations:  (TBD)     GOALS:   Multidisciplinary Problems       Physical Therapy Goals          Problem: Physical Therapy    Goal Priority Disciplines Outcome Goal Variances Interventions   Physical Therapy Goal     PT, PT/OT Ongoing, Progressing     Description: Pt will perform bed mobility SBA in order to participate in EOB activity.  Pt will perform transfers CGA in order to participate in OOB activity.   Pt will ambulate 50ft CGA with LRAD in order to participate in daily tasks.                         PLAN:    Patient to be seen 3 x/week to address the above listed problems via neuromuscular re-education, therapeutic exercises, therapeutic activities, gait training  Plan of Care expires: 05/18/23  Plan of Care reviewed with: patient, daughter         05/05/2023

## 2023-05-05 NOTE — CARE UPDATE
Consult received   Patient with sepsis multi organ dysfunction   Needs CT chest abdomin pelvis to look for source of infection   MRI T spine to assess if this fluid tracks to bone or not  Patient on empiric abx   Stroke with infarction   CT shows dorsal gas in sub Q tissues    Formal consult to follow

## 2023-05-05 NOTE — PROGRESS NOTES
[facility] Medicine  Progress Note    Patient Name: Kenia Roper  MRN: [id number]  Patient Class: IP- Inpatient   Admission Date: 5/3/2023  Length of Stay: 1 days  Attending Physician: Heydi Cardozo DO  Primary Care Provider: Ross Perdomo MD        Subjective:     Principal Problem:Acute metabolic encephalopathy        HPI:  Ms. Roper is a 57-year-old morbidly obese  female, was brought in to the ED in an unresponsive state.  No family at the bedside, unable to obtain any information from patient.  No information per Care everywhere.  Most of the information obtained from ED staff.  Per report, patient was found down at home, covered in feces and urine.  Last known well two days ago.  No report of fever or chills.  Known history of CVA and type 2 diabetes mellitus.  In the ED she is afebrile, mildly tachycardic HR .  WBC elevated 20,000. BUN 60, creatinine 3.5 (unknown baseline).  Elevated LFTs in the 400s.  Troponin elevated 0.259, unknown if she has chest pain or SOB.  EKG NSR with no ST T wave changes.  Hemodynamically stable.  Presumed severe sepsis with LUISA/AMS, she received 30 mL/kilogram bolus in the ED along with IV Zosyn empirically.  Cultures obtained.  UDS positive for opiates.  CT head is negative for acute intracranial pathology.  CXR negative for infectious process.    Admitting diagnosis:  Severe sepsis.  Acute encephalopathy (TIA versus uremia).  LUISA.  Elevated troponin.  Elevated LFTs.      Overview/Hospital Course:  MRI brain revealed bilateral small scattered acute infarcts favoring embolic type infarcts.  Neurology consulted    Upon further examination, patient was found to have a large middle back abscess with purulent drainage.  CT of thoracic spine done without contrast showed subcutaneous on organized air-fluid collection dorsally consistent with clinical abscess or gas producing infection with no overt underlying bony change or  involvement of the spinal canal.  Patient started on empiric antibiotics with Zosyn and clindamycin.  General surgery consulted and recommended MRI of thoracic spine and Neurosurgery consultation    Mentation improved, patient awake, alert, oriented although notable to have lower extremity weakness and difficulty with word finding.  Neurology consulted and recommended MRA brain  .        Interval History: No acute events overnight per nursing staff.  Patient awake, alert, oriented x3 and talkative today.  Per family report, patient is able to participate with ADLs including babysitting her grandchild. Seen and examined without any family present.  She denies any complaints, does not recall what happened prior to being brought to the hospital.  However she was able to relay her medical history which included stroke a couple years ago for which she was admitted to [facility].  Admits that she has not been on any antiplatelets such as aspirin or Plavix since then.  She is unable to recall the name of her PCP and unable to recall any home meds.  PT/OT recommended rehab placement.  SLP recommended dysphagia diet with thin liquids      Review of Systems  Objective:     Vital Signs (Most Recent):  Temp: 98.3 °F (36.8 °C) (05/05/23 1616)  Pulse: 79 (05/05/23 1740)  Resp: 18 (05/05/23 1616)  BP: (!) 197/86 (05/05/23 1616)  SpO2: 100 % (05/05/23 1616)   Vital Signs (24h Range):  Temp:  [96.7 °F (35.9 °C)-99.1 °F (37.3 °C)] 98.3 °F (36.8 °C)  Pulse:  [71-93] 79  Resp:  [18-20] 18  SpO2:  [92 %-100 %] 100 %  BP: (179-198)/(81-93) 197/86     Weight: 126.1 kg (278 lb)  Body mass index is 47.72 kg/m².    Intake/Output Summary (Last 24 hours) at 5/5/2023 1757  Last data filed at 5/5/2023 1756  Gross per 24 hour   Intake 1760.58 ml   Output 1300 ml   Net 460.58 ml         Physical Exam  Vitals and nursing note reviewed.   Constitutional:       Appearance: She is morbidly obese.   HENT:      Head: Normocephalic and  atraumatic.      Right Ear: External ear normal.      Left Ear: External ear normal.      Mouth/Throat:      Mouth: Mucous membranes are moist.   Eyes:      Pupils: Pupils are equal, round, and reactive to light.   Cardiovascular:      Rate and Rhythm: Normal rate and regular rhythm.   Pulmonary:      Effort: Pulmonary effort is normal. No accessory muscle usage or respiratory distress.      Breath sounds: No wheezing.      Comments: On room air  Abdominal:      General: There is no distension.      Palpations: Abdomen is soft.      Tenderness: There is no abdominal tenderness. There is no guarding or rebound.   Musculoskeletal:      Cervical back: Neck supple.      Right lower leg: No edema.      Left lower leg: No edema.   Skin:     General: Skin is warm and dry.   Neurological:      Mental Status: She is alert and oriented to person, place, and time.      Cranial Nerves: No dysarthria.      Motor: Weakness present.      Coordination: Finger-Nose-Finger Test abnormal.      Comments: 1/5 bilateral lower extremity strength.  Difficulty with some word findings           Significant Labs: All pertinent labs within the past 24 hours have been reviewed.  A1C:   Recent Labs   Lab 05/04/23 0427   HGBA1C 7.1*     CBC:   Recent Labs   Lab 05/03/23  2301 05/04/23  0427 05/05/23  0433   WBC 20.65* 20.45* 15.21*   HGB 9.3* 8.5* 7.8*   HCT 28.4* 25.3* 23.9*   * 489* 520*     CMP:   Recent Labs   Lab 05/03/23  2301 05/04/23 0427 05/04/23  1448 05/05/23  0433    144 146* 149*   K 4.6 3.9 3.8 3.6    106 108 113*   CO2 21* 21* 21* 22*   * 172* 176* 150*   BUN 60* 61* 65* 60*   CREATININE 3.5* 3.1* 2.7* 2.1*   CALCIUM 9.5 8.4* 8.7 8.9   PROT 8.6* 7.7  --  7.4   ALBUMIN 2.3* 2.1*  --  2.1*   BILITOT 1.3* 1.3*  --  1.0   ALKPHOS 172* 155*  --  141*   * 350*  --  110*   * 151*  --  102*   ANIONGAP 20* 17* 17* 14     Lipid Panel:   Recent Labs   Lab 05/04/23  0427   CHOL 142   HDL 6*   LDLCALC  80.8   TRIG 276*   CHOLHDL 4.2*     Urine Studies:   Recent Labs   Lab 05/04/23  0105   COLORU Yellow   APPEARANCEUA Hazy*   PHUR 5.0   SPECGRAV 1.020   PROTEINUA 1+*   GLUCUA Negative   KETONESU Negative   BILIRUBINUA Negative   OCCULTUA 2+*   NITRITE Negative   UROBILINOGEN 4.0-6.0*   LEUKOCYTESUR Trace*   RBCUA 1   WBCUA 14*   BACTERIA Rare   SQUAMEPITHEL 18   HYALINECASTS 3*       Significant Imaging: I have reviewed all pertinent imaging results/findings within the past 24 hours.      Assessment/Plan:      * Acute metabolic encephalopathy  Patient found down at home, last well-known two days prior to ED presentation  History of CVA, lives at home with family and apparently able to participate in ADLs including babysitting her grandchild  CT head negative for acute intracranial pathology, however MRI brain revealed bilateral small acute infarcts consistent with embolic stroke  Also found to have back abscess on CT of thoracic spine  Mentation improved with initiation of IV antibiotics  MRA brain and MRI of thoracic spine pending  Neurology recs    Elevated CPK  Elevation likely secondary to muscle breakdown as patient was found down at home for unknown period of time  Has been treated with IV fluid   Repeat CPK in the morning      Cerebrovascular accident (CVA) due to embolism  Patient noted to have multiple areas of infarction on both hemispheres concerning for embolic phenomena  Carotid ultrasound showed no significant stenosis     Antithrombotics for secondary stroke prevention: Antiplatelets: Aspirin: 81 mg daily  Clopidogrel: 75 mg daily    Statins for secondary stroke prevention and hyperlipidemia, if present:   Statins: Atorvastatin- 40 mg daily    Aggressive risk factor modification: HTN, DM, Obesity     Rehab efforts: The patient has been evaluated by a stroke team provider and the therapy needs have been fully considered based off the presenting complaints and exam findings. The following therapy  evaluations are needed: PT evaluate and treat, OT evaluate and treat, SLP evaluate and treat    Diagnostics ordered/pending: MRA head to assess vasculature, TTE to assess cardiac function/status , TSH to assess thyroid function    VTE prophylaxis: Enoxaparin 40 mg SQ every 24 hours    BP parameters: Infarct: No intervention, SBP <220    PT/OT recommended rehab placement on discharge   SLP recommends dysphagia soft diet    Back abscess  Patient found to have drainage of purulence material from her back  CT of thoracic spine confirms abscess, MRIs pending   General surgery consulted, recommended neurosurgery evaluation      High anion gap metabolic acidosis  Resolved with IV fluids in setting of LUISA versus LUISA superimposed on CKD  Monitor      Elevated LFTs  , , .  Total bilirubin 1.3 on arrival  Acute hepatitis panel negative  Abdominal ultrasound shows no acute abnormality  LFTs downtrending   Continue to monitor      Type 2 diabetes mellitus with hyperglycemia  Blood sugar mildly elevated 166 on admission  Hemoglobin A1c 7.1    Accu-Cheks with SSI  Diabetic diet  Optimize diabetic regimen in setting of acute embolic stroke      Elevated troponin  Troponin elevated 0.25 on admission, in the setting of elevated creatinine 3.5.  Repeat troponin remained flat  EKG NSR with no ST or T-wave changes.    Likely demand ischemia in setting of infectious process and acute embolic stroke  Although patient initially encephalopathic, now that mentation has improved denies any chest pain    LUISA (acute kidney injury)  Patient with acute kidney injury likely due to IVVD/dehydration and acute tubular necrosis LUISA is currently improving. Labs reviewed- Renal function/electrolytes with Estimated Creatinine Clearance: 43.6 mL/min (A) (based on SCr of 2.1 mg/dL (H)). according to latest data. Monitor urine output and serial BMP and adjust therapy as needed. Avoid nephrotoxins and renally dose meds for GFR listed  above.     creatinine elevated 3.5 on admission, downtrending with IV fluid administration   Nephrology consulted and have signed off   IV fluid discontinued given history of CHF noted on care everywhere and patient is able to tolerate oral intake      Severe sepsis  This patient does have evidence of infective focus  My overall impression is sepsis.  Source: Urinary Tract and Skin and Soft Tissue (location Back)  Antibiotics given-   Antibiotics (72h ago, onward)    Start     Stop Route Frequency Ordered    05/04/23 0800  piperacillin-tazobactam (ZOSYN) 4.5 g in dextrose 5 % in water (D5W) 5 % 100 mL IVPB (MB+)         -- IV Every 8 hours (non-standard times) 05/04/23 0454    05/04/23 0600  clindamycin in D5W 600 mg/50 mL IVPB 600 mg         -- IV Every 8 hours (non-standard times) 05/04/23 0454        Latest lactate reviewed-  Recent Labs   Lab 05/03/23  2333   LACTATE 1.3     Organ dysfunction indicated by Acute kidney injury and Acute liver injury    Fluid challenge Ideal Body Weight- The patient's ideal body weight is Ideal body weight: 54.7 kg (120 lb 9.5 oz) which will be used to calculate fluid bolus of 30 ml/kg for treatment of septic shock.      Post- resuscitation assessment No - Post resuscitation assessment not needed       Will Not start Pressors- Levophed for MAP of 65  Source control achieved by:  IV antibiotics.      -afebrile.  HR .  WBC 68646, 0% bands, elevated procalcitonin on admission  -Leukocytosis downtrending   -possible multiple etiologies of infectious process secondary to UTI and back abscess  -blood cultures NGTD, urine culture in process, will follow up  -continue IV clindamycin, cefepime empirically, deescalate based on culture    Morbid obesity with BMI of 45.0-49.9, adult  Body mass index is 47.72 kg/m². Morbid obesity complicates all aspects of disease management from diagnostic modalities to treatment. Weight loss encouraged and health benefits explained to  patient.           VTE Risk Mitigation (From admission, onward)         Ordered     heparin (porcine) injection 5,000 Units  Every 12 hours         05/04/23 0456     IP VTE HIGH RISK PATIENT  Once         05/04/23 0456     Place sequential compression device  Until discontinued         05/04/23 0456     Place sequential compression device  Until discontinued         05/04/23 0043                Discharge Planning   CRISPIN:      Code Status: Full Code   Is the patient medically ready for discharge?:     Reason for patient still in hospital (select all that apply): Patient trending condition, Laboratory test, Treatment, Imaging and Consult recommendations  Discharge Plan A: Rehab                  Heydi aCrdozo DO  Department of Hospital Medicine   '[facility] ([facility])

## 2023-05-05 NOTE — CONSULTS
[facility] - Tele[facility] ([facility])  Neurology  Consult Note    Patient Name: Kenia Roper  MRN: [id number]  Admission Date: 5/3/2023  Hospital Length of Stay: 1 days  Code Status: Full Code   Attending Provider: Heydi Cardozo DO   Consulting Provider: Javi Morales MD  Primary Care Physician: Ross Perdomo MD  Principal Problem:Acute metabolic encephalopathy    Inpatient consult to Neurology  Consult performed by: Javi Morales MD  Consult ordered by: Luis Denis MD      Inpatient consult to Telemedicine-General Neurology  Consult performed by: Javi Morales MD  Consult ordered by: Chelsy Cole MD      Subjective:     Chief Complaint: Stroke     HPI: 57-year-old female with medical Hx of DM, stroke, ovarian cancer was brought to ED for AMS. Patient was found poorly responsive at home covered in feces and urine. Reportedly last know two days prior to admission. Pt is more alert but unable to tell me that the reason for her to be in the hospital. Her workup has been remarkable for WBC's of 20K, BUN 60, creatinine 3.5, LFTs in the 400's, troponin elevated 0.25. She was put on antibiotics due presumed sepsis, given IV fluids.     Past Medical History:   Diagnosis Date    Diabetes mellitus     Ovarian cancer     Stroke        History reviewed. No pertinent surgical history.    Review of patient's allergies indicates:  No Known Allergies    Current Neurological Medications:     No current facility-administered medications on file prior to encounter.     No current outpatient medications on file prior to encounter.      Family History    None       Tobacco Use    Smoking status: Unknown    Smokeless tobacco: Not on file   Substance and Sexual Activity    Alcohol use: Not on file    Drug use: Not on file    Sexual activity: Not on file     Review of Systems   Respiratory:  Negative for shortness of breath.    Gastrointestinal:  Negative for abdominal pain.   Genitourinary:  Negative for flank pain.   Neurological:   Negative for headaches.   Objective:     Vital Signs (Most Recent):  Temp: 98.1 °F (36.7 °C) (05/05/23 0737)  Pulse: 79 (05/05/23 0737)  Resp: 20 (05/05/23 0737)  BP: (!) 179/81 (05/05/23 0737)  SpO2: (!) 92 % (05/05/23 0737)   Vital Signs (24h Range):  Temp:  [97.1 °F (36.2 °C)-99.1 °F (37.3 °C)] 98.1 °F (36.7 °C)  Pulse:  [79-93] 79  Resp:  [18-28] 20  SpO2:  [92 %-100 %] 92 %  BP: (134-210)/(81-99) 179/81     Weight: 126.1 kg (278 lb)  Body mass index is 47.72 kg/m².    Physical Exam  Constitutional:       General: She is not in acute distress.  Pulmonary:      Effort: No respiratory distress.       NEUROLOGICAL EXAMINATION:     MENTAL STATUS   Level of consciousness: drowsy       Oriented to self. Knows she is in a hospital     CRANIAL NERVES     CN III, IV, VI   Conjugate gaze: present    CN VII   Right facial weakness: none  Left facial weakness: none    CN XII   Tongue deviation: none    MOTOR EXAM        AROM of UE's against gravity     Significant Labs: CBC:   Recent Labs   Lab 05/03/23 2301 05/04/23 0427 05/05/23  0433   WBC 20.65* 20.45* 15.21*   HGB 9.3* 8.5* 7.8*   HCT 28.4* 25.3* 23.9*   * 489* 520*     CMP:   Recent Labs   Lab 05/03/23 2301 05/04/23 0427 05/04/23  1448 05/05/23  0433   * 172* 176* 150*    144 146* 149*   K 4.6 3.9 3.8 3.6    106 108 113*   CO2 21* 21* 21* 22*   BUN 60* 61* 65* 60*   CREATININE 3.5* 3.1* 2.7* 2.1*   CALCIUM 9.5 8.4* 8.7 8.9   MG  --  2.6  --  2.6   PROT 8.6* 7.7  --  7.4   ALBUMIN 2.3* 2.1*  --  2.1*   BILITOT 1.3* 1.3*  --  1.0   ALKPHOS 172* 155*  --  141*   * 350*  --  110*   * 151*  --  102*   ANIONGAP 20* 17* 17* 14     Urine Studies:   Recent Labs   Lab 05/04/23  0105   COLORU Yellow   APPEARANCEUA Hazy*   PHUR 5.0   SPECGRAV 1.020   PROTEINUA 1+*   GLUCUA Negative   KETONESU Negative   BILIRUBINUA Negative   OCCULTUA 2+*   NITRITE Negative   UROBILINOGEN 4.0-6.0*   LEUKOCYTESUR Trace*   RBCUA 1   WBCUA 14*   BACTERIA  Rare   SQUAMEPITHEL 18   HYALINECASTS 3*       Significant Imaging: I have reviewed all pertinent imaging results/findings within the past 24 hours.    MRI brain  Impression:     Bilateral small scattered acute infarcts favoring embolic type infarcts.     COMMUNICATION  This critical result was discovered/received at 210 p.m..  The critical information above was relayed directly by me by telephone to Shabana cash LPN on 05/04/2023 at 14:17.        Electronically signed by: Delbert Cai  Date:                                            05/04/2023  Time:                                           14:18    Assessment and Plan:     48 y/o female with acute stroke    Stroke: multiple areas of infarction on both hemispheres concerning for embolic phenomena ([id number]-ozpaffafa, etc) (strokes follow a linear pattern which could also be watershed although no hypotension has been reported). Carotid US showed no significant stenosis.  MRA brain.  ASA 81 mg daily. Clopidogrel 75 mg daily.  Statin.  Telemetry monitoring.    This is a consult performed through audio-visual using [employer] cristiana. Pt and provider are in different locations. History and physical exam are limited due to the nature of this encounter.       Active Diagnoses:    Diagnosis Date Noted POA    PRINCIPAL PROBLEM:  Acute metabolic encephalopathy [G93.41] 05/04/2023 Yes    Morbid obesity with BMI of 45.0-49.9, adult [E66.01, Z68.42] 05/04/2023 Not Applicable    Severe sepsis [A41.9, R65.20] 05/04/2023 Yes    LUISA (acute kidney injury) [N17.9] 05/04/2023 Yes    Elevated troponin [R77.8] 05/04/2023 Yes    Type 2 diabetes mellitus with hyperglycemia [E11.65] 05/04/2023 Yes    Elevated LFTs [R79.89] 05/04/2023 Yes    High anion gap metabolic acidosis [E87.29] 05/04/2023 Yes    Back abscess [L02.212] 05/04/2023 Yes      Problems Resolved During this Admission:       VTE Risk Mitigation (From admission, onward)           Ordered     heparin (porcine) injection 5,000  Units  Every 12 hours         05/04/23 0456     IP VTE HIGH RISK PATIENT  Once         05/04/23 0456     Place sequential compression device  Until discontinued         05/04/23 0456     Place sequential compression device  Until discontinued         05/04/23 0043                    Thank you for your consult. I will follow-up with patient. Please contact us if you have any additional questions.    Javi Morales MD  Neurology  [facility] - Tele[facility] ([facility])

## 2023-05-05 NOTE — HOSPITAL COURSE
MRI brain revealed bilateral small scattered acute infarcts favoring embolic type infarcts.  Neurology consulted    Upon further examination, patient was found to have a large middle back abscess with purulent drainage.  CT of thoracic spine done without contrast showed subcutaneous on organized air-fluid collection dorsally consistent with clinical abscess or gas producing infection with no overt underlying bony change or involvement of the spinal canal.  Patient started on empiric antibiotics with Zosyn and clindamycin.  General surgery consulted and recommended MRI of thoracic spine and Neurosurgery consultation.  MRI of thoracic spine showed no overt bony marrow edema and no overt compromise of the thoracic canal, subcutaneous abscess or phlegmon with cutaneous fistula with no overt extension into the spinal canal.    Neurosurgery recommends no neurosurgical intervention as there is no evidence of cord compromise.  General surgery performed I&D of back abscess on 05/07/2023.  Blood cultures with no growth.  Per General surgery, patient will require daily wet-to-dry dressing changes with Kerlix gauze, keep Penrose in place.  Patient will need to follow up surgery Clinic 2 weeks postop at which time Penrose will be removed    Echocardiogram showed LVEF 55%, normal left ventricular diastolic function, no evidence of intracardiac shunting.  She will need cardiac monitoring to rule out AFib Given embolic nature of stroke    Mentation improved, patient awake, alert, oriented although notable to have lower extremity weakness and difficulty with word finding.  Neurology consulted and recommended MRA brain which showed degraded image, scattered bilateral foci acute ischemia supratentorial similar to MRI brain, apparent relative diminished flow of left MCA which may be partially occlusive, otherwise no large vessel occlusion or critical stenosis noted.    THERAPISTS' RECS  --SLP: Soft & Bite Sized Diet - IDDSI Level 6,  Liquid Diet Level: Thin liquids - IDDSI Level 0   --PT: Gait: PT AMB 50' X 2 TRIALS WITH RW AND MEHRAN, CUES FOR UPRIGHT POSTURE AND TAKING LARGER STEPS, 3 SMALL STANDING REST BREAKS, NO GROSS LOB, GOOD EFFORT. Balance: FAIR SITTING BALANCE, POOR+ DYNAMIC BALANCE DURING GAIT  --rehab still recommended for post acute therapy    Initially patient decided to refuse rehab placement, however after extensive discussion with patient regarding her need for significant assistance at this time and lack of support at home as patient does not have any family other than her daughter and grandson who can help her at home.  Case management working on placement for discharge.  Accepted at [facility], awaiting insurance authorization.    Patient will need referral to establish with a PCP after discharge as she states she does not have a PCP    Patient was planned for discharge to rehab on 05/15/2023 after insurance authorization was received.  However she decided that she does not want to go to rehab after all, as she states that she needs to go home to help her daughter and grandson as there is a legal issue which require her involvement.  Risks of going home versus the benefits of going to rehab discussed with patient extensively.  And she is adamant that she does not want to go rehab and feels comfortable about going home with home health if she can get a walker.    Accepted by [facility]. Prescription for meds sent to [facility] pharmacy and brought to patient prior to leaving. Patient seen and examined prior to discharge on 5/16/2023. High-risk for readmission

## 2023-05-05 NOTE — ASSESSMENT & PLAN NOTE
, , .  Total bilirubin 1.3 on arrival  Acute hepatitis panel negative  Abdominal ultrasound shows no acute abnormality  LFTs downtrending   Continue to monitor

## 2023-05-06 LAB
ALBUMIN SERPL BCP-MCNC: 2.1 G/DL (ref 3.5–5.2)
ALP SERPL-CCNC: 105 U/L (ref 55–135)
ALT SERPL W/O P-5'-P-CCNC: 64 U/L (ref 10–44)
ANION GAP SERPL CALC-SCNC: 11 MMOL/L (ref 8–16)
ANISOCYTOSIS BLD QL SMEAR: SLIGHT
AST SERPL-CCNC: 53 U/L (ref 10–40)
BASOPHILS NFR BLD: 0 % (ref 0–1.9)
BILIRUB SERPL-MCNC: 0.9 MG/DL (ref 0.1–1)
BUN SERPL-MCNC: 49 MG/DL (ref 6–20)
CALCIUM SERPL-MCNC: 8.8 MG/DL (ref 8.7–10.5)
CHLORIDE SERPL-SCNC: 110 MMOL/L (ref 95–110)
CK SERPL-CCNC: 1298 U/L (ref 20–180)
CO2 SERPL-SCNC: 23 MMOL/L (ref 23–29)
CREAT SERPL-MCNC: 1.7 MG/DL (ref 0.5–1.4)
DIFFERENTIAL METHOD: ABNORMAL
EOSINOPHIL NFR BLD: 4 % (ref 0–8)
ERYTHROCYTE [DISTWIDTH] IN BLOOD BY AUTOMATED COUNT: 14 % (ref 11.5–14.5)
EST. GFR  (NO RACE VARIABLE): 37 ML/MIN/1.73 M^2
GIANT PLATELETS BLD QL SMEAR: PRESENT
GLUCOSE SERPL-MCNC: 127 MG/DL (ref 70–110)
HCT VFR BLD AUTO: 25.4 % (ref 37–48.5)
HGB BLD-MCNC: 8 G/DL (ref 12–16)
HYPOCHROMIA BLD QL SMEAR: ABNORMAL
IMM GRANULOCYTES # BLD AUTO: ABNORMAL K/UL (ref 0–0.04)
IMM GRANULOCYTES NFR BLD AUTO: ABNORMAL % (ref 0–0.5)
LYMPHOCYTES NFR BLD: 20 % (ref 18–48)
MAGNESIUM SERPL-MCNC: 2.1 MG/DL (ref 1.6–2.6)
MCH RBC QN AUTO: 21.2 PG (ref 27–31)
MCHC RBC AUTO-ENTMCNC: 31.5 G/DL (ref 32–36)
MCV RBC AUTO: 67 FL (ref 82–98)
METAMYELOCYTES NFR BLD MANUAL: 2 %
MONOCYTES NFR BLD: 4 % (ref 4–15)
NEUTROPHILS NFR BLD: 68 % (ref 38–73)
NEUTS BAND NFR BLD MANUAL: 2 %
NRBC BLD-RTO: 1 /100 WBC
OVALOCYTES BLD QL SMEAR: ABNORMAL
PLATELET # BLD AUTO: 487 K/UL (ref 150–450)
PLATELET BLD QL SMEAR: ABNORMAL
PMV BLD AUTO: 10.1 FL (ref 9.2–12.9)
POCT GLUCOSE: 121 MG/DL (ref 70–110)
POCT GLUCOSE: 141 MG/DL (ref 70–110)
POCT GLUCOSE: 167 MG/DL (ref 70–110)
POCT GLUCOSE: 168 MG/DL (ref 70–110)
POIKILOCYTOSIS BLD QL SMEAR: SLIGHT
POLYCHROMASIA BLD QL SMEAR: ABNORMAL
POTASSIUM SERPL-SCNC: 3.3 MMOL/L (ref 3.5–5.1)
PROT SERPL-MCNC: 7.2 G/DL (ref 6–8.4)
RBC # BLD AUTO: 3.77 M/UL (ref 4–5.4)
SODIUM SERPL-SCNC: 144 MMOL/L (ref 136–145)
TARGETS BLD QL SMEAR: ABNORMAL
WBC # BLD AUTO: 13.93 K/UL (ref 3.9–12.7)

## 2023-05-06 PROCEDURE — 36415 COLL VENOUS BLD VENIPUNCTURE: CPT | Performed by: INTERNAL MEDICINE

## 2023-05-06 PROCEDURE — 25000003 PHARM REV CODE 250: Performed by: INTERNAL MEDICINE

## 2023-05-06 PROCEDURE — 63600175 PHARM REV CODE 636 W HCPCS: Performed by: INTERNAL MEDICINE

## 2023-05-06 PROCEDURE — 99232 PR SUBSEQUENT HOSPITAL CARE,LEVL II: ICD-10-PCS | Mod: ,,, | Performed by: SURGERY

## 2023-05-06 PROCEDURE — 92507 TX SP LANG VOICE COMM INDIV: CPT

## 2023-05-06 PROCEDURE — 21400001 HC TELEMETRY ROOM

## 2023-05-06 PROCEDURE — 80053 COMPREHEN METABOLIC PANEL: CPT | Performed by: INTERNAL MEDICINE

## 2023-05-06 PROCEDURE — 85027 COMPLETE CBC AUTOMATED: CPT | Performed by: INTERNAL MEDICINE

## 2023-05-06 PROCEDURE — 82550 ASSAY OF CK (CPK): CPT | Performed by: INTERNAL MEDICINE

## 2023-05-06 PROCEDURE — 83735 ASSAY OF MAGNESIUM: CPT | Performed by: INTERNAL MEDICINE

## 2023-05-06 PROCEDURE — 99232 SBSQ HOSP IP/OBS MODERATE 35: CPT | Mod: ,,, | Performed by: SURGERY

## 2023-05-06 PROCEDURE — 85007 BL SMEAR W/DIFF WBC COUNT: CPT | Performed by: INTERNAL MEDICINE

## 2023-05-06 RX ORDER — DEXTROSE 40 %
15 GEL (GRAM) ORAL
Status: DISCONTINUED | OUTPATIENT
Start: 2023-05-06 | End: 2023-05-16 | Stop reason: HOSPADM

## 2023-05-06 RX ORDER — SODIUM CHLORIDE, SODIUM LACTATE, POTASSIUM CHLORIDE, CALCIUM CHLORIDE 600; 310; 30; 20 MG/100ML; MG/100ML; MG/100ML; MG/100ML
INJECTION, SOLUTION INTRAVENOUS CONTINUOUS
Status: DISCONTINUED | OUTPATIENT
Start: 2023-05-07 | End: 2023-05-07

## 2023-05-06 RX ORDER — GLUCAGON 1 MG
1 KIT INJECTION
Status: DISCONTINUED | OUTPATIENT
Start: 2023-05-06 | End: 2023-05-16 | Stop reason: HOSPADM

## 2023-05-06 RX ORDER — ATORVASTATIN CALCIUM 40 MG/1
80 TABLET, FILM COATED ORAL NIGHTLY
Status: DISCONTINUED | OUTPATIENT
Start: 2023-05-06 | End: 2023-05-16 | Stop reason: HOSPADM

## 2023-05-06 RX ORDER — AMLODIPINE BESYLATE 5 MG/1
5 TABLET ORAL DAILY
Status: DISCONTINUED | OUTPATIENT
Start: 2023-05-06 | End: 2023-05-09

## 2023-05-06 RX ORDER — DEXTROSE 40 %
30 GEL (GRAM) ORAL
Status: DISCONTINUED | OUTPATIENT
Start: 2023-05-06 | End: 2023-05-16 | Stop reason: HOSPADM

## 2023-05-06 RX ORDER — HYDRALAZINE HYDROCHLORIDE 20 MG/ML
10 INJECTION INTRAMUSCULAR; INTRAVENOUS EVERY 8 HOURS PRN
Status: DISCONTINUED | OUTPATIENT
Start: 2023-05-06 | End: 2023-05-16 | Stop reason: HOSPADM

## 2023-05-06 RX ORDER — INSULIN ASPART 100 [IU]/ML
0-5 INJECTION, SOLUTION INTRAVENOUS; SUBCUTANEOUS
Status: DISCONTINUED | OUTPATIENT
Start: 2023-05-06 | End: 2023-05-16 | Stop reason: HOSPADM

## 2023-05-06 RX ADMIN — CLINDAMYCIN PHOSPHATE 600 MG: 600 INJECTION, SOLUTION INTRAVENOUS at 06:05

## 2023-05-06 RX ADMIN — POTASSIUM BICARBONATE 50 MEQ: 978 TABLET, EFFERVESCENT ORAL at 05:05

## 2023-05-06 RX ADMIN — PIPERACILLIN SODIUM AND TAZOBACTAM SODIUM 4.5 G: 4; .5 INJECTION, POWDER, FOR SOLUTION INTRAVENOUS at 03:05

## 2023-05-06 RX ADMIN — CLOPIDOGREL BISULFATE 75 MG: 75 TABLET ORAL at 08:05

## 2023-05-06 RX ADMIN — HEPARIN SODIUM 5000 UNITS: 5000 INJECTION INTRAVENOUS; SUBCUTANEOUS at 08:05

## 2023-05-06 RX ADMIN — CLINDAMYCIN PHOSPHATE 600 MG: 600 INJECTION, SOLUTION INTRAVENOUS at 10:05

## 2023-05-06 RX ADMIN — POTASSIUM BICARBONATE 50 MEQ: 977.5 TABLET, EFFERVESCENT ORAL at 08:05

## 2023-05-06 RX ADMIN — CLINDAMYCIN PHOSPHATE 600 MG: 600 INJECTION, SOLUTION INTRAVENOUS at 01:05

## 2023-05-06 RX ADMIN — AMLODIPINE BESYLATE 5 MG: 5 TABLET ORAL at 05:05

## 2023-05-06 RX ADMIN — ATORVASTATIN CALCIUM 80 MG: 40 TABLET, FILM COATED ORAL at 08:05

## 2023-05-06 RX ADMIN — PIPERACILLIN SODIUM AND TAZOBACTAM SODIUM 4.5 G: 4; .5 INJECTION, POWDER, FOR SOLUTION INTRAVENOUS at 05:05

## 2023-05-06 RX ADMIN — HYDRALAZINE HYDROCHLORIDE 10 MG: 20 INJECTION, SOLUTION INTRAMUSCULAR; INTRAVENOUS at 08:05

## 2023-05-06 RX ADMIN — ASPIRIN 81 MG CHEWABLE TABLET 81 MG: 81 TABLET CHEWABLE at 08:05

## 2023-05-06 NOTE — SUBJECTIVE & OBJECTIVE
Interval History: still with draining from back abscess    Medications:  Continuous Infusions:  Scheduled Meds:   aspirin  81 mg Oral Daily    atorvastatin  40 mg Oral QHS    clindamycin (CLEOCIN) IVPB  600 mg Intravenous Q8H    clopidogreL  75 mg Oral Daily    heparin (porcine)  5,000 Units Subcutaneous Q12H    piperacillin-tazobactam (ZOSYN) IVPB  4.5 g Intravenous Q8H     PRN Meds:acetaminophen, dextrose 10%, dextrose 10%, dextrose, dextrose, glucagon (human recombinant), hydrALAZINE, insulin aspart U-100, ondansetron, sodium chloride 0.9%     Review of patient's allergies indicates:  No Known Allergies  Objective:     Vital Signs (Most Recent):  Temp: 98.4 °F (36.9 °C) (05/06/23 1142)  Pulse: 67 (05/06/23 1142)  Resp: 18 (05/06/23 1142)  BP: (!) 162/69 (05/06/23 1142)  SpO2: 99 % (05/06/23 1142) Vital Signs (24h Range):  Temp:  [96.2 °F (35.7 °C)-98.8 °F (37.1 °C)] 98.4 °F (36.9 °C)  Pulse:  [67-82] 67  Resp:  [18-20] 18  SpO2:  [97 %-100 %] 99 %  BP: (158-204)/(69-95) 162/69     Weight: 124 kg (273 lb 5.9 oz)  Body mass index is 46.92 kg/m².    Intake/Output - Last 3 Shifts         05/04 0700  05/05 0659 05/05 0700  05/06 0659 05/06 0700  05/07 0659    P.O.  480     I.V. (mL/kg) 900.9 (7.1)      IV Piggyback 175.3 204.4     Total Intake(mL/kg) 1076.2 (8.5) 684.4 (5.5)     Urine (mL/kg/hr) 800 (0.3) 500 (0.2)     Total Output 800 500     Net +276.2 +184.4                     Physical Exam  Vitals reviewed.   Constitutional:       Appearance: She is well-developed. She is obese.   HENT:      Head: Normocephalic and atraumatic.   Eyes:      Conjunctiva/sclera: Conjunctivae normal.   Neck:      Thyroid: No thyromegaly.   Cardiovascular:      Rate and Rhythm: Normal rate.   Pulmonary:      Effort: Pulmonary effort is normal. No respiratory distress.   Abdominal:      General: There is no distension.      Palpations: Abdomen is soft. There is no mass.      Tenderness: There is no abdominal tenderness.   Skin:      Comments: Midline T-spine with actively purulent draining area of fluctuance and induration consistent with abscess. Probed with cotton tipped applicator and abscess cavity is at least 5cm deep and measures around 8cm x 9cm.    Neurological:      Mental Status: She is alert.        Significant Labs:  I have reviewed all pertinent lab results within the past 24 hours.  CBC:   Recent Labs   Lab 05/06/23 0615   WBC 13.93*   RBC 3.77*   HGB 8.0*   HCT 25.4*   *   MCV 67*   MCH 21.2*   MCHC 31.5*       BMP:   Recent Labs   Lab 05/06/23 0615   *      K 3.3*      CO2 23   BUN 49*   CREATININE 1.7*   CALCIUM 8.8   MG 2.1         Significant Diagnostics:  I have reviewed all pertinent imaging results/findings within the past 24 hours.    Impression:     Imaging degraded related to large body habitus.  As visualized no overt bony marrow edema.     Discogenic spondylitic indentation upon the ventral thecal sac lower cervical spine incompletely evaluated.  No overt compromise of the thoracic canal.  Thoracic cord not well discriminated but without overt pathology.     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  No overt extension into the spinal canal.

## 2023-05-06 NOTE — PROGRESS NOTES
[facility] Medicine  Progress Note    Patient Name: Kenia Roper  MRN: [id number]  Patient Class: IP- Inpatient   Admission Date: 5/3/2023  Length of Stay: 2 days  Attending Physician: Heydi Cardozo DO  Primary Care Provider: Ross Perdomo MD        Subjective:     Principal Problem:Acute metabolic encephalopathy        HPI:  Ms. Roper is a 57-year-old morbidly obese  female, was brought in to the ED in an unresponsive state.  No family at the bedside, unable to obtain any information from patient.  No information per Care everywhere.  Most of the information obtained from ED staff.  Per report, patient was found down at home, covered in feces and urine.  Last known well two days ago.  No report of fever or chills.  Known history of CVA and type 2 diabetes mellitus.  In the ED she is afebrile, mildly tachycardic HR .  WBC elevated 20,000. BUN 60, creatinine 3.5 (unknown baseline).  Elevated LFTs in the 400s.  Troponin elevated 0.259, unknown if she has chest pain or SOB.  EKG NSR with no ST T wave changes.  Hemodynamically stable.  Presumed severe sepsis with LUISA/AMS, she received 30 mL/kilogram bolus in the ED along with IV Zosyn empirically.  Cultures obtained.  UDS positive for opiates.  CT head is negative for acute intracranial pathology.  CXR negative for infectious process.    Admitting diagnosis:  Severe sepsis.  Acute encephalopathy (TIA versus uremia).  LUISA.  Elevated troponin.  Elevated LFTs.      Overview/Hospital Course:  MRI brain revealed bilateral small scattered acute infarcts favoring embolic type infarcts.  Neurology consulted    Upon further examination, patient was found to have a large middle back abscess with purulent drainage.  CT of thoracic spine done without contrast showed subcutaneous on organized air-fluid collection dorsally consistent with clinical abscess or gas producing infection with no overt underlying bony change or  involvement of the spinal canal.  Patient started on empiric antibiotics with Zosyn and clindamycin.  General surgery consulted and recommended MRI of thoracic spine and Neurosurgery consultation.  MRI of thoracic spine showed no overt bony marrow edema and no overt compromise of the thoracic canal, subcutaneous abscess or phlegmon with cutaneous fistula with no overt extension into the spinal canal.    Mentation improved, patient awake, alert, oriented although notable to have lower extremity weakness and difficulty with word finding.  Neurology consulted and recommended MRA brain which showed degraded image, scattered bilateral foci acute ischemia supratentorial similar to MRI brain, apparent relative diminished flow of left MCA which may be partially occlusive, otherwise no large vessel occlusion or critical stenosis noted.    PT/OT/SLP recommended rehab placement on discharge.  SLP also recommends patient be placed on soft and bite size diet (IDDSI level 6), with thin liquids  .  Neurosurgery recommends no neurosurgical intervention as there is no evidence of cord compromise.  General surgery plans I&D of abscess on 05/07/2023      Interval History:  No acute events overnight.  Seen examined without any family present.  Patient denies any complaints including headache, changes in vision, chest pain, shortness of breath.  Tolerating diet.  Discussed with Neurosurgery, they recommend no neurosurgical intervention.  General surgery plans I and D of back abscess tomorrow.  Contacted patient's daughter Yuliya and updated her about patient's diagnosis of stroke as well as back abscess.  Daughter does not know if patient takes medication other than something to help her sleep, reports that at baseline patient does not use a cane or walker to ambulate at denies any deficit after her prior stroke    Review of Systems  Objective:     Vital Signs (Most Recent):  Temp: 98.4 °F (36.9 °C) (05/06/23 1142)  Pulse: 67 (05/06/23  1142)  Resp: 18 (05/06/23 1142)  BP: (!) 162/69 (05/06/23 1142)  SpO2: 99 % (05/06/23 1142) Vital Signs (24h Range):  Temp:  [96.2 °F (35.7 °C)-98.8 °F (37.1 °C)] 98.4 °F (36.9 °C)  Pulse:  [67-82] 67  Resp:  [18-20] 18  SpO2:  [97 %-100 %] 99 %  BP: (158-204)/(69-95) 162/69     Weight: 124 kg (273 lb 5.9 oz)  Body mass index is 46.92 kg/m².    Intake/Output Summary (Last 24 hours) at 5/6/2023 1532  Last data filed at 5/5/2023 1756  Gross per 24 hour   Intake 444.37 ml   Output --   Net 444.37 ml         Physical Exam  Vitals and nursing note reviewed.   Constitutional:       Appearance: She is morbidly obese.   HENT:      Head: Normocephalic and atraumatic.      Right Ear: External ear normal.      Left Ear: External ear normal.      Nose: Nose normal.      Mouth/Throat:      Mouth: Mucous membranes are moist.   Eyes:      Pupils: Pupils are equal, round, and reactive to light.   Cardiovascular:      Rate and Rhythm: Normal rate and regular rhythm.   Pulmonary:      Effort: Pulmonary effort is normal. No accessory muscle usage or respiratory distress.      Breath sounds: No wheezing.      Comments: On room air  Abdominal:      General: There is no distension.      Palpations: Abdomen is soft.      Tenderness: There is no abdominal tenderness. There is no guarding or rebound.   Musculoskeletal:      Cervical back: Neck supple.   Skin:     General: Skin is warm and dry.   Neurological:      Mental Status: She is alert and oriented to person, place, and time.      Comments: 2/5 bilateral lower extremity muscle strength           Significant Labs: All pertinent labs within the past 24 hours have been reviewed.  CBC:   Recent Labs   Lab 05/05/23 0433 05/06/23 0615   WBC 15.21* 13.93*   HGB 7.8* 8.0*   HCT 23.9* 25.4*   * 487*     CMP:   Recent Labs   Lab 05/05/23 0433 05/06/23 0615   * 144   K 3.6 3.3*   * 110   CO2 22* 23   * 127*   BUN 60* 49*   CREATININE 2.1* 1.7*   CALCIUM 8.9 8.8    PROT 7.4 7.2   ALBUMIN 2.1* 2.1*   BILITOT 1.0 0.9   ALKPHOS 141* 105   * 53*   * 64*   ANIONGAP 14 11       Significant Imaging: I have reviewed all pertinent imaging results/findings within the past 24 hours.      Assessment/Plan:      * Acute metabolic encephalopathy  Patient found down at home, last well-known two days prior to ED presentation  History of CVA, lives at home with family and apparently able to participate in ADLs including babysitting her grandchild  CT head negative for acute intracranial pathology, however MRI brain revealed bilateral small acute infarcts consistent with embolic stroke  Also found to have back abscess on spine imaging  Metabolic encephalopathy noted on admission apparently multifactorial in etiology secondary to stroke and infectious process   Mentation improved    Elevated CPK  Elevation likely secondary to muscle breakdown as patient was found down at home for unknown period of time  Trended down with IV fluid    Cerebrovascular accident (CVA) due to embolism  Patient noted to have multiple areas of infarction on both hemispheres concerning for embolic phenomena  Carotid ultrasound showed no significant stenosis     Antithrombotics for secondary stroke prevention: Antiplatelets: Aspirin: 81 mg daily  Clopidogrel: 75 mg daily    Statins for secondary stroke prevention and hyperlipidemia, if present:   Statins: Atorvastatin- 80 mg daily    Aggressive risk factor modification: HTN, DM, Obesity     Rehab efforts: The patient has been evaluated by a stroke team provider and the therapy needs have been fully considered based off the presenting complaints and exam findings. The following therapy evaluations are needed: PT evaluate and treat, OT evaluate and treat, SLP evaluate and treat    Diagnostics ordered/pending: MRA head to assess vasculature, TTE to assess cardiac function/status , TSH to assess thyroid function    VTE prophylaxis: Enoxaparin 40 mg SQ every 24  hours    BP parameters: Infarct: No intervention, SBP <220    PT/OT recommended rehab placement on discharge   SLP recommends dysphagia soft diet with thin liquid    Back abscess  Patient found to have drainage of purulence material from her back  CT of thoracic spine confirms abscess, MRI shows no spine involvement  General surgery consulted, recommended neurosurgery evaluation  No surgical intervention recommended  Planned for I and D by General surgery on 05/07/2023      High anion gap metabolic acidosis  Resolved with IV fluids in setting of LUISA versus LUISA superimposed on CKD  Monitor      Elevated LFTs  , , .  Total bilirubin 1.3 on arrival  Acute hepatitis panel negative  Abdominal ultrasound shows no acute abnormality  LFTs downtrending   Continue to monitor      Type 2 diabetes mellitus with hyperglycemia  Blood sugar mildly elevated 166 on admission  Hemoglobin A1c 7.1    Accu-Cheks with SSI  Diabetic diet  Optimize diabetic regimen in setting of acute embolic stroke  Unclear if patient is on any meds for diabetes outpatient    Elevated troponin  Troponin elevated 0.25 on admission, in the setting of elevated creatinine 3.5.  Repeat troponin remained flat  EKG NSR with no ST or T-wave changes.    Likely demand ischemia in setting of infectious process and acute embolic stroke  Although patient initially encephalopathic, now that mentation has improved denies any chest pain    LUISA (acute kidney injury)  Patient with acute kidney injury likely due to IVVD/dehydration and acute tubular necrosis LUISA is currently improving. Labs reviewed- Renal function/electrolytes with Estimated Creatinine Clearance: 53.2 mL/min (A) (based on SCr of 1.7 mg/dL (H)). according to latest data. Monitor urine output and serial BMP and adjust therapy as needed. Avoid nephrotoxins and renally dose meds for GFR listed above.     creatinine elevated 3.5 on admission, downtrending with IV fluid administration    Nephrology consulted and have signed off   IV fluid discontinued given history of CHF noted on care everywhere and patient is able to tolerate oral intake.  However given patient will be NPO after midnight tomorrow for upcoming surgery, we will start patient gentle IV for 1 day      Severe sepsis  This patient does have evidence of infective focus  My overall impression is sepsis.  Source: Skin and Soft Tissue (location Back)  Antibiotics given-   Antibiotics (72h ago, onward)    Start     Stop Route Frequency Ordered    05/04/23 0600  clindamycin in D5W 600 mg/50 mL IVPB 600 mg         -- IV Every 8 hours (non-standard times) 05/04/23 0454        Latest lactate reviewed-  Recent Labs   Lab 05/03/23  2333   LACTATE 1.3     Organ dysfunction indicated by Acute kidney injury and Acute liver injury    Fluid challenge Ideal Body Weight- The patient's ideal body weight is Ideal body weight: 54.7 kg (120 lb 9.5 oz) which will be used to calculate fluid bolus of 30 ml/kg for treatment of septic shock.      Post- resuscitation assessment No - Post resuscitation assessment not needed       Will Not start Pressors- Levophed for MAP of 65  Source control achieved by:  IV antibiotics.      -afebrile.  HR .  WBC 99109, 0% bands, elevated procalcitonin on admission  -Leukocytosis downtrending   -possible multiple etiologies of infectious process secondary to UTI and back abscess  -blood cultures NGTD, urine culture in process, will follow up  -continue empiric IV clindamycin.  Zosyn discontinued given urine culture showed no growth    Morbid obesity with BMI of 45.0-49.9, adult  Body mass index is 46.92 kg/m². Morbid obesity complicates all aspects of disease management from diagnostic modalities to treatment. Weight loss encouraged and health benefits explained to patient.           VTE Risk Mitigation (From admission, onward)         Ordered     heparin (porcine) injection 5,000 Units  Every 12 hours         05/04/23  0456     IP VTE HIGH RISK PATIENT  Once         05/04/23 0456     Place sequential compression device  Until discontinued         05/04/23 0456     Place sequential compression device  Until discontinued         05/04/23 0043                Discharge Planning   CRISPIN:      Code Status: Full Code   Is the patient medically ready for discharge?:     Reason for patient still in hospital (select all that apply): Patient trending condition, Treatment, Consult recommendations and Pending disposition  Discharge Plan A: Rehab                  Heydi Cardozo DO  Department of Hospital Medicine   [facility] - Telemetry ([facility])

## 2023-05-06 NOTE — ASSESSMENT & PLAN NOTE
Patient found down at home, last well-known two days prior to ED presentation  History of CVA, lives at home with family and apparently able to participate in ADLs including babysitting her grandchild  CT head negative for acute intracranial pathology, however MRI brain revealed bilateral small acute infarcts consistent with embolic stroke  Also found to have back abscess on spine imaging  Metabolic encephalopathy noted on admission apparently multifactorial in etiology secondary to stroke and infectious process   Mentation improved

## 2023-05-06 NOTE — PLAN OF CARE
Discussed Plan of Care with patient and verbalized understanding - Patient remains AAOx2 - remains free of falls, accidents and trauma during the day shift. Bed is in the low position and the call light is within reach. Patient to be NPO after midnight for I/D on Sunday. Will continue to monitor

## 2023-05-06 NOTE — ASSESSMENT & PLAN NOTE
Body mass index is 46.92 kg/m². Morbid obesity complicates all aspects of disease management from diagnostic modalities to treatment. Weight loss encouraged and health benefits explained to patient.

## 2023-05-06 NOTE — ASSESSMENT & PLAN NOTE
This patient does have evidence of infective focus  My overall impression is sepsis.  Source: Skin and Soft Tissue (location Back)  Antibiotics given-   Antibiotics (72h ago, onward)    Start     Stop Route Frequency Ordered    05/04/23 0600  clindamycin in D5W 600 mg/50 mL IVPB 600 mg         -- IV Every 8 hours (non-standard times) 05/04/23 0454        Latest lactate reviewed-  Recent Labs   Lab 05/03/23  2333   LACTATE 1.3     Organ dysfunction indicated by Acute kidney injury and Acute liver injury    Fluid challenge Ideal Body Weight- The patient's ideal body weight is Ideal body weight: 54.7 kg (120 lb 9.5 oz) which will be used to calculate fluid bolus of 30 ml/kg for treatment of septic shock.      Post- resuscitation assessment No - Post resuscitation assessment not needed       Will Not start Pressors- Levophed for MAP of 65  Source control achieved by:  IV antibiotics.      -afebrile.  HR .  WBC 68005, 0% bands, elevated procalcitonin on admission  -Leukocytosis downtrending   -possible multiple etiologies of infectious process secondary to UTI and back abscess  -blood cultures NGTD, urine culture in process, will follow up  -continue empiric IV clindamycin.  Zosyn discontinued given urine culture showed no growth

## 2023-05-06 NOTE — PROGRESS NOTES
[facility] - Telemetry ([facility])  General Surgery  Progress Note    Subjective:     History of Present Illness:  58yo F who is admitted to the [facility] medicine service for evaluation of altered mental status, CVA and being found unresponsive at home.  Patient was reportedly found down at home covered in feces and urine with her last known well check around 2 days before this.  Has a known history of CVA and type 2 DM.  Was found to be septic upon presentation with a leukocytosis of 20k and elevated creatinine at 3.5.  She is admitted to the [facility] medical service for evaluation of altered mental status and neurologic changes along with workup of her sepsis.  She was found to have a large middle back abscess in her upper back which was actively draining purulence upon admission.  Wound care was consulted and thought she may need surgical debridement.  General surgery was consulted for evaluation.  She is a pending MRI T-spine.  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      Post-Op Info:  Procedure(s) (LRB):  INCISION AND DRAINAGE, ABSCESS (N/A)         Interval History: still with draining from back abscess    Medications:  Continuous Infusions:  Scheduled Meds:   aspirin  81 mg Oral Daily    atorvastatin  40 mg Oral QHS    clindamycin (CLEOCIN) IVPB  600 mg Intravenous Q8H    clopidogreL  75 mg Oral Daily    heparin (porcine)  5,000 Units Subcutaneous Q12H    piperacillin-tazobactam (ZOSYN) IVPB  4.5 g Intravenous Q8H     PRN Meds:acetaminophen, dextrose 10%, dextrose 10%, dextrose, dextrose, glucagon (human recombinant), hydrALAZINE, insulin aspart U-100, ondansetron, sodium chloride 0.9%     Review of patient's allergies indicates:  No Known Allergies  Objective:     Vital Signs (Most Recent):  Temp: 98.4 °F (36.9 °C) (05/06/23 1142)  Pulse: 67 (05/06/23 1142)  Resp: 18 (05/06/23 1142)  BP: (!) 162/69 (05/06/23 1142)  SpO2: 99 % (05/06/23 1142) Vital Signs (24h  Range):  Temp:  [96.2 °F (35.7 °C)-98.8 °F (37.1 °C)] 98.4 °F (36.9 °C)  Pulse:  [67-82] 67  Resp:  [18-20] 18  SpO2:  [97 %-100 %] 99 %  BP: (158-204)/(69-95) 162/69     Weight: 124 kg (273 lb 5.9 oz)  Body mass index is 46.92 kg/m².    Intake/Output - Last 3 Shifts         05/04 0700  05/05 0659 05/05 0700  05/06 0659 05/06 0700  05/07 0659    P.O.  480     I.V. (mL/kg) 900.9 (7.1)      IV Piggyback 175.3 204.4     Total Intake(mL/kg) 1076.2 (8.5) 684.4 (5.5)     Urine (mL/kg/hr) 800 (0.3) 500 (0.2)     Total Output 800 500     Net +276.2 +184.4                    Physical Exam  Vitals reviewed.   Constitutional:       Appearance: She is well-developed. She is obese.   HENT:      Head: Normocephalic and atraumatic.   Eyes:      Conjunctiva/sclera: Conjunctivae normal.   Neck:      Thyroid: No thyromegaly.   Cardiovascular:      Rate and Rhythm: Normal rate.   Pulmonary:      Effort: Pulmonary effort is normal. No respiratory distress.   Abdominal:      General: There is no distension.      Palpations: Abdomen is soft. There is no mass.      Tenderness: There is no abdominal tenderness.   Skin:     Comments: Midline T-spine with actively purulent draining area of fluctuance and induration consistent with abscess. Probed with cotton tipped applicator and abscess cavity is at least 5cm deep and measures around 8cm x 9cm.    Neurological:      Mental Status: She is alert.        Significant Labs:  I have reviewed all pertinent lab results within the past 24 hours.  CBC:   Recent Labs   Lab 05/06/23 0615   WBC 13.93*   RBC 3.77*   HGB 8.0*   HCT 25.4*   *   MCV 67*   MCH 21.2*   MCHC 31.5*       BMP:   Recent Labs   Lab 05/06/23 0615   *      K 3.3*      CO2 23   BUN 49*   CREATININE 1.7*   CALCIUM 8.8   MG 2.1         Significant Diagnostics:  I have reviewed all pertinent imaging results/findings within the past 24 hours.    Impression:     Imaging degraded related to large body habitus.   As visualized no overt bony marrow edema.     Discogenic spondylitic indentation upon the ventral thecal sac lower cervical spine incompletely evaluated.  No overt compromise of the thoracic canal.  Thoracic cord not well discriminated but without overt pathology.     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  No overt extension into the spinal canal.    Assessment/Plan:     * Acute metabolic encephalopathy  Care per primary team    Back abscess  48yo F with large abscess in upper back/T-spine area    -MRI reviewed no clear connection with spine   -Plan for drainage and OR tomorrow  -NPO after midnight  - agree with IV antibiotics  - rest of care per primary team    High anion gap metabolic acidosis  Care per primary team    Type 2 diabetes mellitus with hyperglycemia  Care per primary team    Elevated troponin  Care per primary team    LUISA (acute kidney injury)  Care per primary team/nephrology    Severe sepsis  Care per primary team    Morbid obesity with BMI of 45.0-49.9, adult  Care per primary team        Bertin Spivey MD  General Surgery  [facility] - Telemetry ([facility])

## 2023-05-06 NOTE — SUBJECTIVE & OBJECTIVE
Interval History:  No acute events overnight.  Seen examined without any family present.  Patient denies any complaints including headache, changes in vision, chest pain, shortness of breath.  Tolerating diet.  Discussed with Neurosurgery, they recommend no neurosurgical intervention.  General surgery plans I and D of back abscess tomorrow.  Contacted patient's daughter Yuliya and updated her about patient's diagnosis of stroke as well as back abscess.  Daughter does not know if patient takes medication other than something to help her sleep, reports that at baseline patient does not use a cane or walker to ambulate at denies any deficit after her prior stroke    Review of Systems  Objective:     Vital Signs (Most Recent):  Temp: 98.4 °F (36.9 °C) (05/06/23 1142)  Pulse: 67 (05/06/23 1142)  Resp: 18 (05/06/23 1142)  BP: (!) 162/69 (05/06/23 1142)  SpO2: 99 % (05/06/23 1142) Vital Signs (24h Range):  Temp:  [96.2 °F (35.7 °C)-98.8 °F (37.1 °C)] 98.4 °F (36.9 °C)  Pulse:  [67-82] 67  Resp:  [18-20] 18  SpO2:  [97 %-100 %] 99 %  BP: (158-204)/(69-95) 162/69     Weight: 124 kg (273 lb 5.9 oz)  Body mass index is 46.92 kg/m².    Intake/Output Summary (Last 24 hours) at 5/6/2023 1532  Last data filed at 5/5/2023 1756  Gross per 24 hour   Intake 444.37 ml   Output --   Net 444.37 ml         Physical Exam  Vitals and nursing note reviewed.   Constitutional:       Appearance: She is morbidly obese.   HENT:      Head: Normocephalic and atraumatic.      Right Ear: External ear normal.      Left Ear: External ear normal.      Nose: Nose normal.      Mouth/Throat:      Mouth: Mucous membranes are moist.   Eyes:      Pupils: Pupils are equal, round, and reactive to light.   Cardiovascular:      Rate and Rhythm: Normal rate and regular rhythm.   Pulmonary:      Effort: Pulmonary effort is normal. No accessory muscle usage or respiratory distress.      Breath sounds: No wheezing.      Comments: On room air  Abdominal:      General:  There is no distension.      Palpations: Abdomen is soft.      Tenderness: There is no abdominal tenderness. There is no guarding or rebound.   Musculoskeletal:      Cervical back: Neck supple.   Skin:     General: Skin is warm and dry.   Neurological:      Mental Status: She is alert and oriented to person, place, and time.      Comments: 2/5 bilateral lower extremity muscle strength           Significant Labs: All pertinent labs within the past 24 hours have been reviewed.  CBC:   Recent Labs   Lab 05/05/23 0433 05/06/23  0615   WBC 15.21* 13.93*   HGB 7.8* 8.0*   HCT 23.9* 25.4*   * 487*     CMP:   Recent Labs   Lab 05/05/23  0433 05/06/23  0615   * 144   K 3.6 3.3*   * 110   CO2 22* 23   * 127*   BUN 60* 49*   CREATININE 2.1* 1.7*   CALCIUM 8.9 8.8   PROT 7.4 7.2   ALBUMIN 2.1* 2.1*   BILITOT 1.0 0.9   ALKPHOS 141* 105   * 53*   * 64*   ANIONGAP 14 11       Significant Imaging: I have reviewed all pertinent imaging results/findings within the past 24 hours.

## 2023-05-06 NOTE — PT/OT/SLP PROGRESS
Speech Language Pathology Treatment    Patient Name:  Kenia Roper   MRN:  [id number]  Admitting Diagnosis: Acute metabolic encephalopathy    Recommendations:                 General Recommendations:  Speech/language therapy  Diet recommendations:  Soft & Bite Sized Diet - IDDSI Level 6, Liquid Diet Level: Thin liquids - IDDSI Level 0   Aspiration Precautions: Standard aspiration precautions   General Precautions: Standard, aspiration  Communication strategies:  provide increased time to answer and go to room if call light pushed    Assistance with meals    Subjective     Pt initially asleep but did awake and participate with cues   Patient goals: Pt wanted some ice and water to drink      Pain/Comfort:   0/10    Respiratory Status: Room air    Objective:     Has the patient been evaluated by SLP for swallowing?    yes  Keep patient NPO?   no  Current Respiratory Status:   see medical chart     Pt was seen by S.T. and participated in the following tasks:  Orientation: oriented to person and place (I) but needed min cues for orientation to date  Simple commands: 100%  Complex commands: 70%  Simple yes/no questions: 90%  Lingual and labial Strengthening tasks x15 each  Recall of 3 items with 5 minute delay: recalled 1/3 items  Swallow precautions reviewed; pt did eat some ice and drank cup of water with no observable difficulties.    Assessment:     Kenia Roper is a 47 y.o. female with an SLP diagnosis of Dysarthira, decreased memory and cognitive skills.  Pt participated with all tasks but needed cues to remain alert.  Pt easily awakened but attempted to fall back asleep with cues required.  Pt noted with mild-moderate dysarthria and decreased short term memory skills.  Swallowing precautions reviewed with the patient.     Goals:   Multidisciplinary Problems       SLP Goals          Problem: SLP    Goal Priority Disciplines Outcome   SLP Goal     SLP Ongoing, Progressing   Description: 1.  Pt will consume the  least restrictive PO diet with ongoing bedside assessment as mentation improves. MET 5/5/23--IDDSI 6/IDDSI 0 recommended.  1a. Pt will consume IDDSI 6/IDDSI 0 without incident.  1b. Pt will improve efficiency for diet advancement to regular solids (IDDSI 7).  2.  Pt will improve cognitive-linguistic skills to meet basic/functional communicative needs related to orientation, memory recall and judgment/reasoning.                       Plan:     Patient to be seen:  2 x/week, 3 x/week   Plan of Care expires:  05/11/23  Plan of Care reviewed with:  patient   SLP Follow-Up:  Yes       Discharge recommendations:  rehabilitation facility   Barriers to Discharge:  Level of Skilled Assistance Needed   and Safety Awareness      Time Tracking:     SLP Treatment Date:    05/06/23  Speech Start Time:   1355  Speech Stop Time:   1420     Speech Total Time (min):   25 minutes    Billable Minutes: Total Time 25    05/06/2023

## 2023-05-06 NOTE — ASSESSMENT & PLAN NOTE
Blood sugar mildly elevated 166 on admission  Hemoglobin A1c 7.1    Accu-Cheks with SSI  Diabetic diet  Optimize diabetic regimen in setting of acute embolic stroke  Unclear if patient is on any meds for diabetes outpatient

## 2023-05-06 NOTE — ASSESSMENT & PLAN NOTE
Patient with acute kidney injury likely due to IVVD/dehydration and acute tubular necrosis LUISA is currently improving. Labs reviewed- Renal function/electrolytes with Estimated Creatinine Clearance: 53.2 mL/min (A) (based on SCr of 1.7 mg/dL (H)). according to latest data. Monitor urine output and serial BMP and adjust therapy as needed. Avoid nephrotoxins and renally dose meds for GFR listed above.     creatinine elevated 3.5 on admission, downtrending with IV fluid administration   Nephrology consulted and have signed off   IV fluid discontinued given history of CHF noted on care everywhere and patient is able to tolerate oral intake.  However given patient will be NPO after midnight tomorrow for upcoming surgery, we will start patient gentle IV for 1 day

## 2023-05-06 NOTE — ASSESSMENT & PLAN NOTE
Patient noted to have multiple areas of infarction on both hemispheres concerning for embolic phenomena  Carotid ultrasound showed no significant stenosis     Antithrombotics for secondary stroke prevention: Antiplatelets: Aspirin: 81 mg daily  Clopidogrel: 75 mg daily    Statins for secondary stroke prevention and hyperlipidemia, if present:   Statins: Atorvastatin- 80 mg daily    Aggressive risk factor modification: HTN, DM, Obesity     Rehab efforts: The patient has been evaluated by a stroke team provider and the therapy needs have been fully considered based off the presenting complaints and exam findings. The following therapy evaluations are needed: PT evaluate and treat, OT evaluate and treat, SLP evaluate and treat    Diagnostics ordered/pending: MRA head to assess vasculature, TTE to assess cardiac function/status , TSH to assess thyroid function    VTE prophylaxis: Enoxaparin 40 mg SQ every 24 hours    BP parameters: Infarct: No intervention, SBP <220    PT/OT recommended rehab placement on discharge   SLP recommends dysphagia soft diet with thin liquid

## 2023-05-06 NOTE — ASSESSMENT & PLAN NOTE
Patient found to have drainage of purulence material from her back  CT of thoracic spine confirms abscess, MRI shows no spine involvement  General surgery consulted, recommended neurosurgery evaluation  No surgical intervention recommended  Planned for I and D by General surgery on 05/07/2023

## 2023-05-06 NOTE — ASSESSMENT & PLAN NOTE
46yo F with large abscess in upper back/T-spine area    -MRI reviewed no clear connection with spine   -Plan for drainage and OR tomorrow  -NPO after midnight  - agree with IV antibiotics  - rest of care per primary team

## 2023-05-07 ENCOUNTER — ANESTHESIA EVENT (OUTPATIENT)
Dept: SURGERY | Facility: HOSPITAL | Age: 48
DRG: 064 | End: 2023-05-07
Payer: MEDICAID

## 2023-05-07 ENCOUNTER — ANESTHESIA (OUTPATIENT)
Dept: SURGERY | Facility: HOSPITAL | Age: 48
DRG: 064 | End: 2023-05-07
Payer: MEDICAID

## 2023-05-07 LAB
ALBUMIN SERPL BCP-MCNC: 2.2 G/DL (ref 3.5–5.2)
ALP SERPL-CCNC: 99 U/L (ref 55–135)
ALT SERPL W/O P-5'-P-CCNC: 47 U/L (ref 10–44)
ANION GAP SERPL CALC-SCNC: 13 MMOL/L (ref 8–16)
ANISOCYTOSIS BLD QL SMEAR: SLIGHT
AST SERPL-CCNC: 42 U/L (ref 10–40)
BASOPHILS # BLD AUTO: ABNORMAL K/UL (ref 0–0.2)
BASOPHILS NFR BLD: 0 % (ref 0–1.9)
BILIRUB SERPL-MCNC: 0.6 MG/DL (ref 0.1–1)
BSA FOR ECHO PROCEDURE: 2.36 M2
BUN SERPL-MCNC: 38 MG/DL (ref 6–20)
CALCIUM SERPL-MCNC: 8.8 MG/DL (ref 8.7–10.5)
CHLORIDE SERPL-SCNC: 108 MMOL/L (ref 95–110)
CK SERPL-CCNC: 646 U/L (ref 20–180)
CO2 SERPL-SCNC: 22 MMOL/L (ref 23–29)
CREAT SERPL-MCNC: 1.4 MG/DL (ref 0.5–1.4)
CV ECHO LV RWT: 0.73 CM
DACRYOCYTES BLD QL SMEAR: ABNORMAL
DIFFERENTIAL METHOD: ABNORMAL
ECHO LV POSTERIOR WALL: 1.84 CM (ref 0.6–1.1)
EJECTION FRACTION: 55 %
EOSINOPHIL # BLD AUTO: ABNORMAL K/UL (ref 0–0.5)
EOSINOPHIL NFR BLD: 3 % (ref 0–8)
ERYTHROCYTE [DISTWIDTH] IN BLOOD BY AUTOMATED COUNT: 14.1 % (ref 11.5–14.5)
EST. GFR  (NO RACE VARIABLE): 47 ML/MIN/1.73 M^2
FRACTIONAL SHORTENING: 26 % (ref 28–44)
GLUCOSE SERPL-MCNC: 131 MG/DL (ref 70–110)
HCT VFR BLD AUTO: 26.4 % (ref 37–48.5)
HGB BLD-MCNC: 8.3 G/DL (ref 12–16)
IMM GRANULOCYTES # BLD AUTO: ABNORMAL K/UL (ref 0–0.04)
IMM GRANULOCYTES NFR BLD AUTO: ABNORMAL % (ref 0–0.5)
INTERVENTRICULAR SEPTUM: 1.78 CM (ref 0.6–1.1)
LEFT ATRIUM SIZE: 3.69 CM
LEFT INTERNAL DIMENSION IN SYSTOLE: 3.75 CM (ref 2.1–4)
LEFT VENTRICLE DIASTOLIC VOLUME INDEX: 54.82 ML/M2
LEFT VENTRICLE DIASTOLIC VOLUME: 122.25 ML
LEFT VENTRICLE MASS INDEX: 196 G/M2
LEFT VENTRICLE SYSTOLIC VOLUME INDEX: 26.9 ML/M2
LEFT VENTRICLE SYSTOLIC VOLUME: 59.97 ML
LEFT VENTRICULAR INTERNAL DIMENSION IN DIASTOLE: 5.07 CM (ref 3.5–6)
LEFT VENTRICULAR MASS: 438.16 G
LYMPHOCYTES # BLD AUTO: ABNORMAL K/UL (ref 1–4.8)
LYMPHOCYTES NFR BLD: 19 % (ref 18–48)
MCH RBC QN AUTO: 21.4 PG (ref 27–31)
MCHC RBC AUTO-ENTMCNC: 31.4 G/DL (ref 32–36)
MCV RBC AUTO: 68 FL (ref 82–98)
MONOCYTES # BLD AUTO: ABNORMAL K/UL (ref 0.3–1)
MONOCYTES NFR BLD: 5 % (ref 4–15)
NEUTROPHILS NFR BLD: 73 % (ref 38–73)
NRBC BLD-RTO: 1 /100 WBC
OHS LV EJECTION FRACTION SIMPSONS BIPLANE MOD: 5 %
PLATELET # BLD AUTO: 479 K/UL (ref 150–450)
PLATELET BLD QL SMEAR: ABNORMAL
PMV BLD AUTO: 10.3 FL (ref 9.2–12.9)
POCT GLUCOSE: 121 MG/DL (ref 70–110)
POCT GLUCOSE: 126 MG/DL (ref 70–110)
POCT GLUCOSE: 140 MG/DL (ref 70–110)
POCT GLUCOSE: 148 MG/DL (ref 70–110)
POCT GLUCOSE: 182 MG/DL (ref 70–110)
POIKILOCYTOSIS BLD QL SMEAR: SLIGHT
POTASSIUM SERPL-SCNC: 3.8 MMOL/L (ref 3.5–5.1)
PROT SERPL-MCNC: 7 G/DL (ref 6–8.4)
RBC # BLD AUTO: 3.87 M/UL (ref 4–5.4)
SODIUM SERPL-SCNC: 143 MMOL/L (ref 136–145)
TARGETS BLD QL SMEAR: ABNORMAL
WBC # BLD AUTO: 13.59 K/UL (ref 3.9–12.7)

## 2023-05-07 PROCEDURE — 25000003 PHARM REV CODE 250: Performed by: INTERNAL MEDICINE

## 2023-05-07 PROCEDURE — 82550 ASSAY OF CK (CPK): CPT | Performed by: INTERNAL MEDICINE

## 2023-05-07 PROCEDURE — 37000009 HC ANESTHESIA EA ADD 15 MINS: Performed by: SURGERY

## 2023-05-07 PROCEDURE — 25000003 PHARM REV CODE 250: Performed by: SURGERY

## 2023-05-07 PROCEDURE — 27200651 HC AIRWAY, LMA: Performed by: ANESTHESIOLOGY

## 2023-05-07 PROCEDURE — 63600175 PHARM REV CODE 636 W HCPCS: Performed by: NURSE ANESTHETIST, CERTIFIED REGISTERED

## 2023-05-07 PROCEDURE — 63600175 PHARM REV CODE 636 W HCPCS: Performed by: ANESTHESIOLOGY

## 2023-05-07 PROCEDURE — 94761 N-INVAS EAR/PLS OXIMETRY MLT: CPT

## 2023-05-07 PROCEDURE — 36000705 HC OR TIME LEV I EA ADD 15 MIN: Performed by: SURGERY

## 2023-05-07 PROCEDURE — 71000033 HC RECOVERY, INTIAL HOUR: Performed by: SURGERY

## 2023-05-07 PROCEDURE — 63600175 PHARM REV CODE 636 W HCPCS: Performed by: INTERNAL MEDICINE

## 2023-05-07 PROCEDURE — C1751 CATH, INF, PER/CENT/MIDLINE: HCPCS

## 2023-05-07 PROCEDURE — 36410 VNPNXR 3YR/> PHY/QHP DX/THER: CPT

## 2023-05-07 PROCEDURE — 25000003 PHARM REV CODE 250: Performed by: ANESTHESIOLOGY

## 2023-05-07 PROCEDURE — 85007 BL SMEAR W/DIFF WBC COUNT: CPT | Performed by: INTERNAL MEDICINE

## 2023-05-07 PROCEDURE — 25000003 PHARM REV CODE 250: Performed by: NURSE ANESTHETIST, CERTIFIED REGISTERED

## 2023-05-07 PROCEDURE — 99232 PR SUBSEQUENT HOSPITAL CARE,LEVL II: ICD-10-PCS | Mod: 25,ICN,, | Performed by: SURGERY

## 2023-05-07 PROCEDURE — 36415 COLL VENOUS BLD VENIPUNCTURE: CPT | Performed by: INTERNAL MEDICINE

## 2023-05-07 PROCEDURE — 80053 COMPREHEN METABOLIC PANEL: CPT | Performed by: INTERNAL MEDICINE

## 2023-05-07 PROCEDURE — 37000008 HC ANESTHESIA 1ST 15 MINUTES: Performed by: SURGERY

## 2023-05-07 PROCEDURE — 10061 PR DRAIN SKIN ABSCESS COMPLIC: ICD-10-PCS | Mod: ,,, | Performed by: SURGERY

## 2023-05-07 PROCEDURE — 99232 SBSQ HOSP IP/OBS MODERATE 35: CPT | Mod: 25,ICN,, | Performed by: SURGERY

## 2023-05-07 PROCEDURE — 10061 I&D ABSCESS COMP/MULTIPLE: CPT | Mod: ,,, | Performed by: SURGERY

## 2023-05-07 PROCEDURE — 21400001 HC TELEMETRY ROOM

## 2023-05-07 PROCEDURE — 85027 COMPLETE CBC AUTOMATED: CPT | Performed by: INTERNAL MEDICINE

## 2023-05-07 PROCEDURE — 36000704 HC OR TIME LEV I 1ST 15 MIN: Performed by: SURGERY

## 2023-05-07 RX ORDER — NAPROXEN SODIUM 220 MG/1
81 TABLET, FILM COATED ORAL DAILY
Status: DISCONTINUED | OUTPATIENT
Start: 2023-05-08 | End: 2023-05-16 | Stop reason: HOSPADM

## 2023-05-07 RX ORDER — PROPOFOL 10 MG/ML
VIAL (ML) INTRAVENOUS
Status: DISCONTINUED | OUTPATIENT
Start: 2023-05-07 | End: 2023-05-07

## 2023-05-07 RX ORDER — ONDANSETRON 2 MG/ML
INJECTION INTRAMUSCULAR; INTRAVENOUS
Status: DISCONTINUED | OUTPATIENT
Start: 2023-05-07 | End: 2023-05-07

## 2023-05-07 RX ORDER — SODIUM CHLORIDE, SODIUM LACTATE, POTASSIUM CHLORIDE, CALCIUM CHLORIDE 600; 310; 30; 20 MG/100ML; MG/100ML; MG/100ML; MG/100ML
INJECTION, SOLUTION INTRAVENOUS CONTINUOUS PRN
Status: DISCONTINUED | OUTPATIENT
Start: 2023-05-07 | End: 2023-05-07

## 2023-05-07 RX ORDER — HYDROMORPHONE HYDROCHLORIDE 2 MG/ML
0.2 INJECTION, SOLUTION INTRAMUSCULAR; INTRAVENOUS; SUBCUTANEOUS EVERY 5 MIN PRN
Status: DISCONTINUED | OUTPATIENT
Start: 2023-05-07 | End: 2023-05-09

## 2023-05-07 RX ORDER — LIDOCAINE HYDROCHLORIDE 20 MG/ML
INJECTION INTRAVENOUS
Status: DISCONTINUED | OUTPATIENT
Start: 2023-05-07 | End: 2023-05-07

## 2023-05-07 RX ORDER — SODIUM CHLORIDE 0.9 % (FLUSH) 0.9 %
10 SYRINGE (ML) INJECTION
Status: DISCONTINUED | OUTPATIENT
Start: 2023-05-07 | End: 2023-05-16 | Stop reason: HOSPADM

## 2023-05-07 RX ORDER — BUPIVACAINE HYDROCHLORIDE 2.5 MG/ML
INJECTION, SOLUTION EPIDURAL; INFILTRATION; INTRACAUDAL
Status: DISCONTINUED | OUTPATIENT
Start: 2023-05-07 | End: 2023-05-07 | Stop reason: HOSPADM

## 2023-05-07 RX ORDER — MIDAZOLAM HYDROCHLORIDE 1 MG/ML
INJECTION INTRAMUSCULAR; INTRAVENOUS
Status: DISCONTINUED | OUTPATIENT
Start: 2023-05-07 | End: 2023-05-07

## 2023-05-07 RX ORDER — LIDOCAINE HYDROCHLORIDE 10 MG/ML
INJECTION, SOLUTION EPIDURAL; INFILTRATION; INTRACAUDAL; PERINEURAL
Status: DISCONTINUED | OUTPATIENT
Start: 2023-05-07 | End: 2023-05-07 | Stop reason: HOSPADM

## 2023-05-07 RX ORDER — FENTANYL CITRATE 50 UG/ML
INJECTION, SOLUTION INTRAMUSCULAR; INTRAVENOUS
Status: DISCONTINUED | OUTPATIENT
Start: 2023-05-07 | End: 2023-05-07

## 2023-05-07 RX ORDER — CLOPIDOGREL BISULFATE 75 MG/1
75 TABLET ORAL DAILY
Status: DISCONTINUED | OUTPATIENT
Start: 2023-05-08 | End: 2023-05-16 | Stop reason: HOSPADM

## 2023-05-07 RX ORDER — OXYCODONE AND ACETAMINOPHEN 5; 325 MG/1; MG/1
1 TABLET ORAL
Status: DISCONTINUED | OUTPATIENT
Start: 2023-05-07 | End: 2023-05-13

## 2023-05-07 RX ADMIN — FENTANYL CITRATE 50 MCG: 50 INJECTION, SOLUTION INTRAMUSCULAR; INTRAVENOUS at 11:05

## 2023-05-07 RX ADMIN — SODIUM CHLORIDE, SODIUM LACTATE, POTASSIUM CHLORIDE, AND CALCIUM CHLORIDE: 600; 310; 30; 20 INJECTION, SOLUTION INTRAVENOUS at 11:05

## 2023-05-07 RX ADMIN — HYDROMORPHONE HYDROCHLORIDE 0.2 MG: 2 INJECTION INTRAMUSCULAR; INTRAVENOUS; SUBCUTANEOUS at 12:05

## 2023-05-07 RX ADMIN — HEPARIN SODIUM 5000 UNITS: 5000 INJECTION INTRAVENOUS; SUBCUTANEOUS at 08:05

## 2023-05-07 RX ADMIN — FENTANYL CITRATE 100 MCG: 50 INJECTION, SOLUTION INTRAMUSCULAR; INTRAVENOUS at 11:05

## 2023-05-07 RX ADMIN — LIDOCAINE HYDROCHLORIDE 100 MG: 20 INJECTION INTRAVENOUS at 11:05

## 2023-05-07 RX ADMIN — ONDANSETRON 4 MG: 2 INJECTION INTRAMUSCULAR; INTRAVENOUS at 11:05

## 2023-05-07 RX ADMIN — CLINDAMYCIN PHOSPHATE 600 MG: 600 INJECTION, SOLUTION INTRAVENOUS at 12:05

## 2023-05-07 RX ADMIN — MIDAZOLAM HYDROCHLORIDE 2 MG: 1 INJECTION, SOLUTION INTRAMUSCULAR; INTRAVENOUS at 11:05

## 2023-05-07 RX ADMIN — AMLODIPINE BESYLATE 5 MG: 5 TABLET ORAL at 08:05

## 2023-05-07 RX ADMIN — HYDRALAZINE HYDROCHLORIDE 10 MG: 20 INJECTION, SOLUTION INTRAMUSCULAR; INTRAVENOUS at 08:05

## 2023-05-07 RX ADMIN — HEPARIN SODIUM 5000 UNITS: 5000 INJECTION INTRAVENOUS; SUBCUTANEOUS at 09:05

## 2023-05-07 RX ADMIN — SODIUM CHLORIDE, POTASSIUM CHLORIDE, SODIUM LACTATE AND CALCIUM CHLORIDE: 600; 310; 30; 20 INJECTION, SOLUTION INTRAVENOUS at 12:05

## 2023-05-07 RX ADMIN — SODIUM CHLORIDE, SODIUM LACTATE, POTASSIUM CHLORIDE, AND CALCIUM CHLORIDE: 600; 310; 30; 20 INJECTION, SOLUTION INTRAVENOUS at 12:05

## 2023-05-07 RX ADMIN — CLINDAMYCIN PHOSPHATE 600 MG: 600 INJECTION, SOLUTION INTRAVENOUS at 03:05

## 2023-05-07 RX ADMIN — OXYCODONE HYDROCHLORIDE AND ACETAMINOPHEN 1 TABLET: 5; 325 TABLET ORAL at 12:05

## 2023-05-07 RX ADMIN — PROPOFOL 100 MG: 10 INJECTION, EMULSION INTRAVENOUS at 11:05

## 2023-05-07 RX ADMIN — CLINDAMYCIN PHOSPHATE 600 MG: 600 INJECTION, SOLUTION INTRAVENOUS at 09:05

## 2023-05-07 RX ADMIN — CLINDAMYCIN PHOSPHATE 600 MG: 600 INJECTION, SOLUTION INTRAVENOUS at 11:05

## 2023-05-07 RX ADMIN — ATORVASTATIN CALCIUM 80 MG: 40 TABLET, FILM COATED ORAL at 09:05

## 2023-05-07 NOTE — ANESTHESIA POSTPROCEDURE EVALUATION
Anesthesia Post Evaluation    Patient: Kenia Roper    Procedure(s) Performed: Procedure(s) (LRB):  INCISION AND DRAINAGE, ABSCESS (N/A)    Final Anesthesia Type: general      Patient location during evaluation: PACU  Patient participation: Yes- Able to Participate  Level of consciousness: awake and alert  Post-procedure vital signs: reviewed and stable  Pain management: adequate  Airway patency: patent    PONV status at discharge: No PONV  Anesthetic complications: no      Cardiovascular status: blood pressure returned to baseline  Respiratory status: unassisted  Hydration status: euvolemic  Follow-up not needed.          Vitals Value Taken Time   /77 05/07/23 1219   Temp 36.9 °C (98.4 °F) 05/07/23 1207   Pulse 68 05/07/23 1223   Resp 21 05/07/23 1223   SpO2 100 % 05/07/23 1223   Vitals shown include unvalidated device data.      No case tracking events are documented in the log.      Pain/Maria Elena Score: No data recorded

## 2023-05-07 NOTE — ASSESSMENT & PLAN NOTE
48yo F with large abscess in upper back/T-spine area    - Drainage in OR today  - Continue IV antibiotics  - Local wound care following surgery with wet to dry dressing changes

## 2023-05-07 NOTE — PROGRESS NOTES
Patient to OR via bed, vital signs and CBG taken prior to leaving and IV fluids were salined locked. No apparent distress noted.

## 2023-05-07 NOTE — PROGRESS NOTES
[facility] - Telemetry ([facility])  General Surgery  Progress Note    Subjective:     History of Present Illness:  58yo F who is admitted to the [facility] medicine service for evaluation of altered mental status, CVA and being found unresponsive at home.  Patient was reportedly found down at home covered in feces and urine with her last known well check around 2 days before this.  Has a known history of CVA and type 2 DM.  Was found to be septic upon presentation with a leukocytosis of 20k and elevated creatinine at 3.5.  She is admitted to the [facility] medical service for evaluation of altered mental status and neurologic changes along with workup of her sepsis.  She was found to have a large middle back abscess in her upper back which was actively draining purulence upon admission.  Wound care was consulted and thought she may need surgical debridement.  General surgery was consulted for evaluation.  She is a pending MRI T-spine.  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      Post-Op Info:  Procedure(s) (LRB):  INCISION AND DRAINAGE, ABSCESS (N/A)   Day of Surgery     Interval History: back abscess continuing to drain, pain controlled    Medications:  Continuous Infusions:   lactated ringers 75 mL/hr at 05/07/23 0738     Scheduled Meds:   amLODIPine  5 mg Oral Daily    atorvastatin  80 mg Oral QHS    clindamycin (CLEOCIN) IVPB  600 mg Intravenous Q8H    heparin (porcine)  5,000 Units Subcutaneous Q12H     PRN Meds:acetaminophen, dextrose 10%, dextrose 10%, dextrose, dextrose, glucagon (human recombinant), hydrALAZINE, insulin aspart U-100, ondansetron, sodium chloride 0.9%     Review of patient's allergies indicates:  No Known Allergies  Objective:     Vital Signs (Most Recent):  Temp: 98.2 °F (36.8 °C) (05/07/23 0744)  Pulse: 73 (05/07/23 0902)  Resp: 18 (05/07/23 0744)  BP: (!) 187/82 (05/07/23 0744)  SpO2: 98 % (05/07/23 0744) Vital Signs (24h Range):  Temp:  [97.9 °F (36.6  °C)-98.8 °F (37.1 °C)] 98.2 °F (36.8 °C)  Pulse:  [67-85] 73  Resp:  [18-28] 18  SpO2:  [98 %-99 %] 98 %  BP: (136-197)/(66-88) 187/82     Weight: 127.3 kg (280 lb 10.3 oz)  Body mass index is 48.17 kg/m².    Intake/Output - Last 3 Shifts         05/05 0700 05/06 0659 05/06 0700 05/07 0659 05/07 0700  05/08 0659    P.O. 480 580     I.V. (mL/kg)   312 (2.5)    IV Piggyback 204.4  204.3    Total Intake(mL/kg) 684.4 (5.5) 580 (4.6) 516.3 (4.1)    Urine (mL/kg/hr) 500 (0.2)      Total Output 500      Net +184.4 +580 +516.3           Urine Occurrence  2 x     Stool Occurrence  2 x             Physical Exam  Vitals reviewed.   Constitutional:       Appearance: She is well-developed. She is obese.   HENT:      Head: Normocephalic and atraumatic.   Eyes:      Conjunctiva/sclera: Conjunctivae normal.   Neck:      Thyroid: No thyromegaly.   Cardiovascular:      Rate and Rhythm: Normal rate.   Pulmonary:      Effort: Pulmonary effort is normal. No respiratory distress.   Abdominal:      General: There is no distension.      Palpations: Abdomen is soft. There is no mass.      Tenderness: There is no abdominal tenderness.   Skin:     Comments: Midline T-spine with actively purulent draining area of fluctuance and induration consistent with abscess. Probed with cotton tipped applicator and abscess cavity is at least 5cm deep and measures around 8cm x 9cm.    Neurological:      Mental Status: She is alert.        Significant Labs:  I have reviewed all pertinent lab results within the past 24 hours.  CBC:   Recent Labs   Lab 05/07/23  0556   WBC 13.59*   RBC 3.87*   HGB 8.3*   HCT 26.4*   *   MCV 68*   MCH 21.4*   MCHC 31.4*       BMP:   Recent Labs   Lab 05/06/23  0615 05/07/23  0555   * 131*    143   K 3.3* 3.8    108   CO2 23 22*   BUN 49* 38*   CREATININE 1.7* 1.4   CALCIUM 8.8 8.8   MG 2.1  --          Significant Diagnostics:  I have reviewed all pertinent imaging results/findings within the past  24 hours.    Impression:     Imaging degraded related to large body habitus.  As visualized no overt bony marrow edema.     Discogenic spondylitic indentation upon the ventral thecal sac lower cervical spine incompletely evaluated.  No overt compromise of the thoracic canal.  Thoracic cord not well discriminated but without overt pathology.     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  No overt extension into the spinal canal.    Assessment/Plan:     * Acute metabolic encephalopathy  Care per primary team    Back abscess  48yo F with large abscess in upper back/T-spine area    - Drainage in OR today  - Continue IV antibiotics  - Local wound care following surgery with wet to dry dressing changes    High anion gap metabolic acidosis  Care per primary team    Type 2 diabetes mellitus with hyperglycemia  Care per primary team    Elevated troponin  Care per primary team    LUISA (acute kidney injury)  Care per primary team/nephrology    Severe sepsis  Care per primary team    Morbid obesity with BMI of 45.0-49.9, adult  Care per primary team        Bertin Spivey MD  General Surgery  [facility] - Telemetry ([facility])

## 2023-05-07 NOTE — OP NOTE
[facility])  Surgery Department  Operative Note    SUMMARY     Date of Procedure: 5/7/2023     Procedure: Procedure(s) (LRB):  INCISION AND DRAINAGE, ABSCESS (N/A)     Surgeon(s) and Role:     * Bertin Spivey MD - Primary    Assisting Surgeon: None    Pre-Operative Diagnosis: Back abscess [L02.212]    Post-Operative Diagnosis: Post-Op Diagnosis Codes:     * Back abscess [L02.212]    Anesthesia: General    Operative Findings (including complications, if any):  Incision drainage back abscess    Description of Technical Procedures:  10 cm x 8 cm back abscess    Estimated Blood Loss (EBL): 50cc           Implants: * No implants in log *    Specimens:   Specimen (24h ago, onward)      None                    Condition: Good    Disposition: PACU - hemodynamically stable.    Procedure in Detail:  Patient was brought to the OR and underwent general anesthesia.  She was prepped and draped in the usual sterile fashion the right lateral decubitus position.  Incision was made over the draining tracts overlying the abscess in the middle of the back.  A large abscess cavity with mild amount of purulence and necrosis was encountered.  Loculations were bluntly broken up.  The cavity tracked proximally 10 cm laterally to the right.  A counter incision was made.  The wound was thoroughly irrigated and necrotic subcutaneous tissues were debrided using Bovie electrocautery.  The wound was irrigated until clear.  Hemostasis was achieved.  Local anesthetic was injected.  A Penrose drain was placed between the 2 incision sites and secured with a 2-0 silk suture.  The wound was packed with wet-to-dry Kerlix gauze followed by a pressure dressing.  The patient tolerated procedure in stable and satisfactory condition was transferred to recovery postoperatively.

## 2023-05-07 NOTE — PLAN OF CARE
Discussed Plan of Care with patient and verbalized understanding - Patient remains AAOx2 - remains free of falls, accidents and trauma during the day shift. Bed is in the low position and the call light is within reach. S/P I/D Abscess to back, penrose drain placed during surgery with wet-dry dressing and pressure dressing.  Will continue to monitor

## 2023-05-07 NOTE — ASSESSMENT & PLAN NOTE
Patient with acute kidney injury likely due to IVVD/dehydration and acute tubular necrosis LUISA is currently improving. Labs reviewed- Renal function/electrolytes with Estimated Creatinine Clearance: 65.6 mL/min (based on SCr of 1.4 mg/dL). according to latest data. Monitor urine output and serial BMP and adjust therapy as needed. Avoid nephrotoxins and renally dose meds for GFR listed above.     creatinine elevated 3.5 on admission, downtrending with IV fluid administration   Nephrology consulted and have signed off   IV fluid discontinued given history of CHF noted on care everywhere and patient is able to tolerate oral intake  Baseline renal function unclear

## 2023-05-07 NOTE — ASSESSMENT & PLAN NOTE
Body mass index is 48.17 kg/m². Morbid obesity complicates all aspects of disease management from diagnostic modalities to treatment. Weight loss encouraged and health benefits explained to patient.

## 2023-05-07 NOTE — TRANSFER OF CARE
"Anesthesia Transfer of Care Note    Patient: Kenia Roper    Procedure(s) Performed: Procedure(s) (LRB):  INCISION AND DRAINAGE, ABSCESS (N/A)    Patient location: PACU    Anesthesia Type: general    Transport from OR: Transported from OR on room air with adequate spontaneous ventilation    Post pain: adequate analgesia    Post assessment: no apparent anesthetic complications and tolerated procedure well    Post vital signs: stable    Level of consciousness: awake    Nausea/Vomiting: no nausea/vomiting    Complications: none    Transfer of care protocol was followed      Last vitals:   Visit Vitals  BP (!) 187/82 (BP Location: Left arm, Patient Position: Lying)   Pulse 74   Temp 36.9 °C (98.4 °F) (Temporal)   Resp (!) 21   Ht 5' 4" (1.626 m)   Wt 127.3 kg (280 lb 10.3 oz)   LMP  (LMP Unknown)   SpO2 100%   Breastfeeding No   BMI 48.17 kg/m²     "

## 2023-05-07 NOTE — ASSESSMENT & PLAN NOTE
Elevation likely secondary to muscle breakdown as patient was found down at home for unknown period of time  Trended down with IV fluid

## 2023-05-07 NOTE — SUBJECTIVE & OBJECTIVE
Interval History: No acute events overnight.  Seen and examined without any family present after she had undergone I and D this morning.  Patient reported some pain on her back, however does not think she needs any pain medication at this time.  Denies any complaints except feeling hungry.    Review of Systems  Objective:     Vital Signs (Most Recent):  Temp: 96.7 °F (35.9 °C) (05/07/23 1513)  Pulse: 70 (05/07/23 1513)  Resp: 20 (05/07/23 1513)  BP: (!) 121/57 (05/07/23 1513)  SpO2: 95 % (05/07/23 1513) Vital Signs (24h Range):  Temp:  [96.7 °F (35.9 °C)-98.8 °F (37.1 °C)] 96.7 °F (35.9 °C)  Pulse:  [68-85] 70  Resp:  [15-28] 20  SpO2:  [90 %-100 %] 95 %  BP: (121-197)/(57-88) 121/57     Weight: 127.3 kg (280 lb 10.3 oz)  Body mass index is 48.17 kg/m².    Intake/Output Summary (Last 24 hours) at 5/7/2023 1742  Last data filed at 5/7/2023 1215  Gross per 24 hour   Intake 1696.3 ml   Output --   Net 1696.3 ml         Physical Exam  Vitals and nursing note reviewed.   Constitutional:       General: She is not in acute distress.     Appearance: She is morbidly obese.   HENT:      Head: Normocephalic and atraumatic.      Right Ear: External ear normal.      Left Ear: External ear normal.      Mouth/Throat:      Mouth: Mucous membranes are moist.   Eyes:      Pupils: Pupils are equal, round, and reactive to light.   Cardiovascular:      Rate and Rhythm: Normal rate and regular rhythm.   Pulmonary:      Effort: Pulmonary effort is normal. No accessory muscle usage or respiratory distress.      Breath sounds: No wheezing.   Abdominal:      General: Bowel sounds are normal. There is no distension.      Palpations: Abdomen is soft.      Tenderness: There is no abdominal tenderness. There is no guarding or rebound.   Musculoskeletal:      Cervical back: Neck supple.      Comments: Moving both lower extremities in bed   Skin:     General: Skin is warm and dry.   Neurological:      Mental Status: She is alert and oriented to  person, place, and time.   Psychiatric:         Mood and Affect: Affect is flat.           Significant Labs: All pertinent labs within the past 24 hours have been reviewed.  CBC:   Recent Labs   Lab 05/06/23  0615 05/07/23  0556   WBC 13.93* 13.59*   HGB 8.0* 8.3*   HCT 25.4* 26.4*   * 479*     CMP:   Recent Labs   Lab 05/06/23  0615 05/07/23  0555    143   K 3.3* 3.8    108   CO2 23 22*   * 131*   BUN 49* 38*   CREATININE 1.7* 1.4   CALCIUM 8.8 8.8   PROT 7.2 7.0   ALBUMIN 2.1* 2.2*   BILITOT 0.9 0.6   ALKPHOS 105 99   AST 53* 42*   ALT 64* 47*   ANIONGAP 11 13       Significant Imaging: I have reviewed all pertinent imaging results/findings within the past 24 hours.

## 2023-05-07 NOTE — ASSESSMENT & PLAN NOTE
This patient does have evidence of infective focus  My overall impression is sepsis.  Source: Skin and Soft Tissue (location Back)  Antibiotics given-   Antibiotics (72h ago, onward)    Start     Stop Route Frequency Ordered    05/04/23 0600  clindamycin in D5W 600 mg/50 mL IVPB 600 mg         -- IV Every 8 hours (non-standard times) 05/04/23 0454        Latest lactate reviewed-  No results for input(s): LACTATE in the last 72 hours.  Organ dysfunction indicated by Acute kidney injury and Acute liver injury    Fluid challenge Ideal Body Weight- The patient's ideal body weight is Ideal body weight: 54.7 kg (120 lb 9.5 oz) which will be used to calculate fluid bolus of 30 ml/kg for treatment of septic shock.      Post- resuscitation assessment No - Post resuscitation assessment not needed       Will Not start Pressors- Levophed for MAP of 65  Source control achieved by:  IV antibiotics.      -afebrile.  HR .  WBC 35703, 0% bands, elevated procalcitonin on admission  -Leukocytosis downtrending   -possible multiple etiologies of infectious process secondary to UTI and back abscess  -blood cultures NGTD, urine culture in process, will follow up  -continue empiric IV clindamycin.  Zosyn discontinued given urine culture showed no growth

## 2023-05-07 NOTE — SUBJECTIVE & OBJECTIVE
Interval History: back abscess continuing to drain, pain controlled    Medications:  Continuous Infusions:   lactated ringers 75 mL/hr at 05/07/23 0738     Scheduled Meds:   amLODIPine  5 mg Oral Daily    atorvastatin  80 mg Oral QHS    clindamycin (CLEOCIN) IVPB  600 mg Intravenous Q8H    heparin (porcine)  5,000 Units Subcutaneous Q12H     PRN Meds:acetaminophen, dextrose 10%, dextrose 10%, dextrose, dextrose, glucagon (human recombinant), hydrALAZINE, insulin aspart U-100, ondansetron, sodium chloride 0.9%     Review of patient's allergies indicates:  No Known Allergies  Objective:     Vital Signs (Most Recent):  Temp: 98.2 °F (36.8 °C) (05/07/23 0744)  Pulse: 73 (05/07/23 0902)  Resp: 18 (05/07/23 0744)  BP: (!) 187/82 (05/07/23 0744)  SpO2: 98 % (05/07/23 0744) Vital Signs (24h Range):  Temp:  [97.9 °F (36.6 °C)-98.8 °F (37.1 °C)] 98.2 °F (36.8 °C)  Pulse:  [67-85] 73  Resp:  [18-28] 18  SpO2:  [98 %-99 %] 98 %  BP: (136-197)/(66-88) 187/82     Weight: 127.3 kg (280 lb 10.3 oz)  Body mass index is 48.17 kg/m².    Intake/Output - Last 3 Shifts         05/05 0700 05/06 0659 05/06 0700 05/07 0659 05/07 0700 05/08 0659    P.O. 480 580     I.V. (mL/kg)   312 (2.5)    IV Piggyback 204.4  204.3    Total Intake(mL/kg) 684.4 (5.5) 580 (4.6) 516.3 (4.1)    Urine (mL/kg/hr) 500 (0.2)      Total Output 500      Net +184.4 +580 +516.3           Urine Occurrence  2 x     Stool Occurrence  2 x              Physical Exam  Vitals reviewed.   Constitutional:       Appearance: She is well-developed. She is obese.   HENT:      Head: Normocephalic and atraumatic.   Eyes:      Conjunctiva/sclera: Conjunctivae normal.   Neck:      Thyroid: No thyromegaly.   Cardiovascular:      Rate and Rhythm: Normal rate.   Pulmonary:      Effort: Pulmonary effort is normal. No respiratory distress.   Abdominal:      General: There is no distension.      Palpations: Abdomen is soft. There is no mass.      Tenderness: There is no abdominal  tenderness.   Skin:     Comments: Midline T-spine with actively purulent draining area of fluctuance and induration consistent with abscess. Probed with cotton tipped applicator and abscess cavity is at least 5cm deep and measures around 8cm x 9cm.    Neurological:      Mental Status: She is alert.        Significant Labs:  I have reviewed all pertinent lab results within the past 24 hours.  CBC:   Recent Labs   Lab 05/07/23  0556   WBC 13.59*   RBC 3.87*   HGB 8.3*   HCT 26.4*   *   MCV 68*   MCH 21.4*   MCHC 31.4*       BMP:   Recent Labs   Lab 05/06/23  0615 05/07/23  0555   * 131*    143   K 3.3* 3.8    108   CO2 23 22*   BUN 49* 38*   CREATININE 1.7* 1.4   CALCIUM 8.8 8.8   MG 2.1  --          Significant Diagnostics:  I have reviewed all pertinent imaging results/findings within the past 24 hours.    Impression:     Imaging degraded related to large body habitus.  As visualized no overt bony marrow edema.     Discogenic spondylitic indentation upon the ventral thecal sac lower cervical spine incompletely evaluated.  No overt compromise of the thoracic canal.  Thoracic cord not well discriminated but without overt pathology.     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  No overt extension into the spinal canal.

## 2023-05-07 NOTE — ASSESSMENT & PLAN NOTE
Patient found to have drainage of purulence material from her back  CT of thoracic spine confirms abscess, MRI shows no spine involvement  General surgery consulted, recommended neurosurgery evaluation  No surgical intervention recommended  S/p I and D of 10 x 8 cm back abscess by General surgery on 05/07/2023  Wound care  Continue clindamycin

## 2023-05-07 NOTE — ASSESSMENT & PLAN NOTE
Patient noted to have multiple areas of infarction on both hemispheres concerning for embolic phenomena  Carotid ultrasound showed no significant stenosis     Antithrombotics for secondary stroke prevention: Antiplatelets: Aspirin: 81 mg daily  Clopidogrel: 75 mg daily    Statins for secondary stroke prevention and hyperlipidemia, if present:   Statins: Atorvastatin- 80 mg daily    Aggressive risk factor modification: HTN, DM, Obesity     Rehab efforts: The patient has been evaluated by a stroke team provider and the therapy needs have been fully considered based off the presenting complaints and exam findings. The following therapy evaluations are needed: PT evaluate and treat, OT evaluate and treat, SLP evaluate and treat    Diagnostics ordered/pending: MRA head to assess vasculature, TTE to assess cardiac function/status , TSH to assess thyroid function    VTE prophylaxis: Enoxaparin 40 mg SQ every 24 hours    BP parameters: Infarct: No intervention, SBP <220     Patient is past permissive hypertension period.  Optimize BP  Patient will need cardiac monitoring given embolic nature of stroke with no evidence of intracardiac shunting    PT/OT recommended rehab placement on discharge   SLP recommends dysphagia soft diet with thin liquid

## 2023-05-07 NOTE — ANESTHESIA PROCEDURE NOTES
Intubation    Date/Time: 5/7/2023 11:24 AM  Performed by: Mateus Dover CRNA  Authorized by: José Antonio Reddy MD     Intubation:     Induction:  Intravenous    Intubated:  Postinduction    Mask Ventilation:  Not attempted    Attempts:  1    Attempted By:  CRNA    Difficult Airway Encountered?: No      Complications:  None    Airway Device:  Supraglottic airway/LMA    Airway Device Size:  4.0    Style/Cuff Inflation:  Cuffed (inflated to minimal occlusive pressure)    Secured at:  The lips    Placement Verified By:  Capnometry    Complicating Factors:  None    Findings Post-Intubation:  BS equal bilateral and atraumatic/condition of teeth unchanged

## 2023-05-07 NOTE — PROGRESS NOTES
[facility] Medicine  Progress Note    Patient Name: Kenia Roper  MRN: [id number]  Patient Class: IP- Inpatient   Admission Date: 5/3/2023  Length of Stay: 3 days  Attending Physician: Heydi Cardozo DO  Primary Care Provider: Ross Perdomo MD        Subjective:     Principal Problem:Acute metabolic encephalopathy        HPI:  Ms. Roper is a 57-year-old morbidly obese  female, was brought in to the ED in an unresponsive state.  No family at the bedside, unable to obtain any information from patient.  No information per Care everywhere.  Most of the information obtained from ED staff.  Per report, patient was found down at home, covered in feces and urine.  Last known well two days ago.  No report of fever or chills.  Known history of CVA and type 2 diabetes mellitus.  In the ED she is afebrile, mildly tachycardic HR .  WBC elevated 20,000. BUN 60, creatinine 3.5 (unknown baseline).  Elevated LFTs in the 400s.  Troponin elevated 0.259, unknown if she has chest pain or SOB.  EKG NSR with no ST T wave changes.  Hemodynamically stable.  Presumed severe sepsis with LUISA/AMS, she received 30 mL/kilogram bolus in the ED along with IV Zosyn empirically.  Cultures obtained.  UDS positive for opiates.  CT head is negative for acute intracranial pathology.  CXR negative for infectious process.    Admitting diagnosis:  Severe sepsis.  Acute encephalopathy (TIA versus uremia).  LUISA.  Elevated troponin.  Elevated LFTs.      Overview/Hospital Course:  MRI brain revealed bilateral small scattered acute infarcts favoring embolic type infarcts.  Neurology consulted    Upon further examination, patient was found to have a large middle back abscess with purulent drainage.  CT of thoracic spine done without contrast showed subcutaneous on organized air-fluid collection dorsally consistent with clinical abscess or gas producing infection with no overt underlying bony change or  involvement of the spinal canal.  Patient started on empiric antibiotics with Zosyn and clindamycin.  General surgery consulted and recommended MRI of thoracic spine and Neurosurgery consultation.  MRI of thoracic spine showed no overt bony marrow edema and no overt compromise of the thoracic canal, subcutaneous abscess or phlegmon with cutaneous fistula with no overt extension into the spinal canal.    Neurosurgery recommends no neurosurgical intervention as there is no evidence of cord compromise.  General surgery performed I&D of back abscess on 05/07/2023.  Blood cultures NGTD    Echocardiogram showed LVEF 55%, normal left ventricular diastolic function, no evidence of intracardiac shunting.  She will need cardiac monitoring to rule out AFib    Mentation improved, patient awake, alert, oriented although notable to have lower extremity weakness and difficulty with word finding.  Neurology consulted and recommended MRA brain which showed degraded image, scattered bilateral foci acute ischemia supratentorial similar to MRI brain, apparent relative diminished flow of left MCA which may be partially occlusive, otherwise no large vessel occlusion or critical stenosis noted.    PT/OT/SLP recommended rehab placement on discharge.  SLP also recommends patient be placed on soft and bite size diet (IDDSI level 6), with thin liquids    Case management working on placement for discharge      Interval History: No acute events overnight.  Seen and examined without any family present after she had undergone I and D this morning.  Patient reported some pain on her back, however does not think she needs any pain medication at this time.  Denies any complaints except feeling hungry.    Review of Systems  Objective:     Vital Signs (Most Recent):  Temp: 96.7 °F (35.9 °C) (05/07/23 1513)  Pulse: 70 (05/07/23 1513)  Resp: 20 (05/07/23 1513)  BP: (!) 121/57 (05/07/23 1513)  SpO2: 95 % (05/07/23 1513) Vital Signs (24h Range):  Temp:   [96.7 °F (35.9 °C)-98.8 °F (37.1 °C)] 96.7 °F (35.9 °C)  Pulse:  [68-85] 70  Resp:  [15-28] 20  SpO2:  [90 %-100 %] 95 %  BP: (121-197)/(57-88) 121/57     Weight: 127.3 kg (280 lb 10.3 oz)  Body mass index is 48.17 kg/m².    Intake/Output Summary (Last 24 hours) at 5/7/2023 1742  Last data filed at 5/7/2023 1215  Gross per 24 hour   Intake 1696.3 ml   Output --   Net 1696.3 ml         Physical Exam  Vitals and nursing note reviewed.   Constitutional:       General: She is not in acute distress.     Appearance: She is morbidly obese.   HENT:      Head: Normocephalic and atraumatic.      Right Ear: External ear normal.      Left Ear: External ear normal.      Mouth/Throat:      Mouth: Mucous membranes are moist.   Eyes:      Pupils: Pupils are equal, round, and reactive to light.   Cardiovascular:      Rate and Rhythm: Normal rate and regular rhythm.   Pulmonary:      Effort: Pulmonary effort is normal. No accessory muscle usage or respiratory distress.      Breath sounds: No wheezing.   Abdominal:      General: Bowel sounds are normal. There is no distension.      Palpations: Abdomen is soft.      Tenderness: There is no abdominal tenderness. There is no guarding or rebound.   Musculoskeletal:      Cervical back: Neck supple.      Comments: Moving both lower extremities in bed   Skin:     General: Skin is warm and dry.   Neurological:      Mental Status: She is alert and oriented to person, place, and time.   Psychiatric:         Mood and Affect: Affect is flat.           Significant Labs: All pertinent labs within the past 24 hours have been reviewed.  CBC:   Recent Labs   Lab 05/06/23  0615 05/07/23  0556   WBC 13.93* 13.59*   HGB 8.0* 8.3*   HCT 25.4* 26.4*   * 479*     CMP:   Recent Labs   Lab 05/06/23  0615 05/07/23  0555    143   K 3.3* 3.8    108   CO2 23 22*   * 131*   BUN 49* 38*   CREATININE 1.7* 1.4   CALCIUM 8.8 8.8   PROT 7.2 7.0   ALBUMIN 2.1* 2.2*   BILITOT 0.9 0.6   ALKPHOS  105 99   AST 53* 42*   ALT 64* 47*   ANIONGAP 11 13       Significant Imaging: I have reviewed all pertinent imaging results/findings within the past 24 hours.      Assessment/Plan:      * Acute metabolic encephalopathy  Patient found down at home, last well-known two days prior to ED presentation  History of CVA, lives at home with family and apparently able to participate in ADLs including babysitting her grandchild  CT head negative for acute intracranial pathology, however MRI brain revealed bilateral small acute infarcts consistent with embolic stroke  Also found to have back abscess on spine imaging  Metabolic encephalopathy noted on admission apparently multifactorial in etiology secondary to stroke and infectious process   Mentation improved    Elevated CPK  Elevation likely secondary to muscle breakdown as patient was found down at home for unknown period of time  Trended down with IV fluid    Cerebrovascular accident (CVA) due to embolism  Patient noted to have multiple areas of infarction on both hemispheres concerning for embolic phenomena  Carotid ultrasound showed no significant stenosis     Antithrombotics for secondary stroke prevention: Antiplatelets: Aspirin: 81 mg daily  Clopidogrel: 75 mg daily    Statins for secondary stroke prevention and hyperlipidemia, if present:   Statins: Atorvastatin- 80 mg daily    Aggressive risk factor modification: HTN, DM, Obesity     Rehab efforts: The patient has been evaluated by a stroke team provider and the therapy needs have been fully considered based off the presenting complaints and exam findings. The following therapy evaluations are needed: PT evaluate and treat, OT evaluate and treat, SLP evaluate and treat    Diagnostics ordered/pending: MRA head to assess vasculature, TTE to assess cardiac function/status , TSH to assess thyroid function    VTE prophylaxis: Enoxaparin 40 mg SQ every 24 hours    BP parameters: Infarct: No intervention, SBP <220      Patient is past permissive hypertension period.  Optimize BP  Patient will need cardiac monitoring given embolic nature of stroke with no evidence of intracardiac shunting    PT/OT recommended rehab placement on discharge   SLP recommends dysphagia soft diet with thin liquid    Back abscess  Patient found to have drainage of purulence material from her back  CT of thoracic spine confirms abscess, MRI shows no spine involvement  General surgery consulted, recommended neurosurgery evaluation  No surgical intervention recommended  S/p I and D of 10 x 8 cm back abscess by General surgery on 05/07/2023  Wound care  Continue clindamycin      High anion gap metabolic acidosis  Resolved with IV fluids in setting of LUISA versus LUISA superimposed on CKD  Monitor      Elevated LFTs  , , .  Total bilirubin 1.3 on arrival  Acute hepatitis panel negative  Abdominal ultrasound shows no acute abnormality  LFTs downtrending   Continue to monitor      Type 2 diabetes mellitus with hyperglycemia  Blood sugar mildly elevated 166 on admission  Hemoglobin A1c 7.1    Accu-Cheks with SSI  Diabetic diet  Optimize diabetic regimen in setting of acute embolic stroke  Unclear if patient is on any meds for diabetes outpatient    Elevated troponin  Troponin elevated 0.25 on admission, in the setting of elevated creatinine 3.5.  Repeat troponin remained flat  EKG NSR with no ST or T-wave changes.    Likely demand ischemia in setting of infectious process and acute embolic stroke  Although patient initially encephalopathic, now that mentation has improved denies any chest pain    LUISA (acute kidney injury)  Patient with acute kidney injury likely due to IVVD/dehydration and acute tubular necrosis LUISA is currently improving. Labs reviewed- Renal function/electrolytes with Estimated Creatinine Clearance: 65.6 mL/min (based on SCr of 1.4 mg/dL). according to latest data. Monitor urine output and serial BMP and adjust therapy as  needed. Avoid nephrotoxins and renally dose meds for GFR listed above.     creatinine elevated 3.5 on admission, downtrending with IV fluid administration   Nephrology consulted and have signed off   IV fluid discontinued given history of CHF noted on care everywhere and patient is able to tolerate oral intake  Baseline renal function unclear    Severe sepsis  This patient does have evidence of infective focus  My overall impression is sepsis.  Source: Skin and Soft Tissue (location Back)  Antibiotics given-   Antibiotics (72h ago, onward)    Start     Stop Route Frequency Ordered    05/04/23 0600  clindamycin in D5W 600 mg/50 mL IVPB 600 mg         -- IV Every 8 hours (non-standard times) 05/04/23 0454        Latest lactate reviewed-  No results for input(s): LACTATE in the last 72 hours.  Organ dysfunction indicated by Acute kidney injury and Acute liver injury    Fluid challenge Ideal Body Weight- The patient's ideal body weight is Ideal body weight: 54.7 kg (120 lb 9.5 oz) which will be used to calculate fluid bolus of 30 ml/kg for treatment of septic shock.      Post- resuscitation assessment No - Post resuscitation assessment not needed       Will Not start Pressors- Levophed for MAP of 65  Source control achieved by:  IV antibiotics.      -afebrile.  HR .  WBC 04471, 0% bands, elevated procalcitonin on admission  -Leukocytosis downtrending   -possible multiple etiologies of infectious process secondary to UTI and back abscess  -blood cultures NGTD, urine culture in process, will follow up  -continue empiric IV clindamycin.  Zosyn discontinued given urine culture showed no growth    Morbid obesity with BMI of 45.0-49.9, adult  Body mass index is 48.17 kg/m². Morbid obesity complicates all aspects of disease management from diagnostic modalities to treatment. Weight loss encouraged and health benefits explained to patient.         VTE Risk Mitigation (From admission, onward)         Ordered     heparin  (porcine) injection 5,000 Units  Every 12 hours         05/04/23 0456     IP VTE HIGH RISK PATIENT  Once         05/04/23 0456     Place sequential compression device  Until discontinued         05/04/23 0456     Place sequential compression device  Until discontinued         05/04/23 0043                Discharge Planning   CRISPIN:      Code Status: Full Code   Is the patient medically ready for discharge?:     Reason for patient still in hospital (select all that apply): Patient trending condition, Treatment and Pending disposition  Discharge Plan A: Rehab                  Heydi Cardozo DO  Department of Hospital Medicine   '[facility] ([facility])

## 2023-05-07 NOTE — OR NURSING
Transported patient back to room 207 with portable cardiac monitor from floor.  Also oxygen tank providing 2L per nasal cannula.

## 2023-05-07 NOTE — ANESTHESIA PREPROCEDURE EVALUATION
05/07/2023  Kenia Roper is a 47 y.o., female.      Pre-op Assessment    I have reviewed the Patient Summary Reports.     I have reviewed the Nursing Notes. I have reviewed the NPO Status.      Review of Systems  Anesthesia Hx:  No problems with previous Anesthesia    Social:  Non-Smoker, No Alcohol Use    Hematology/Oncology:  Hematology Normal   Oncology Normal     EENT/Dental:EENT/Dental Normal   Cardiovascular:   Hypertension, well controlled    Pulmonary:  Pulmonary Normal    Renal/:  Renal/ Normal     Hepatic/GI:  Hepatic/GI Normal    Musculoskeletal:  Musculoskeletal Normal    Neurological:  Neurology Normal    Endocrine:  Endocrine Normal    Dermatological:  Skin Normal    Psych:  Psychiatric Normal           Physical Exam  General: Well nourished and Alert    Airway:  Mallampati: III / III  Mouth Opening: Small, but > 3cm  TM Distance: Normal  Tongue: Normal  Neck ROM: Normal ROM    Dental:  Intact        Anesthesia Plan  Type of Anesthesia, risks & benefits discussed:    Anesthesia Type: Gen ETT  Intra-op Monitoring Plan: Standard ASA Monitors  Post Op Pain Control Plan: IV/PO Opioids PRN  Induction:  IV  Airway Plan: Direct  Informed Consent: Informed consent signed with the Patient and all parties understand the risks and agree with anesthesia plan.  All questions answered.   ASA Score: 3 Emergent    Ready For Surgery From Anesthesia Perspective.     .  Lab Results   Component Value Date    WBC 13.59 (H) 05/07/2023    HGB 8.3 (L) 05/07/2023    HCT 26.4 (L) 05/07/2023    MCV 68 (L) 05/07/2023     (H) 05/07/2023     Past Medical History:   Diagnosis Date    Diabetes mellitus     Ovarian cancer     Stroke        History reviewed. No pertinent surgical history.    History reviewed. No pertinent family history.    Social History     Socioeconomic History    Marital status: Unknown    Tobacco Use    Smoking status: Unknown       Current Facility-Administered Medications   Medication Dose Route Frequency Provider Last Rate Last Admin    acetaminophen tablet 650 mg  650 mg Oral Q6H PRN Luis Denis MD        amLODIPine tablet 5 mg  5 mg Oral Daily Heydi Taarea, DO   5 mg at 05/06/23 1701    atorvastatin tablet 80 mg  80 mg Oral QHS Heydi Taarea, DO   80 mg at 05/06/23 2033    clindamycin in D5W 600 mg/50 mL IVPB 600 mg  600 mg Intravenous Q8H Luis Denis MD   Stopped at 05/07/23 0046    dextrose 10% bolus 125 mL 125 mL  12.5 g Intravenous PRN Heydi Taarea, DO        dextrose 10% bolus 250 mL 250 mL  25 g Intravenous PRN Heydi Taarea, DO        dextrose 40 % gel 15,000 mg  15 g Oral PRN Heydi Taarea, DO        dextrose 40 % gel 30,000 mg  30 g Oral PRN Heydi Taarea, DO        glucagon (human recombinant) injection 1 mg  1 mg Intramuscular PRN Heydi Taarea, DO        heparin (porcine) injection 5,000 Units  5,000 Units Subcutaneous Q12H Luis Denis MD   5,000 Units at 05/06/23 2033    hydrALAZINE injection 10 mg  10 mg Intravenous Q8H PRN Heydi Taarea, DO   10 mg at 05/06/23 2041    insulin aspart U-100 pen 0-5 Units  0-5 Units Subcutaneous QID (AC + HS) PRN Heydi Taarea, DO        lactated ringers infusion   Intravenous Continuous Heydi Taarea, DO 75 mL/hr at 05/07/23 0013 New Bag at 05/07/23 0013    ondansetron injection 4 mg  4 mg Intravenous Q8H PRN Luis Denis MD        sodium chloride 0.9% flush 10 mL  10 mL Intravenous PRN Luis Denis MD           Review of patient's allergies indicates:  No Known Allergies

## 2023-05-08 LAB
ALBUMIN SERPL BCP-MCNC: 2.3 G/DL (ref 3.5–5.2)
ALP SERPL-CCNC: 91 U/L (ref 55–135)
ALT SERPL W/O P-5'-P-CCNC: 49 U/L (ref 10–44)
ANION GAP SERPL CALC-SCNC: 12 MMOL/L (ref 8–16)
ANISOCYTOSIS BLD QL SMEAR: SLIGHT
AST SERPL-CCNC: 66 U/L (ref 10–40)
BASOPHILS # BLD AUTO: ABNORMAL K/UL (ref 0–0.2)
BASOPHILS NFR BLD: 0 % (ref 0–1.9)
BILIRUB SERPL-MCNC: 0.6 MG/DL (ref 0.1–1)
BUN SERPL-MCNC: 33 MG/DL (ref 6–20)
CALCIUM SERPL-MCNC: 8.9 MG/DL (ref 8.7–10.5)
CHLORIDE SERPL-SCNC: 107 MMOL/L (ref 95–110)
CK SERPL-CCNC: 289 U/L (ref 20–180)
CO2 SERPL-SCNC: 22 MMOL/L (ref 23–29)
CREAT SERPL-MCNC: 1.3 MG/DL (ref 0.5–1.4)
DACRYOCYTES BLD QL SMEAR: ABNORMAL
DIFFERENTIAL METHOD: ABNORMAL
EOSINOPHIL # BLD AUTO: ABNORMAL K/UL (ref 0–0.5)
EOSINOPHIL NFR BLD: 0 % (ref 0–8)
ERYTHROCYTE [DISTWIDTH] IN BLOOD BY AUTOMATED COUNT: 14.7 % (ref 11.5–14.5)
EST. GFR  (NO RACE VARIABLE): 51 ML/MIN/1.73 M^2
GLUCOSE SERPL-MCNC: 125 MG/DL (ref 70–110)
HCT VFR BLD AUTO: 25.5 % (ref 37–48.5)
HGB BLD-MCNC: 8 G/DL (ref 12–16)
IMM GRANULOCYTES # BLD AUTO: ABNORMAL K/UL (ref 0–0.04)
IMM GRANULOCYTES NFR BLD AUTO: ABNORMAL % (ref 0–0.5)
LYMPHOCYTES # BLD AUTO: ABNORMAL K/UL (ref 1–4.8)
LYMPHOCYTES NFR BLD: 13 % (ref 18–48)
MCH RBC QN AUTO: 22.1 PG (ref 27–31)
MCHC RBC AUTO-ENTMCNC: 31.4 G/DL (ref 32–36)
MCV RBC AUTO: 70 FL (ref 82–98)
MONOCYTES # BLD AUTO: ABNORMAL K/UL (ref 0.3–1)
MONOCYTES NFR BLD: 9 % (ref 4–15)
NEUTROPHILS NFR BLD: 78 % (ref 38–73)
NRBC BLD-RTO: 1 /100 WBC
PLATELET # BLD AUTO: 452 K/UL (ref 150–450)
PLATELET BLD QL SMEAR: ABNORMAL
PMV BLD AUTO: 10.4 FL (ref 9.2–12.9)
POCT GLUCOSE: 123 MG/DL (ref 70–110)
POCT GLUCOSE: 131 MG/DL (ref 70–110)
POCT GLUCOSE: 157 MG/DL (ref 70–110)
POCT GLUCOSE: 165 MG/DL (ref 70–110)
POIKILOCYTOSIS BLD QL SMEAR: SLIGHT
POTASSIUM SERPL-SCNC: 4.2 MMOL/L (ref 3.5–5.1)
PROT SERPL-MCNC: 6.8 G/DL (ref 6–8.4)
RBC # BLD AUTO: 3.62 M/UL (ref 4–5.4)
SODIUM SERPL-SCNC: 141 MMOL/L (ref 136–145)
TARGETS BLD QL SMEAR: ABNORMAL
WBC # BLD AUTO: 13.13 K/UL (ref 3.9–12.7)

## 2023-05-08 PROCEDURE — 21400001 HC TELEMETRY ROOM

## 2023-05-08 PROCEDURE — 85007 BL SMEAR W/DIFF WBC COUNT: CPT | Performed by: INTERNAL MEDICINE

## 2023-05-08 PROCEDURE — 92507 TX SP LANG VOICE COMM INDIV: CPT

## 2023-05-08 PROCEDURE — 97535 SELF CARE MNGMENT TRAINING: CPT

## 2023-05-08 PROCEDURE — 25000003 PHARM REV CODE 250: Performed by: INTERNAL MEDICINE

## 2023-05-08 PROCEDURE — 80053 COMPREHEN METABOLIC PANEL: CPT | Performed by: INTERNAL MEDICINE

## 2023-05-08 PROCEDURE — 97530 THERAPEUTIC ACTIVITIES: CPT

## 2023-05-08 PROCEDURE — 97110 THERAPEUTIC EXERCISES: CPT | Mod: CQ

## 2023-05-08 PROCEDURE — 36415 COLL VENOUS BLD VENIPUNCTURE: CPT | Performed by: INTERNAL MEDICINE

## 2023-05-08 PROCEDURE — 85027 COMPLETE CBC AUTOMATED: CPT | Performed by: INTERNAL MEDICINE

## 2023-05-08 PROCEDURE — 25000003 PHARM REV CODE 250: Performed by: STUDENT IN AN ORGANIZED HEALTH CARE EDUCATION/TRAINING PROGRAM

## 2023-05-08 PROCEDURE — 97530 THERAPEUTIC ACTIVITIES: CPT | Mod: CQ

## 2023-05-08 PROCEDURE — 63600175 PHARM REV CODE 636 W HCPCS: Performed by: INTERNAL MEDICINE

## 2023-05-08 PROCEDURE — 94761 N-INVAS EAR/PLS OXIMETRY MLT: CPT

## 2023-05-08 PROCEDURE — 25000003 PHARM REV CODE 250: Performed by: ANESTHESIOLOGY

## 2023-05-08 PROCEDURE — 82550 ASSAY OF CK (CPK): CPT | Performed by: INTERNAL MEDICINE

## 2023-05-08 RX ORDER — SODIUM CHLORIDE 9 MG/ML
INJECTION, SOLUTION INTRAVENOUS
Status: DISCONTINUED | OUTPATIENT
Start: 2023-05-08 | End: 2023-05-16 | Stop reason: HOSPADM

## 2023-05-08 RX ADMIN — SODIUM CHLORIDE: 9 INJECTION, SOLUTION INTRAVENOUS at 10:05

## 2023-05-08 RX ADMIN — AMLODIPINE BESYLATE 5 MG: 5 TABLET ORAL at 10:05

## 2023-05-08 RX ADMIN — CLINDAMYCIN PHOSPHATE 600 MG: 600 INJECTION, SOLUTION INTRAVENOUS at 11:05

## 2023-05-08 RX ADMIN — OXYCODONE HYDROCHLORIDE AND ACETAMINOPHEN 1 TABLET: 5; 325 TABLET ORAL at 08:05

## 2023-05-08 RX ADMIN — ASPIRIN 81 MG CHEWABLE TABLET 81 MG: 81 TABLET CHEWABLE at 10:05

## 2023-05-08 RX ADMIN — ATORVASTATIN CALCIUM 80 MG: 40 TABLET, FILM COATED ORAL at 08:05

## 2023-05-08 RX ADMIN — CLINDAMYCIN PHOSPHATE 600 MG: 600 INJECTION, SOLUTION INTRAVENOUS at 10:05

## 2023-05-08 RX ADMIN — CLINDAMYCIN PHOSPHATE 600 MG: 600 INJECTION, SOLUTION INTRAVENOUS at 05:05

## 2023-05-08 RX ADMIN — HEPARIN SODIUM 5000 UNITS: 5000 INJECTION INTRAVENOUS; SUBCUTANEOUS at 08:05

## 2023-05-08 RX ADMIN — CLOPIDOGREL BISULFATE 75 MG: 75 TABLET ORAL at 10:05

## 2023-05-08 RX ADMIN — HEPARIN SODIUM 5000 UNITS: 5000 INJECTION INTRAVENOUS; SUBCUTANEOUS at 10:05

## 2023-05-08 NOTE — PT/OT/SLP PROGRESS
"Occupational Therapy   Treatment    Name: Kenia Roper  MRN: [id number]  Admitting Diagnosis:  Acute metabolic encephalopathy  1 Day Post-Op    Recommendations:     Discharge Recommendations: rehabilitation facility  Discharge Equipment Recommendations:  to be determined by next level of care  Barriers to discharge:  None    Assessment:     Kenia Roper is a 47 y.o. female with a medical diagnosis of Acute metabolic encephalopathy.  She presents with the following performance deficits affecting function are weakness, impaired endurance, impaired self care skills, impaired functional mobility, impaired balance, impaired cardiopulmonary response to activity, decreased safety awareness, edema, impaired skin, impaired fine motor.     Rehab Prognosis:  Good; patient would benefit from acute skilled OT services to address these deficits and reach maximum level of function.       Plan:     Patient to be seen 2 x/week to address the above listed problems via self-care/home management, therapeutic activities, therapeutic exercises  Plan of Care Expires: 05/18/23  Plan of Care Reviewed with: patient    Subjective     Chief Complaint: Reported "My back is getting better."  Patient/Family Comments/goals: get better  Pain/Comfort:  Pain Rating 1: 5/10  Location 1: back  Pain Addressed 1:  (activity pacing)  Pain Rating Post-Intervention 1: 5/10    Objective:     Communicated with: NurseMarkos, prior to session.  Patient found left sidelying with telemetry (midline) upon OT entry to room.    General Precautions: Standard, fall    Orthopedic Precautions:N/A  Braces: N/A  Respiratory Status: Room air     Occupational Performance:     Bed Mobility:    Patient completed Supine to Sit with contact guard assistance     Functional Mobility/Transfers:  Patient completed Sit <> Stand Transfer with minimum assistance and of 2 persons  with  rolling walker   Patient completed Bed <> Chair Transfer using Step Transfer technique with " minimum assistance and of 2 persons with rolling walker  Functional Mobility: initiated ~3ft of steps to bedside chair for transfer, with RW and min A of 2    Activities of Daily Living:  Grooming: setup completed simple grooming, with increased time and R hand, from bedside chair/tray. Cueing to improve quality.    [facility] 6 Click ADL: 15    Treatment & Education:  Patient tolerated intervention well overall. Requires increased time to initiate engagement in mobility and max v/c for technique. Provided with education re: safe use of RW and techniques for transfers. Encouraged completion of B UE AROM therex throughout the day to increase functional strength and activity tolerance needed for ADL completion. Educated on benefits of OOB activity, goal of x2 hours in chair, and importance of calling for A to transfer back to bed. Clock batteries updated to allow patient to have better time tracking for repositioning to decrease risk of skin breakdown. Patient stated understanding and in agreement with POC.    Patient left up in chair with all lines intact, call button in reach, and chair alarm on    GOALS:   Multidisciplinary Problems       Occupational Therapy Goals          Problem: Occupational Therapy    Goal Priority Disciplines Outcome Interventions   Occupational Therapy Goal     OT, PT/OT Ongoing, Progressing    Description: O.T. GOALS TO BE MET BY 5-18-23  PT WILL TOLERATE 1 SET X 10 REPS B UE AA/AROM EXERCISE  MOD A WITH UE DRESSING  MOD A WITH ROLLING L<>R   MOD A WITH SUPINE<>SIT TRANSFERS                               Time Tracking:     OT Date of Treatment: 05/08/23  OT Start Time: 0905  OT Stop Time: 0930  OT Total Time (min): 25 min    Billable Minutes:Self Care/Home Management 10  Therapeutic Activity 15    5/8/2023

## 2023-05-08 NOTE — ASSESSMENT & PLAN NOTE
46yo F status post incision drainage back abscess    - continue antibiotics   - daily wet-to-dry dressing changes with Kerlix gauze, keep Penrose in place will remove in clinic  - follow-up in clinic in 2 weeks  -will sign off at this time, please call with questions

## 2023-05-08 NOTE — SUBJECTIVE & OBJECTIVE
Interval History:  Status post incision drainage back abscess, pain improving, afebrile  Medications:  Continuous Infusions:    Scheduled Meds:   amLODIPine  5 mg Oral Daily    aspirin  81 mg Oral Daily    atorvastatin  80 mg Oral QHS    clindamycin (CLEOCIN) IVPB  600 mg Intravenous Q8H    clopidogreL  75 mg Oral Daily    heparin (porcine)  5,000 Units Subcutaneous Q12H     PRN Meds:sodium chloride 0.9%, acetaminophen, dextrose 10%, dextrose 10%, dextrose, dextrose, glucagon (human recombinant), hydrALAZINE, HYDROmorphone, insulin aspart U-100, ondansetron, oxyCODONE-acetaminophen, sodium chloride 0.9%, sodium chloride 0.9%     Review of patient's allergies indicates:  No Known Allergies  Objective:     Vital Signs (Most Recent):  Temp: 98.8 °F (37.1 °C) (05/08/23 1239)  Pulse: 74 (05/08/23 1239)  Resp: 18 (05/08/23 1239)  BP: (!) 148/67 (05/08/23 1239)  SpO2: 96 % (05/08/23 1239) Vital Signs (24h Range):  Temp:  [96.7 °F (35.9 °C)-98.8 °F (37.1 °C)] 98.8 °F (37.1 °C)  Pulse:  [65-75] 74  Resp:  [14-22] 18  SpO2:  [95 %-98 %] 96 %  BP: (121-165)/(53-76) 148/67     Weight: 127.3 kg (280 lb 10.3 oz)  Body mass index is 48.17 kg/m².    Intake/Output - Last 3 Shifts         05/06 0700  05/07 0659 05/07 0700  05/08 0659 05/08 0700  05/09 0659    P.O. 580  240    I.V. (mL/kg)  1531.6 (12)     IV Piggyback  352     Total Intake(mL/kg) 580 (4.6) 1883.6 (14.8) 240 (1.9)    Urine (mL/kg/hr)       Total Output       Net +580 +1883.6 +240           Urine Occurrence 2 x 2 x     Stool Occurrence 2 x 1 x 0 x             Physical Exam  Vitals reviewed.   Constitutional:       Appearance: She is well-developed. She is obese.   HENT:      Head: Normocephalic and atraumatic.   Eyes:      Conjunctiva/sclera: Conjunctivae normal.   Neck:      Thyroid: No thyromegaly.   Cardiovascular:      Rate and Rhythm: Normal rate.   Pulmonary:      Effort: Pulmonary effort is normal. No respiratory distress.   Abdominal:      General: There is no  distension.      Palpations: Abdomen is soft. There is no mass.      Tenderness: There is no abdominal tenderness.   Skin:     Comments: Wound cellulitis improving no purulent drainage   Neurological:      Mental Status: She is alert.        Significant Labs:  I have reviewed all pertinent lab results within the past 24 hours.  CBC:   Recent Labs   Lab 05/08/23  0419   WBC 13.13*   RBC 3.62*   HGB 8.0*   HCT 25.5*   *   MCV 70*   MCH 22.1*   MCHC 31.4*       BMP:   Recent Labs   Lab 05/06/23  0615 05/07/23  0555 05/08/23 0419   *   < > 125*      < > 141   K 3.3*   < > 4.2      < > 107   CO2 23   < > 22*   BUN 49*   < > 33*   CREATININE 1.7*   < > 1.3   CALCIUM 8.8   < > 8.9   MG 2.1  --   --     < > = values in this interval not displayed.         Significant Diagnostics:  I have reviewed all pertinent imaging results/findings within the past 24 hours.    Impression:     Imaging degraded related to large body habitus.  As visualized no overt bony marrow edema.     Discogenic spondylitic indentation upon the ventral thecal sac lower cervical spine incompletely evaluated.  No overt compromise of the thoracic canal.  Thoracic cord not well discriminated but without overt pathology.     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  No overt extension into the spinal canal.

## 2023-05-08 NOTE — PROGRESS NOTES
[facility] - Telemetry ([facility])  General Surgery  Progress Note    Subjective:     History of Present Illness:  58yo F who is admitted to the [facility] medicine service for evaluation of altered mental status, CVA and being found unresponsive at home.  Patient was reportedly found down at home covered in feces and urine with her last known well check around 2 days before this.  Has a known history of CVA and type 2 DM.  Was found to be septic upon presentation with a leukocytosis of 20k and elevated creatinine at 3.5.  She is admitted to the [facility] medical service for evaluation of altered mental status and neurologic changes along with workup of her sepsis.  She was found to have a large middle back abscess in her upper back which was actively draining purulence upon admission.  Wound care was consulted and thought she may need surgical debridement.  General surgery was consulted for evaluation.  She is a pending MRI T-spine.  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      Post-Op Info:  Procedure(s) (LRB):  INCISION AND DRAINAGE, ABSCESS (N/A)   1 Day Post-Op     Interval History:  Status post incision drainage back abscess, pain improving, afebrile  Medications:  Continuous Infusions:    Scheduled Meds:   amLODIPine  5 mg Oral Daily    aspirin  81 mg Oral Daily    atorvastatin  80 mg Oral QHS    clindamycin (CLEOCIN) IVPB  600 mg Intravenous Q8H    clopidogreL  75 mg Oral Daily    heparin (porcine)  5,000 Units Subcutaneous Q12H     PRN Meds:sodium chloride 0.9%, acetaminophen, dextrose 10%, dextrose 10%, dextrose, dextrose, glucagon (human recombinant), hydrALAZINE, HYDROmorphone, insulin aspart U-100, ondansetron, oxyCODONE-acetaminophen, sodium chloride 0.9%, sodium chloride 0.9%     Review of patient's allergies indicates:  No Known Allergies  Objective:     Vital Signs (Most Recent):  Temp: 98.8 °F (37.1 °C) (05/08/23 1239)  Pulse: 74 (05/08/23 1239)  Resp: 18 (05/08/23  1239)  BP: (!) 148/67 (05/08/23 1239)  SpO2: 96 % (05/08/23 1239) Vital Signs (24h Range):  Temp:  [96.7 °F (35.9 °C)-98.8 °F (37.1 °C)] 98.8 °F (37.1 °C)  Pulse:  [65-75] 74  Resp:  [14-22] 18  SpO2:  [95 %-98 %] 96 %  BP: (121-165)/(53-76) 148/67     Weight: 127.3 kg (280 lb 10.3 oz)  Body mass index is 48.17 kg/m².    Intake/Output - Last 3 Shifts         05/06 0700  05/07 0659 05/07 0700  05/08 0659 05/08 0700  05/09 0659    P.O. 580  240    I.V. (mL/kg)  1531.6 (12)     IV Piggyback  352     Total Intake(mL/kg) 580 (4.6) 1883.6 (14.8) 240 (1.9)    Urine (mL/kg/hr)       Total Output       Net +580 +1883.6 +240           Urine Occurrence 2 x 2 x     Stool Occurrence 2 x 1 x 0 x            Physical Exam  Vitals reviewed.   Constitutional:       Appearance: She is well-developed. She is obese.   HENT:      Head: Normocephalic and atraumatic.   Eyes:      Conjunctiva/sclera: Conjunctivae normal.   Neck:      Thyroid: No thyromegaly.   Cardiovascular:      Rate and Rhythm: Normal rate.   Pulmonary:      Effort: Pulmonary effort is normal. No respiratory distress.   Abdominal:      General: There is no distension.      Palpations: Abdomen is soft. There is no mass.      Tenderness: There is no abdominal tenderness.   Skin:     Comments: Wound cellulitis improving no purulent drainage   Neurological:      Mental Status: She is alert.        Significant Labs:  I have reviewed all pertinent lab results within the past 24 hours.  CBC:   Recent Labs   Lab 05/08/23 0419   WBC 13.13*   RBC 3.62*   HGB 8.0*   HCT 25.5*   *   MCV 70*   MCH 22.1*   MCHC 31.4*       BMP:   Recent Labs   Lab 05/06/23  0615 05/07/23  0555 05/08/23 0419   *   < > 125*      < > 141   K 3.3*   < > 4.2      < > 107   CO2 23   < > 22*   BUN 49*   < > 33*   CREATININE 1.7*   < > 1.3   CALCIUM 8.8   < > 8.9   MG 2.1  --   --     < > = values in this interval not displayed.         Significant Diagnostics:  I have reviewed all  pertinent imaging results/findings within the past 24 hours.    Impression:     Imaging degraded related to large body habitus.  As visualized no overt bony marrow edema.     Discogenic spondylitic indentation upon the ventral thecal sac lower cervical spine incompletely evaluated.  No overt compromise of the thoracic canal.  Thoracic cord not well discriminated but without overt pathology.     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  No overt extension into the spinal canal.    Assessment/Plan:     * Acute metabolic encephalopathy  Care per primary team    Back abscess  48yo F status post incision drainage back abscess    - continue antibiotics   - daily wet-to-dry dressing changes with Kerlix gauze, keep Penrose in place will remove in clinic  - follow-up in clinic in 2 weeks  -will sign off at this time, please call with questions    High anion gap metabolic acidosis  Care per primary team    Type 2 diabetes mellitus with hyperglycemia  Care per primary team    Elevated troponin  Care per primary team    LUISA (acute kidney injury)  Care per primary team/nephrology    Severe sepsis  Care per primary team    Morbid obesity with BMI of 45.0-49.9, adult  Care per primary team        Bertin Spivey MD  General Surgery  [facility] - Telemetry ([facility])

## 2023-05-08 NOTE — PLAN OF CARE
Pt AAOx4. NSR on the heart monitor. On room air; tolerating well. Pt incontinent; brief in place. Pt turned and repositioned with use of pillows and wedge. PIV CDI with no redness, swelling, warmth, or drainage. Blood sugar monitored. Bed low, wheels locked, alarms audible. Plan of care reviewed. Vital signs monitored. Fall precautions in place. Pain assessed. Safety promoted. Infection risks reduced.

## 2023-05-08 NOTE — PT/OT/SLP PROGRESS
Physical Therapy  Treatment    Kenia Roper   MRN: [id number]   Admitting Diagnosis: Acute metabolic encephalopathy    PT Received On: 05/08/23  PT Start Time: 0905     PT Stop Time: 0930    PT Total Time (min): 25 min       Billable Minutes:  Therapeutic Activity 15 and Therapeutic Exercise 10    Treatment Type: Treatment  PT/PTA: PTA     Number of PTA visits since last PT visit: 1       General Precautions: Standard, fall  Orthopedic Precautions: N/A  Braces: N/A  Respiratory Status: Room air    Spiritual, Cultural Beliefs, Buddhist Practices, Values that Affect Care: no    Subjective:  Communicated with patient's nurse, Markos, and completed Epic chart review prior to session.  Patient agreed to PT session.     Pain/Comfort  Pain Rating 1: 5/10  Location - Orientation 1: upper  Location 1: back  Pain Addressed 1: Other (see comments) (ACTIVITY PACING)  Pain Rating Post-Intervention 1: 5/10    Objective:   Patient found with: telemetry, Other (comments) (MIDLINE)    Supine > sit EOB: CGA (VC for sequencing of task)    Forward scoot towards EOB: SBA    STS from EOB > RW: Min A of 2  (VC for hand placement)    Stand pivot T/F to chair from EOB  w/ RW: Min A of 2 (VC for safety w/ RW mgmt and sequencing of task)    Completed x10 reps AROM TE to BLE: LAQ, Hip Flex, AP   Intermittent cues given as needed to maintain correct form during repetitions    Educated patient on importance of increased tolerance to upright position and direct impact on CV endurance and strength. Patient encouraged to sit up in chair/ EOB, for a minimum of 2 consecutive hours, 3x per day. Encouraged patient to perform AROM TE to BLE throughout the day within all available planes of motion. Re enforced importance of utilizing call light to meet needs in room and not attempt to get up without staff assistance. Patient verbalized understanding and agreed to comply.      AM-PAC 6 CLICK MOBILITY  How much help from another person does this patient  currently need?   1 = Unable, Total/Dependent Assistance  2 = A lot, Maximum/Moderate Assistance  3 = A little, Minimum/Contact Guard/Supervision  4 = None, Modified [place]/Independent    Turning over in bed (including adjusting bedclothes, sheets and blankets)?: 3  Sitting down on and standing up from a chair with arms (e.g., wheelchair, bedside commode, etc.): 2  Moving from lying on back to sitting on the side of the bed?: 3  Moving to and from a bed to a chair (including a wheelchair)?: 2  Need to walk in hospital room?: 1 (NT)  Climbing 3-5 steps with a railing?: 1 (NT)  Basic Mobility Total Score: 12    AM-PAC Raw Score CMS G-Code Modifier Level of Impairment Assistance   6 % Total / Unable   7 - 9 CM 80 - 100% Maximal Assist   10 - 14 CL 60 - 80% Moderate Assist   15 - 19 CK 40 - 60% Moderate Assist   20 - 22 CJ 20 - 40% Minimal Assist   23 CI 1-20% SBA / CGA   24 CH 0% Independent/ Mod I     Patient left up in chair with call button in reach and chair alarm on.    Assessment:  Kenia Roper is a 47 y.o. female with a medical diagnosis of Acute metabolic encephalopathy and presents with overall decline in functional mobility. Patient would continue to benefit from skilled PT to address functional limitations listed below in order to return to PLOF/decrease caregiver burden.      Rehab identified problem list/impairments: weakness, impaired endurance, impaired sensation, impaired self care skills, impaired functional mobility, gait instability, impaired balance, decreased coordination, decreased upper extremity function, decreased lower extremity function, decreased safety awareness, pain, decreased ROM, impaired cardiopulmonary response to activity    Rehab potential is fair.    Activity tolerance: Fair    Discharge recommendations: rehabilitation facility      Barriers to discharge:      Equipment recommendations: to be determined by next level of care     GOALS:   Multidisciplinary Problems        Physical Therapy Goals          Problem: Physical Therapy    Goal Priority Disciplines Outcome Goal Variances Interventions   Physical Therapy Goal     PT, PT/OT Ongoing, Progressing     Description: Pt will perform bed mobility SBA in order to participate in EOB activity.  Pt will perform transfers CGA in order to participate in OOB activity.   Pt will ambulate 50ft CGA with LRAD in order to participate in daily tasks.                         PLAN:    Patient to be seen 3 x/week to address the above listed problems via gait training, therapeutic activities, therapeutic exercises  Plan of Care expires: 05/18/23  Plan of Care reviewed with: patient         05/08/2023

## 2023-05-08 NOTE — PROGRESS NOTES
[facility] Medicine  Progress Note     Patient Name: Kenia Roper  MRN: [id number]  Patient Class: IP- Inpatient     Admission Date: 5/3/2023  Length of Stay: 5 days  Attending Physician: Vin Leonard MD  Primary Care Provider: Ross Perdomo MD           Subjective:      Principal Problem:Acute metabolic encephalopathy           HPI:  Ms. Roper is a 57-year-old morbidly obese  female, was brought in to the ED in an unresponsive state.  No family at the bedside, unable to obtain any information from patient.  No information per Care everywhere.  Most of the information obtained from ED staff.  Per report, patient was found down at home, covered in feces and urine.  Last known well two days ago.  No report of fever or chills.  Known history of CVA and type 2 diabetes mellitus.  In the ED she is afebrile, mildly tachycardic HR .  WBC elevated 20,000. BUN 60, creatinine 3.5 (unknown baseline).  Elevated LFTs in the 400s.  Troponin elevated 0.259, unknown if she has chest pain or SOB.  EKG NSR with no ST T wave changes.  Hemodynamically stable.  Presumed severe sepsis with LUISA/AMS, she received 30 mL/kilogram bolus in the ED along with IV Zosyn empirically.  Cultures obtained.  UDS positive for opiates.  CT head is negative for acute intracranial pathology.  CXR negative for infectious process.    Admitting diagnosis:  Severe sepsis.  Acute encephalopathy (TIA versus uremia).  LUISA.  Elevated troponin.  Elevated LFTs.        Overview/Hospital Course:  MRI brain revealed bilateral small scattered acute infarcts favoring embolic type infarcts.  Neurology consulted     Upon further examination, patient was found to have a large middle back abscess with purulent drainage.  CT of thoracic spine done without contrast showed subcutaneous on organized air-fluid collection dorsally consistent with clinical abscess or gas producing infection with no overt underlying bony  "change or involvement of the spinal canal.  Patient started on empiric antibiotics with Zosyn and clindamycin.  General surgery consulted and recommended MRI of thoracic spine and Neurosurgery consultation.  MRI of thoracic spine showed no overt bony marrow edema and no overt compromise of the thoracic canal, subcutaneous abscess or phlegmon with cutaneous fistula with no overt extension into the spinal canal.     Neurosurgery recommends no neurosurgical intervention as there is no evidence of cord compromise.  General surgery performed I&D of back abscess on 05/07/2023.  Blood cultures NGTD     Echocardiogram showed LVEF 55%, normal left ventricular diastolic function, no evidence of intracardiac shunting.  She will need cardiac monitoring to rule out AFib     Mentation improved, patient awake, alert, oriented although notable to have lower extremity weakness and difficulty with word finding.  Neurology consulted and recommended MRA brain which showed degraded image, scattered bilateral foci acute ischemia supratentorial similar to MRI brain, apparent relative diminished flow of left MCA which may be partially occlusive, otherwise no large vessel occlusion or critical stenosis noted.     PT/OT/SLP recommended rehab placement on discharge.  SLP also recommends patient be placed on soft and bite size diet (IDDSI level 6), with thin liquids     Case management working on placement for discharge        Interval History  No acute events overnight. Doing well this AM. Oriented x3 and has no complaints at our encounter. Denies CP, SOB, Abdominal pain, fevers/chills.    Objective  BP (!) 165/76 (BP Location: Right arm, Patient Position: Lying)   Pulse 65   Temp 97.9 °F (36.6 °C) (Oral)   Resp 17   Ht 5' 4" (1.626 m)   Wt 127.3 kg (280 lb 10.3 oz)   LMP  (LMP Unknown)   SpO2 96%   Breastfeeding No   BMI 48.17 kg/m²     Intake/Output Summary (Last 24 hours) at 5/8/2023 0708  Last data filed at 5/8/2023 0533  Gross " per 24 hour   Intake 1883.59 ml   Output --   Net 1883.59 ml       PHYSICAL EXAM  Constitutional:       General: She is not in acute distress.     Appearance: She is morbidly obese.   Cardiovascular:      Rate and Rhythm: Normal rate and regular rhythm.   Pulmonary:      Effort: Pulmonary effort is normal. No accessory muscle usage or respiratory distress.      Breath sounds: No wheezing.   Abdominal:      General: Bowel sounds are normal. There is no distension.      Palpations: Abdomen is soft.      Tenderness: There is no abdominal tenderness. There is no guarding or rebound.   Musculoskeletal:      Cervical back: Neck supple.      Comments: Moving both lower extremities in bed   Skin:     General: Skin is warm and dry.   Neurological:      Mental Status: She is alert and oriented to person, place, and time.   Psychiatric:         Mood and Affect: Affect is flat.       BMP:   Recent Labs   Lab 05/08/23  0419   *      K 4.2      CO2 22*   BUN 33*   CREATININE 1.3   CALCIUM 8.9     CBC:   Recent Labs   Lab 05/07/23  0556 05/08/23  0419   WBC 13.59* 13.13*   HGB 8.3* 8.0*   HCT 26.4* 25.5*   * 452*     CMP:   Recent Labs   Lab 05/07/23  0555 05/08/23  0419    141   K 3.8 4.2    107   CO2 22* 22*   * 125*   BUN 38* 33*   CREATININE 1.4 1.3   CALCIUM 8.8 8.9   PROT 7.0 6.8   ALBUMIN 2.2* 2.3*   BILITOT 0.6 0.6   ALKPHOS 99 91   AST 42* 66*   ALT 47* 49*   ANIONGAP 13 12     Cardiac Markers: No results for input(s): CKMB, MYOGLOBIN, BNP, TROPISTAT in the last 48 hours.  Coagulation: No results for input(s): PT, INR, APTT in the last 48 hours.  Lactic Acid: No results for input(s): LACTATE in the last 48 hours.  Magnesium: No results for input(s): MG in the last 48 hours.  Troponin: No results for input(s): TROPONINI, TROPONINIHS in the last 48 hours.  TSH:   Recent Labs   Lab 05/04/23  0639   TSH 2.690     Urine Studies:   No results for input(s): COLORU, APPEARANCEUA, PHUR,  SPECGRAV, PROTEINUA, GLUCUA, KETONESU, BILIRUBINUA, OCCULTUA, NITRITE, UROBILINOGEN, LEUKOCYTESUR, RBCUA, WBCUA, BACTERIA, SQUAMEPITHEL, HYALINECASTS in the last 48 hours.    Invalid input(s): WRIGHTSUR    Imaging Results              MRI Thoracic Spine Without Contrast (Final result)  Result time 05/05/23 19:20:43   Procedure changed from MRI Thoracic Spine W WO Cont     Final result by Tammie Peña MD (05/05/23 19:20:43)                   Impression:      Imaging degraded related to large body habitus.  As visualized no overt bony marrow edema.    Discogenic spondylitic indentation upon the ventral thecal sac lower cervical spine incompletely evaluated.  No overt compromise of the thoracic canal.  Thoracic cord not well discriminated but without overt pathology.    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  No overt extension into the spinal canal.      Electronically signed by: Tammie Peña  Date:    05/05/2023  Time:    19:20               Narrative:    EXAMINATION:  MRI THORACIC SPINE WITHOUT CONTRAST    CLINICAL HISTORY:  back wound concern for osteo;    TECHNIQUE:  Multiplanar, multisequence images were performed through the thoracic spine.  Contrast was not administered.    COMPARISON:  CT correlation                                       US Retroperitoneal Complete (Final result)  Result time 05/04/23 15:45:42      Final result by Chente Guzman MD (05/04/23 15:45:42)                   Impression:      No significant abnormality.      Electronically signed by: Chente Guzman  Date:    05/04/2023  Time:    15:45               Narrative:    EXAMINATION:  US RETROPERITONEAL COMPLETE    CLINICAL HISTORY:  LUISA;    TECHNIQUE:  Ultrasound of the kidneys and urinary bladder was performed including color flow and Doppler evaluation of the kidneys.    COMPARISON:  Abdominal ultrasound May 4, 2023.    FINDINGS:  Right kidney: The right kidney measures 13.7 cm. No cortical  thinning.  Resistive index measures 0.69.  No mass. No renal stone. No hydronephrosis.    Left kidney: The left kidney measures 11.2 cm. No cortical thinning. Resistive index measures 0.7.  No mass. No renal stone. No hydronephrosis.    The bladder is partially distended at the time of scanning and has an unremarkable appearance.                                       MRI Brain Without Contrast (Final result)  Result time 05/04/23 14:18:14      Final result by Delbert Cai MD (05/04/23 14:18:14)                   Impression:      Bilateral small scattered acute infarcts favoring embolic type infarcts.    COMMUNICATION  This critical result was discovered/received at 210 p.m..  The critical information above was relayed directly by me by telephone to Shabana cash LPN on 05/04/2023 at 14:17.      Electronically signed by: Delbert Cai  Date:    05/04/2023  Time:    14:18               Narrative:    EXAMINATION:  MRI BRAIN WITHOUT CONTRAST    CLINICAL HISTORY:  Mental status change, unknown cause;.    TECHNIQUE:  Multiplanar multisequence MR imaging of the brain was performed without contrast.    COMPARISON:  Multiple priors.    FINDINGS:  Intracranial compartment:    Motion severely degrades the exam.    Ventricles and sulci are normal in size for age without evidence of hydrocephalus. No extra-axial blood or fluid collections.    Multiple small scattered acute infarcts some cortical and others within the white matter.  These favor embolic type infarcts.  Infarcts are noted in the bilateral frontal, and parietal lobes and basal ganglia.  Suspected bilateral occipital infarcts.  No mass lesion, acute hemorrhage, edema or acute infarct.    Normal vascular flow voids are preserved.    Skull/extracranial contents (limited evaluation): Bone marrow signal intensity is normal.                                       US Abdomen Limited (Final result)  Result time 05/04/23 09:17:50      Final result by Luis Locke MD  (05/04/23 09:17:50)                   Impression:      Technically difficult examination.    Enlarged liver with probable fatty infiltration.    Cholelithiasis without evidence of acute cholecystitis.    Additional findings as above.      Electronically signed by: Luis Locke  Date:    05/04/2023  Time:    09:17               Narrative:    EXAMINATION:  US ABDOMEN LIMITED    CLINICAL HISTORY:  elevated LFTs;.    TECHNIQUE:  Limited ultrasound of the right upper quadrant of the abdomen including pancreas, liver, gallbladder, common bile duct was performed.  Technically difficult examination secondary to patient body positioning and habitus.  Prominent overlying bowel gas.    COMPARISON:  None.    FINDINGS:  Liver: Normal in size, measuring 21.1 cm. Probable fatty liver infiltration. No focal hepatic lesions.  Portal vein patent and demonstrates proper directional flow.    Gallbladder: Gallbladder filled with gallstones creating wall echo shadow sign.  There is no gallbladder wall thickening or pericholecystic fluid.  No reported positive sonographic Collins's sign.    Biliary system: The common duct is not dilated, measuring 4 mm.  No intrahepatic ductal dilatation.    Spleen: Upper limits normal in size with a homogeneous echotexture, measuring 11.1 cm.    Pancreas: The visualized portions of pancreas appear normal.    Miscellaneous: No ascites.                                       US Carotid Bilateral (Final result)  Result time 05/04/23 08:51:37      Final result by Tonya No MD (05/04/23 08:51:37)                   Impression:      No evidence of a hemodynamically significant carotid bifurcation stenosis.      Electronically signed by: Tonya No  Date:    05/04/2023  Time:    08:51               Narrative:    EXAMINATION:  US CAROTID BILATERAL    CLINICAL HISTORY:  AMS;    TECHNIQUE:  Grayscale and color Doppler ultrasound examination of the carotid and vertebral artery systems bilaterally.   Stenosis estimates are per the NASCET measurement criteria.    COMPARISON:  None.    FINDINGS:  Right:    Internal Carotid Artery (ICA) peak systolic velocity 106 cm/sec    ICA/CCA peak systolic velocity ratio: 1.3    Plaque formation: No significant    Vertebral artery: Antegrade flow and normal waveform.    Left:    Internal Carotid Artery (ICA)  peak systolic velocity 94 cm/sec    ICA/CCA peak systolic velocity ratio: 1.1    Plaque formation: Mild homogeneous    Vertebral artery: Antegrade flow and normal waveform.                                       CT Head Without Contrast (Final result)  Result time 05/03/23 22:50:33      Final result by Tammie Peña MD (05/03/23 22:50:33)                   Impression:      No acute finding as visualized with image degradation      Electronically signed by: Tammie Peña  Date:    05/03/2023  Time:    22:50               Narrative:    EXAMINATION:  CT HEAD WITHOUT CONTRAST    CLINICAL HISTORY:  Mental status change, unknown cause;    TECHNIQUE:  Low dose axial images were obtained through the head.  Coronal and sagittal reformations were also performed. Contrast was not administered.    COMPARISON:  None.    FINDINGS:  Iterative technique for diminishing radiation exposure automated dose    No acute intracranial hemorrhage or mass effect as visualized motion degradation.  No hydrocephalus.  Mild chronic changes.  Right scalp swelling or contusion.  No displaced skull fracture                                       X-Ray Chest AP Portable (Final result)  Result time 05/03/23 22:41:52      Final result by Tammie Peña MD (05/03/23 22:41:52)                   Impression:      No active finding      Electronically signed by: Tammie Peña  Date:    05/03/2023  Time:    22:41               Narrative:    EXAMINATION:  XR CHEST AP PORTABLE    CLINICAL HISTORY:  Sepsis;    TECHNIQUE:  Single frontal portable view of the chest was  performed.    COMPARISON:  None    FINDINGS:  No pulmonary consolidation or sizable pleural effusion                                      Assessment/Plan:      * Acute metabolic encephalopathy  Patient found down at home, last well-known two days prior to ED presentation  History of CVA, lives at home with family and apparently able to participate in ADLs including babysitting her grandchild  CT head negative for acute intracranial pathology, however MRI brain revealed bilateral small acute infarcts consistent with embolic stroke  Also found to have back abscess on spine imaging  Metabolic encephalopathy noted on admission apparently multifactorial in etiology secondary to stroke and infectious process   Mentation improved    05/08/2023  Seemingly resolved at today's encounter     Elevated CPK  Elevation likely secondary to muscle breakdown as patient was found down at home for unknown period of time  Trended down with IV fluid     Cerebrovascular accident (CVA) due to embolism  Patient noted to have multiple areas of infarction on both hemispheres concerning for embolic phenomena  Carotid ultrasound showed no significant stenosis      Antithrombotics for secondary stroke prevention: Antiplatelets: Aspirin: 81 mg daily  Clopidogrel: 75 mg daily     Statins for secondary stroke prevention and hyperlipidemia, if present:   Statins: Atorvastatin- 80 mg daily     Aggressive risk factor modification: HTN, DM, Obesity     Rehab efforts: The patient has been evaluated by a stroke team provider and the therapy needs have been fully considered based off the presenting complaints and exam findings. The following therapy evaluations are needed: PT evaluate and treat, OT evaluate and treat, SLP evaluate and treat     Diagnostics ordered/pending: MRA head to assess vasculature, TTE to assess cardiac function/status , TSH to assess thyroid function     VTE prophylaxis: Enoxaparin 40 mg SQ every 24 hours     BP parameters:  Infarct: No intervention, SBP <220      Patient is past permissive hypertension period.  Optimize BP  Patient will need cardiac monitoring given embolic nature of stroke with no evidence of intracardiac shunting     05/08/2023  -ambulated 3 ft with max A and RW  -PT/OT recommended rehab placement on discharge   -SLP recommends dysphagia soft diet with thin liquid     Back abscess  Patient found to have drainage of purulence material from her back  CT of thoracic spine confirms abscess, MRI shows no spine involvement  General surgery consulted, recommended neurosurgery evaluation  No surgical intervention recommended  S/p I and D of 10 x 8 cm back abscess by General surgery on 05/07/2023  Wound care  Continue clindamycin    05/08/2023  --echo negative for vegetation, shunting  --Blood cultures NGTD        High anion gap metabolic acidosis  Resolved with IV fluids in setting of LUISA versus LUISA superimposed on CKD  Monitor        Elevated LFTs  , , .  Total bilirubin 1.3 on arrival  Acute hepatitis panel negative  Abdominal ultrasound shows no acute abnormality  LFTs downtrending   Continue to monitor    05/08/2023  --bumped on this AM labs, but likely d/t serum concentration following surgery  --continue to trend daily        Type 2 diabetes mellitus with hyperglycemia  Blood sugar mildly elevated 166 on admission  Hemoglobin A1c 7.1    Accu-Cheks with SSI  Diabetic diet  Optimize diabetic regimen in setting of acute embolic stroke  Unclear if patient is on any meds for diabetes outpatient     Elevated troponin  Troponin elevated 0.25 on admission, in the setting of elevated creatinine 3.5.  Repeat troponin remained flat  EKG NSR with no ST or T-wave changes.    Likely demand ischemia in setting of infectious process and acute embolic stroke  Although patient initially encephalopathic, now that mentation has improved denies any chest pain     LUISA (acute kidney injury)  05/08/2023  resolved      Severe sepsis  05/08/2023  --leukocytosis down-trending, afebrile  --evidence of infective focus: back abscess  --Blood Cultures (5/3/23)- NGTD  --continue IV clindamycin  --Organ dysfunction indicated by Acute kidney injury, Encephalopathy , and Acute liver injury- all of which has been improving     Morbid obesity with BMI of 45.0-49.9, adult  Body mass index is 48.17 kg/m². Morbid obesity complicates all aspects of disease management from diagnostic modalities to treatment. Weight loss encouraged and health benefits explained to patient.                       VTE Risk Mitigation (From admission, onward)           Ordered       heparin (porcine) injection 5,000 Units  Every 12 hours         05/04/23 0456       IP VTE HIGH RISK PATIENT  Once         05/04/23 0456       Place sequential compression device  Until discontinued         05/04/23 0456       Place sequential compression device  Until discontinued         05/04/23 0043                    Discharge Planning   CRISPIN:      Code Status: Full Code   Is the patient medically ready for discharge?:     Reason for patient still in hospital (select all that apply): Patient trending condition, Treatment and Pending disposition  Discharge Plan A: Rehab          Vin Leonard MD  Department of Hospital Medicine   [facility] - Telemetry ([facility])

## 2023-05-09 LAB
ALBUMIN SERPL BCP-MCNC: 2.3 G/DL (ref 3.5–5.2)
ALP SERPL-CCNC: 92 U/L (ref 55–135)
ALT SERPL W/O P-5'-P-CCNC: 58 U/L (ref 10–44)
ANION GAP SERPL CALC-SCNC: 8 MMOL/L (ref 8–16)
ANISOCYTOSIS BLD QL SMEAR: SLIGHT
AST SERPL-CCNC: 85 U/L (ref 10–40)
BACTERIA BLD CULT: NORMAL
BACTERIA BLD CULT: NORMAL
BASOPHILS NFR BLD: 0 % (ref 0–1.9)
BILIRUB SERPL-MCNC: 0.6 MG/DL (ref 0.1–1)
BUN SERPL-MCNC: 24 MG/DL (ref 6–20)
CALCIUM SERPL-MCNC: 8.8 MG/DL (ref 8.7–10.5)
CHLORIDE SERPL-SCNC: 108 MMOL/L (ref 95–110)
CO2 SERPL-SCNC: 25 MMOL/L (ref 23–29)
CREAT SERPL-MCNC: 1 MG/DL (ref 0.5–1.4)
DIFFERENTIAL METHOD: ABNORMAL
EOSINOPHIL NFR BLD: 0 % (ref 0–8)
ERYTHROCYTE [DISTWIDTH] IN BLOOD BY AUTOMATED COUNT: 15.1 % (ref 11.5–14.5)
EST. GFR  (NO RACE VARIABLE): >60 ML/MIN/1.73 M^2
GLUCOSE SERPL-MCNC: 112 MG/DL (ref 70–110)
HCT VFR BLD AUTO: 26 % (ref 37–48.5)
HGB BLD-MCNC: 7.8 G/DL (ref 12–16)
HYPOCHROMIA BLD QL SMEAR: ABNORMAL
IMM GRANULOCYTES # BLD AUTO: ABNORMAL K/UL (ref 0–0.04)
IMM GRANULOCYTES NFR BLD AUTO: ABNORMAL % (ref 0–0.5)
LYMPHOCYTES NFR BLD: 26 % (ref 18–48)
MCH RBC QN AUTO: 21.2 PG (ref 27–31)
MCHC RBC AUTO-ENTMCNC: 30 G/DL (ref 32–36)
MCV RBC AUTO: 71 FL (ref 82–98)
MONOCYTES NFR BLD: 4 % (ref 4–15)
NEUTROPHILS NFR BLD: 68 % (ref 38–73)
NEUTS BAND NFR BLD MANUAL: 2 %
NRBC BLD-RTO: 1 /100 WBC
PLATELET # BLD AUTO: 400 K/UL (ref 150–450)
PLATELET BLD QL SMEAR: ABNORMAL
PMV BLD AUTO: 10.4 FL (ref 9.2–12.9)
POCT GLUCOSE: 108 MG/DL (ref 70–110)
POCT GLUCOSE: 114 MG/DL (ref 70–110)
POCT GLUCOSE: 126 MG/DL (ref 70–110)
POCT GLUCOSE: 141 MG/DL (ref 70–110)
POIKILOCYTOSIS BLD QL SMEAR: SLIGHT
POLYCHROMASIA BLD QL SMEAR: ABNORMAL
POTASSIUM SERPL-SCNC: 4.1 MMOL/L (ref 3.5–5.1)
PROT SERPL-MCNC: 6.6 G/DL (ref 6–8.4)
RBC # BLD AUTO: 3.68 M/UL (ref 4–5.4)
SODIUM SERPL-SCNC: 141 MMOL/L (ref 136–145)
WBC # BLD AUTO: 11.07 K/UL (ref 3.9–12.7)

## 2023-05-09 PROCEDURE — 80053 COMPREHEN METABOLIC PANEL: CPT | Performed by: INTERNAL MEDICINE

## 2023-05-09 PROCEDURE — 97116 GAIT TRAINING THERAPY: CPT

## 2023-05-09 PROCEDURE — 25000003 PHARM REV CODE 250: Performed by: INTERNAL MEDICINE

## 2023-05-09 PROCEDURE — 25000003 PHARM REV CODE 250: Performed by: STUDENT IN AN ORGANIZED HEALTH CARE EDUCATION/TRAINING PROGRAM

## 2023-05-09 PROCEDURE — 63600175 PHARM REV CODE 636 W HCPCS: Performed by: INTERNAL MEDICINE

## 2023-05-09 PROCEDURE — 97530 THERAPEUTIC ACTIVITIES: CPT

## 2023-05-09 PROCEDURE — 97110 THERAPEUTIC EXERCISES: CPT

## 2023-05-09 PROCEDURE — 36415 COLL VENOUS BLD VENIPUNCTURE: CPT | Performed by: INTERNAL MEDICINE

## 2023-05-09 PROCEDURE — 85007 BL SMEAR W/DIFF WBC COUNT: CPT | Performed by: INTERNAL MEDICINE

## 2023-05-09 PROCEDURE — 92526 ORAL FUNCTION THERAPY: CPT

## 2023-05-09 PROCEDURE — 21400001 HC TELEMETRY ROOM

## 2023-05-09 PROCEDURE — 85027 COMPLETE CBC AUTOMATED: CPT | Performed by: INTERNAL MEDICINE

## 2023-05-09 PROCEDURE — 92507 TX SP LANG VOICE COMM INDIV: CPT

## 2023-05-09 RX ORDER — AMLODIPINE BESYLATE 10 MG/1
10 TABLET ORAL DAILY
Status: DISCONTINUED | OUTPATIENT
Start: 2023-05-10 | End: 2023-05-16 | Stop reason: HOSPADM

## 2023-05-09 RX ORDER — LABETALOL HYDROCHLORIDE 5 MG/ML
10 INJECTION, SOLUTION INTRAVENOUS EVERY 6 HOURS PRN
Status: DISCONTINUED | OUTPATIENT
Start: 2023-05-09 | End: 2023-05-16 | Stop reason: HOSPADM

## 2023-05-09 RX ORDER — HYDROMORPHONE HYDROCHLORIDE 2 MG/ML
1 INJECTION, SOLUTION INTRAMUSCULAR; INTRAVENOUS; SUBCUTANEOUS EVERY 5 MIN PRN
Status: DISCONTINUED | OUTPATIENT
Start: 2023-05-09 | End: 2023-05-13

## 2023-05-09 RX ADMIN — HEPARIN SODIUM 5000 UNITS: 5000 INJECTION INTRAVENOUS; SUBCUTANEOUS at 09:05

## 2023-05-09 RX ADMIN — AMLODIPINE BESYLATE 5 MG: 5 TABLET ORAL at 09:05

## 2023-05-09 RX ADMIN — HEPARIN SODIUM 5000 UNITS: 5000 INJECTION INTRAVENOUS; SUBCUTANEOUS at 08:05

## 2023-05-09 RX ADMIN — HYDRALAZINE HYDROCHLORIDE 10 MG: 20 INJECTION, SOLUTION INTRAMUSCULAR; INTRAVENOUS at 08:05

## 2023-05-09 RX ADMIN — ATORVASTATIN CALCIUM 80 MG: 40 TABLET, FILM COATED ORAL at 08:05

## 2023-05-09 RX ADMIN — CLINDAMYCIN PHOSPHATE 600 MG: 600 INJECTION, SOLUTION INTRAVENOUS at 04:05

## 2023-05-09 RX ADMIN — CLINDAMYCIN PHOSPHATE 600 MG: 600 INJECTION, SOLUTION INTRAVENOUS at 09:05

## 2023-05-09 RX ADMIN — CLINDAMYCIN PHOSPHATE 600 MG: 600 INJECTION, SOLUTION INTRAVENOUS at 11:05

## 2023-05-09 RX ADMIN — CLOPIDOGREL BISULFATE 75 MG: 75 TABLET ORAL at 09:05

## 2023-05-09 RX ADMIN — ASPIRIN 81 MG CHEWABLE TABLET 81 MG: 81 TABLET CHEWABLE at 09:05

## 2023-05-09 RX ADMIN — HYDRALAZINE HYDROCHLORIDE 10 MG: 20 INJECTION, SOLUTION INTRAMUSCULAR; INTRAVENOUS at 01:05

## 2023-05-09 RX ADMIN — SODIUM CHLORIDE: 9 INJECTION, SOLUTION INTRAVENOUS at 04:05

## 2023-05-09 NOTE — PT/OT/SLP PROGRESS
"Physical Therapy  Treatment    Kenia Roper   MRN: [id number]   Admitting Diagnosis: Acute metabolic encephalopathy    PT Received On: 05/04/23  PT Start Time: 1123     PT Stop Time: 1205    PT Total Time (min): 42 min       Billable Minutes:  Gait Training 15 and Therapeutic Activity 27    Treatment Type: Treatment  PT/PTA: PT     Number of PTA visits since last PT visit: 0       General Precautions: Standard, fall  Orthopedic Precautions: N/A  Braces: N/A  Respiratory Status: Room air    Spiritual, Cultural Beliefs, Latter day Practices, Values that Affect Care: no    Subjective:  Communicated with nursing (Markos) and performed chart review via epic prior to session.  Pt agreeable to PT services "My back is hurting"    Pain/Comfort  Pain Rating 1: 5/10 (pain to back and I&D location)  Pain Addressed 1: Reposition, Distraction  Pain Rating Post-Intervention 1: 5/10 (pain to back and I&D location)  Pain Addressed 2: Reposition, Distraction    Objective:   Patient found with: telemetry, peripheral IV, PICC line (PICC line dressing found slightly uncovered, nsg notified and able to address issue quickly)    Functional Mobility:  Bed Mobility:    Sidelying to sit with mod A and cues for hand placement   Pt tolerated sitting EOB x 15-20 mins with cues for upright posture while getting PICC line redressed    Transfers:   Sit <> stand transfers with mod A and RW, cues for sequencing and hand placement.    Gait:    3ft mod/min A with RW, demonstrates flexed posture, wide DAMARI, shuffled steps    Balance:   Static Sit: FAIR+: Able to take MINIMAL challenges from all directions  Dynamic Sit: FAIR+: Maintains balance through MINIMAL excursions of active trunk motion  Static Stand: POOR: Needs MODERATE assist to maintain  Dynamic stand: POOR: Needs MOD (moderate) assist during gait       Treatment and Education:  Educated pt on benefits of consistent participation in PT services to meet functional goals. Educated to perform " exercises intermittently throughout day to tolerance. Educated pt on importance of sitting OOB to promote endurance and overall activity tolerance. Educated pt on call don't fall policy and use of call button to alert nursing staff of needs (including to assist with returning back to bed). Pt expressed understanding.       AM-PAC 6 CLICK MOBILITY  How much help from another person does this patient currently need?   1 = Unable, Total/Dependent Assistance  2 = A lot, Maximum/Moderate Assistance  3 = A little, Minimum/Contact Guard/Supervision  4 = None, Modified [place]/Independent    Turning over in bed (including adjusting bedclothes, sheets and blankets)?: 2  Sitting down on and standing up from a chair with arms (e.g., wheelchair, bedside commode, etc.): 2  Moving from lying on back to sitting on the side of the bed?: 2  Moving to and from a bed to a chair (including a wheelchair)?: 2  Need to walk in hospital room?: 2  Climbing 3-5 steps with a railing?: 1  Basic Mobility Total Score: 11    AM-PAC Raw Score CMS G-Code Modifier Level of Impairment Assistance   6 % Total / Unable   7 - 9 CM 80 - 100% Maximal Assist   10 - 14 CL 60 - 80% Moderate Assist   15 - 19 CK 40 - 60% Moderate Assist   20 - 22 CJ 20 - 40% Minimal Assist   23 CI 1-20% SBA / CGA   24 CH 0% Independent/ Mod I     Patient left up in chair with all lines intact, call button in reach, bed alarm on, and nursing notified.    Assessment:  Kenia Roper is a 47 y.o. female with a medical diagnosis of Acute metabolic encephalopathy and presents with the impairments listed below. Pt with lingering confusion but better able to participate in EOB and OOB activity. Pt will benefit from continued PT services in order to progress toward baseline.    Rehab identified problem list/impairments: weakness, impaired endurance, gait instability, impaired functional mobility, impaired balance, decreased coordination, impaired cognition, decreased  safety awareness, pain    Rehab potential is good.    Activity tolerance: Good    Discharge recommendations: rehabilitation facility      Barriers to discharge:      Equipment recommendations: to be determined by next level of care     GOALS:   Multidisciplinary Problems       Physical Therapy Goals          Problem: Physical Therapy    Goal Priority Disciplines Outcome Goal Variances Interventions   Physical Therapy Goal     PT, PT/OT Ongoing, Progressing     Description: Pt will perform bed mobility SBA in order to participate in EOB activity.  Pt will perform transfers CGA in order to participate in OOB activity.   Pt will ambulate 50ft CGA with LRAD in order to participate in daily tasks.                         PLAN:    Patient to be seen 3 x/week to address the above listed problems via gait training, therapeutic activities, therapeutic exercises, neuromuscular re-education  Plan of Care expires: 05/18/23  Plan of Care reviewed with: patient         05/09/2023

## 2023-05-09 NOTE — PLAN OF CARE
TX COMPLETED: facilitated bed mobility with mod A, transfers with mod A, ambulated 3 ft min A with RW. Recommend Rehab

## 2023-05-09 NOTE — PROGRESS NOTES
[facility] Medicine  Progress Note     Patient Name: Kenia Roper  MRN: [id number]  Patient Class: IP- Inpatient     Admission Date: 5/3/2023  Length of Stay: 6 days  Attending Physician: Vin Leonard MD  Primary Care Provider: Ross Perdomo MD           Subjective:      Principal Problem:Acute metabolic encephalopathy           HPI:  Ms. Roper is a 57-year-old morbidly obese  female, was brought in to the ED in an unresponsive state.  No family at the bedside, unable to obtain any information from patient.  No information per Care everywhere.  Most of the information obtained from ED staff.  Per report, patient was found down at home, covered in feces and urine.  Last known well two days ago.  No report of fever or chills.  Known history of CVA and type 2 diabetes mellitus.  In the ED she is afebrile, mildly tachycardic HR .  WBC elevated 20,000. BUN 60, creatinine 3.5 (unknown baseline).  Elevated LFTs in the 400s.  Troponin elevated 0.259, unknown if she has chest pain or SOB.  EKG NSR with no ST T wave changes.  Hemodynamically stable.  Presumed severe sepsis with LUISA/AMS, she received 30 mL/kilogram bolus in the ED along with IV Zosyn empirically.  Cultures obtained.  UDS positive for opiates.  CT head is negative for acute intracranial pathology.  CXR negative for infectious process.    Admitting diagnosis:  Severe sepsis.  Acute encephalopathy (TIA versus uremia).  LUISA.  Elevated troponin.  Elevated LFTs.        Overview/Hospital Course:  MRI brain revealed bilateral small scattered acute infarcts favoring embolic type infarcts.  Neurology consulted     Upon further examination, patient was found to have a large middle back abscess with purulent drainage.  CT of thoracic spine done without contrast showed subcutaneous on organized air-fluid collection dorsally consistent with clinical abscess or gas producing infection with no overt underlying bony  "change or involvement of the spinal canal.  Patient started on empiric antibiotics with Zosyn and clindamycin.  General surgery consulted and recommended MRI of thoracic spine and Neurosurgery consultation.  MRI of thoracic spine showed no overt bony marrow edema and no overt compromise of the thoracic canal, subcutaneous abscess or phlegmon with cutaneous fistula with no overt extension into the spinal canal.     Neurosurgery recommends no neurosurgical intervention as there is no evidence of cord compromise.  General surgery performed I&D of back abscess on 05/07/2023.  Blood cultures NGTD     Echocardiogram showed LVEF 55%, normal left ventricular diastolic function, no evidence of intracardiac shunting.  She will need cardiac monitoring to rule out AFib     Mentation improved, patient awake, alert, oriented although notable to have lower extremity weakness and difficulty with word finding.  Neurology consulted and recommended MRA brain which showed degraded image, scattered bilateral foci acute ischemia supratentorial similar to MRI brain, apparent relative diminished flow of left MCA which may be partially occlusive, otherwise no large vessel occlusion or critical stenosis noted.     PT/OT/SLP recommended rehab placement on discharge.  SLP also recommends patient be placed on soft and bite size diet (IDDSI level 6), with thin liquids     Case management working on placement for discharge        Interval History  No acute events overnight. Doing well this AM with no complaints at our encounter. Denies CP, SOB, Abdominal pain, fevers/chills.      Objective  BP (!) 187/90 (Patient Position: Sitting) Comment: MD aware  Pulse 78   Temp 98.9 °F (37.2 °C) (Oral)   Resp 18   Ht 5' 4" (1.626 m)   Wt 124.2 kg (273 lb 13 oz)   LMP  (LMP Unknown)   SpO2 100%   Breastfeeding No   BMI 47.00 kg/m²     Intake/Output Summary (Last 24 hours) at 5/9/2023 1401  Last data filed at 5/9/2023 0855  Gross per 24 hour   Intake " 418.47 ml   Output --   Net 418.47 ml       PHYSICAL EXAM  Constitutional:       General: She is not in acute distress.     Appearance: She is morbidly obese.   Cardiovascular:      Rate and Rhythm: Normal rate and regular rhythm.   Pulmonary:      Effort: Pulmonary effort is normal. No accessory muscle usage or respiratory distress.      Breath sounds: No wheezing.   Abdominal:      General: Bowel sounds are normal. There is no distension.      Palpations: Abdomen is soft.      Tenderness: There is no abdominal tenderness. There is no guarding or rebound.   Musculoskeletal:      Cervical back: Neck supple.      Comments: Moving both lower extremities in bed   Skin:     General: Skin is warm and dry.   Neurological:      Mental Status: She is alert and oriented to person, place, and time.   Psychiatric:         Mood and Affect: Affect is flat.          BMP:   Recent Labs   Lab 05/09/23  0450   *      K 4.1      CO2 25   BUN 24*   CREATININE 1.0   CALCIUM 8.8     CBC:   Recent Labs   Lab 05/08/23  0419 05/09/23  0450   WBC 13.13* 11.07   HGB 8.0* 7.8*   HCT 25.5* 26.0*   * 400     CMP:   Recent Labs   Lab 05/08/23  0419 05/09/23  0450    141   K 4.2 4.1    108   CO2 22* 25   * 112*   BUN 33* 24*   CREATININE 1.3 1.0   CALCIUM 8.9 8.8   PROT 6.8 6.6   ALBUMIN 2.3* 2.3*   BILITOT 0.6 0.6   ALKPHOS 91 92   AST 66* 85*   ALT 49* 58*   ANIONGAP 12 8     Cardiac Markers: No results for input(s): CKMB, MYOGLOBIN, BNP, TROPISTAT in the last 48 hours.  Coagulation: No results for input(s): PT, INR, APTT in the last 48 hours.  Lactic Acid: No results for input(s): LACTATE in the last 48 hours.  Magnesium: No results for input(s): MG in the last 48 hours.  Troponin: No results for input(s): TROPONINI, TROPONINIHS in the last 48 hours.  TSH:   Recent Labs   Lab 05/04/23  0639   TSH 2.690     Urine Studies:   No results for input(s): COLORU, APPEARANCEUA, PHUR, SPECGRAV, PROTEINUA,  GLUCUA, KETONESU, BILIRUBINUA, OCCULTUA, NITRITE, UROBILINOGEN, LEUKOCYTESUR, RBCUA, WBCUA, BACTERIA, SQUAMEPITHEL, HYALINECASTS in the last 48 hours.    Invalid input(s): WRIGHTSUR    Imaging Results              MRI Thoracic Spine Without Contrast (Final result)  Result time 05/05/23 19:20:43   Procedure changed from MRI Thoracic Spine W WO Cont     Final result by Tammie Peña MD (05/05/23 19:20:43)                   Impression:      Imaging degraded related to large body habitus.  As visualized no overt bony marrow edema.    Discogenic spondylitic indentation upon the ventral thecal sac lower cervical spine incompletely evaluated.  No overt compromise of the thoracic canal.  Thoracic cord not well discriminated but without overt pathology.    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  No overt extension into the spinal canal.      Electronically signed by: Tammie Peña  Date:    05/05/2023  Time:    19:20               Narrative:    EXAMINATION:  MRI THORACIC SPINE WITHOUT CONTRAST    CLINICAL HISTORY:  back wound concern for osteo;    TECHNIQUE:  Multiplanar, multisequence images were performed through the thoracic spine.  Contrast was not administered.    COMPARISON:  CT correlation                                       US Retroperitoneal Complete (Final result)  Result time 05/04/23 15:45:42      Final result by Chente Guzman MD (05/04/23 15:45:42)                   Impression:      No significant abnormality.      Electronically signed by: Chente Guzman  Date:    05/04/2023  Time:    15:45               Narrative:    EXAMINATION:  US RETROPERITONEAL COMPLETE    CLINICAL HISTORY:  LUISA;    TECHNIQUE:  Ultrasound of the kidneys and urinary bladder was performed including color flow and Doppler evaluation of the kidneys.    COMPARISON:  Abdominal ultrasound May 4, 2023.    FINDINGS:  Right kidney: The right kidney measures 13.7 cm. No cortical thinning.  Resistive  index measures 0.69.  No mass. No renal stone. No hydronephrosis.    Left kidney: The left kidney measures 11.2 cm. No cortical thinning. Resistive index measures 0.7.  No mass. No renal stone. No hydronephrosis.    The bladder is partially distended at the time of scanning and has an unremarkable appearance.                                       MRI Brain Without Contrast (Final result)  Result time 05/04/23 14:18:14      Final result by Delbert Cai MD (05/04/23 14:18:14)                   Impression:      Bilateral small scattered acute infarcts favoring embolic type infarcts.    COMMUNICATION  This critical result was discovered/received at 210 p.m..  The critical information above was relayed directly by me by telephone to Shabana cash LPN on 05/04/2023 at 14:17.      Electronically signed by: Delbert Cai  Date:    05/04/2023  Time:    14:18               Narrative:    EXAMINATION:  MRI BRAIN WITHOUT CONTRAST    CLINICAL HISTORY:  Mental status change, unknown cause;.    TECHNIQUE:  Multiplanar multisequence MR imaging of the brain was performed without contrast.    COMPARISON:  Multiple priors.    FINDINGS:  Intracranial compartment:    Motion severely degrades the exam.    Ventricles and sulci are normal in size for age without evidence of hydrocephalus. No extra-axial blood or fluid collections.    Multiple small scattered acute infarcts some cortical and others within the white matter.  These favor embolic type infarcts.  Infarcts are noted in the bilateral frontal, and parietal lobes and basal ganglia.  Suspected bilateral occipital infarcts.  No mass lesion, acute hemorrhage, edema or acute infarct.    Normal vascular flow voids are preserved.    Skull/extracranial contents (limited evaluation): Bone marrow signal intensity is normal.                                       US Abdomen Limited (Final result)  Result time 05/04/23 09:17:50      Final result by Luis Locke MD (05/04/23 09:17:50)                    Impression:      Technically difficult examination.    Enlarged liver with probable fatty infiltration.    Cholelithiasis without evidence of acute cholecystitis.    Additional findings as above.      Electronically signed by: Luis Locke  Date:    05/04/2023  Time:    09:17               Narrative:    EXAMINATION:  US ABDOMEN LIMITED    CLINICAL HISTORY:  elevated LFTs;.    TECHNIQUE:  Limited ultrasound of the right upper quadrant of the abdomen including pancreas, liver, gallbladder, common bile duct was performed.  Technically difficult examination secondary to patient body positioning and habitus.  Prominent overlying bowel gas.    COMPARISON:  None.    FINDINGS:  Liver: Normal in size, measuring 21.1 cm. Probable fatty liver infiltration. No focal hepatic lesions.  Portal vein patent and demonstrates proper directional flow.    Gallbladder: Gallbladder filled with gallstones creating wall echo shadow sign.  There is no gallbladder wall thickening or pericholecystic fluid.  No reported positive sonographic Collins's sign.    Biliary system: The common duct is not dilated, measuring 4 mm.  No intrahepatic ductal dilatation.    Spleen: Upper limits normal in size with a homogeneous echotexture, measuring 11.1 cm.    Pancreas: The visualized portions of pancreas appear normal.    Miscellaneous: No ascites.                                       US Carotid Bilateral (Final result)  Result time 05/04/23 08:51:37      Final result by Tonya No MD (05/04/23 08:51:37)                   Impression:      No evidence of a hemodynamically significant carotid bifurcation stenosis.      Electronically signed by: Tonya No  Date:    05/04/2023  Time:    08:51               Narrative:    EXAMINATION:  US CAROTID BILATERAL    CLINICAL HISTORY:  AMS;    TECHNIQUE:  Grayscale and color Doppler ultrasound examination of the carotid and vertebral artery systems bilaterally.  Stenosis estimates  are per the NASCET measurement criteria.    COMPARISON:  None.    FINDINGS:  Right:    Internal Carotid Artery (ICA) peak systolic velocity 106 cm/sec    ICA/CCA peak systolic velocity ratio: 1.3    Plaque formation: No significant    Vertebral artery: Antegrade flow and normal waveform.    Left:    Internal Carotid Artery (ICA)  peak systolic velocity 94 cm/sec    ICA/CCA peak systolic velocity ratio: 1.1    Plaque formation: Mild homogeneous    Vertebral artery: Antegrade flow and normal waveform.                                       CT Head Without Contrast (Final result)  Result time 05/03/23 22:50:33      Final result by Tammie Peña MD (05/03/23 22:50:33)                   Impression:      No acute finding as visualized with image degradation      Electronically signed by: Tammie Peña  Date:    05/03/2023  Time:    22:50               Narrative:    EXAMINATION:  CT HEAD WITHOUT CONTRAST    CLINICAL HISTORY:  Mental status change, unknown cause;    TECHNIQUE:  Low dose axial images were obtained through the head.  Coronal and sagittal reformations were also performed. Contrast was not administered.    COMPARISON:  None.    FINDINGS:  Iterative technique for diminishing radiation exposure automated dose    No acute intracranial hemorrhage or mass effect as visualized motion degradation.  No hydrocephalus.  Mild chronic changes.  Right scalp swelling or contusion.  No displaced skull fracture                                       X-Ray Chest AP Portable (Final result)  Result time 05/03/23 22:41:52      Final result by Tammie Peña MD (05/03/23 22:41:52)                   Impression:      No active finding      Electronically signed by: Tammie Peña  Date:    05/03/2023  Time:    22:41               Narrative:    EXAMINATION:  XR CHEST AP PORTABLE    CLINICAL HISTORY:  Sepsis;    TECHNIQUE:  Single frontal portable view of the chest was performed.    COMPARISON:  None    FINDINGS:  No  pulmonary consolidation or sizable pleural effusion                                      Assessment/Plan  * Acute metabolic encephalopathy  Patient found down at home, last well-known two days prior to ED presentation  History of CVA, lives at home with family and apparently able to participate in ADLs including babysitting her grandchild  CT head negative for acute intracranial pathology, however MRI brain revealed bilateral small acute infarcts consistent with embolic stroke  Also found to have back abscess on spine imaging  Metabolic encephalopathy noted on admission apparently multifactorial in etiology secondary to stroke and infectious process   Mentation improved     05/08/2023  Seemingly resolved at today's encounter     Elevated CPK  Elevation likely secondary to muscle breakdown as patient was found down at home for unknown period of time  Trended down with IV fluid     Cerebrovascular accident (CVA) due to embolism  Patient noted to have multiple areas of infarction on both hemispheres concerning for embolic phenomena  Carotid ultrasound showed no significant stenosis      Antithrombotics for secondary stroke prevention: Antiplatelets: Aspirin: 81 mg daily  Clopidogrel: 75 mg daily     Statins for secondary stroke prevention and hyperlipidemia, if present:   Statins: Atorvastatin- 80 mg daily     Aggressive risk factor modification: HTN, DM, Obesity     Rehab efforts: The patient has been evaluated by a stroke team provider and the therapy needs have been fully considered based off the presenting complaints and exam findings. The following therapy evaluations are needed: PT evaluate and treat, OT evaluate and treat, SLP evaluate and treat     Diagnostics ordered/pending: MRA head to assess vasculature, TTE to assess cardiac function/status , TSH to assess thyroid function     VTE prophylaxis: Enoxaparin 40 mg SQ every 24 hours     BP parameters: Infarct: No intervention, SBP <220      Patient is past  permissive hypertension period.  Optimize BP  Patient will need cardiac monitoring given embolic nature of stroke with no evidence of intracardiac shunting     05/08/2023  -ambulated 3 ft with max A and RW  -PT/OT recommended rehab placement on discharge   -SLP recommends dysphagia soft diet with thin liquid    05/09/2023  Referral to rehab pending     Back abscess  Patient found to have drainage of purulence material from her back  CT of thoracic spine confirms abscess, MRI shows no spine involvement  General surgery consulted, recommended neurosurgery evaluation  No surgical intervention recommended  S/p I and D of 10 x 8 cm back abscess by General surgery on 05/07/2023  Wound care  Continue clindamycin     05/08/2023  --echo negative for vegetation, shunting  --Blood cultures NGTD    05/09/2023  Surgery signed off, recommended patient follow up in their office in 2 weeks for drain removal        High anion gap metabolic acidosis  Resolved with IV fluids in setting of LUISA versus LUISA superimposed on CKD  Monitor        Elevated LFTs  , , .  Total bilirubin 1.3 on arrival  Acute hepatitis panel negative  Abdominal ultrasound shows no acute abnormality  LFTs downtrending   Continue to monitor     05/08/2023  --bumped on this AM labs, but likely d/t serum concentration following surgery  --continue to trend daily        Type 2 diabetes mellitus with hyperglycemia  Blood sugar mildly elevated 166 on admission  Hemoglobin A1c 7.1    Accu-Cheks with SSI  Diabetic diet  Optimize diabetic regimen in setting of acute embolic stroke  Unclear if patient is on any meds for diabetes outpatient     Elevated troponin  Troponin elevated 0.25 on admission, in the setting of elevated creatinine 3.5.  Repeat troponin remained flat  EKG NSR with no ST or T-wave changes.    Likely demand ischemia in setting of infectious process and acute embolic stroke  Although patient initially encephalopathic, now that  mentation has improved denies any chest pain     LUISA (acute kidney injury)  05/08/2023  resolved     Severe sepsis  05/08/2023  --leukocytosis down-trending, afebrile  --evidence of infective focus: back abscess  --Blood Cultures (5/3/23)- NGTD  --continue IV clindamycin  --Organ dysfunction indicated by Acute kidney injury, Encephalopathy , and Acute liver injury- all of which has been improving     Morbid obesity with BMI of 45.0-49.9, adult  Body mass index is 48.17 kg/m². Morbid obesity complicates all aspects of disease management from diagnostic modalities to treatment. Weight loss encouraged and health benefits explained to patient.                         VTE Risk Mitigation (From admission, onward)           Ordered       heparin (porcine) injection 5,000 Units  Every 12 hours         05/04/23 0456       IP VTE HIGH RISK PATIENT  Once         05/04/23 0456       Place sequential compression device  Until discontinued         05/04/23 0456       Place sequential compression device  Until discontinued         05/04/23 0043                       Discharge Planning   CRISPIN:      Code Status: Full Code   Is the patient medically ready for discharge?:     Reason for patient still in hospital (select all that apply): Patient trending condition, Treatment and Pending disposition  Discharge Plan A: Rehab              Vin Leonard MD  Department of Hospital Medicine   [facility] - Telemetry ([facility])

## 2023-05-09 NOTE — PT/OT/SLP PROGRESS
Speech Language Pathology Treatment    Patient Name:  Kenia Roper   MRN:  [id number]  Admitting Diagnosis: Acute metabolic encephalopathy    Recommendations:                 General Recommendations:  Dysphagia therapy and Cognitive-linguistic therapy  Diet recommendations:  Soft & Bite Sized Diet - IDDSI Level 6, Liquid Diet Level: Thin liquids - IDDSI Level 0   Aspiration Precautions: Frequent oral care, HOB to 90 degrees, Remain upright 30 minutes post meal, Small bites/sips, and Standard aspiration precautions   General Precautions: Standard, aspiration  Communication strategies:  provide increased time to answer    Subjective     Pt seen bedside for ST.  No c/o pain.  No family present.  Patient goals: to improve strength    Pain/Comfort:  Pain Rating 1: 0/10  Pain Rating Post-Intervention 1: 0/10  Pain Rating 2: 0/10  Pain Rating Post-Intervention 2: 0/10    Respiratory Status: Room air    Objective:     Has the patient been evaluated by SLP for swallowing?   Yes  Keep patient NPO? No   Current Respiratory Status: room air    Pt diet f/u:  No overt s/s of dysphagia with tx feeds and recommended for continued IDDSI 6 (soft/bite sized solids) with IDDSI 0 (thin liquids).    Pt's communication/sentence production improving for conversational tasks.    Assessment:     Kenia Roper is a 47 y.o. female with an SLP diagnosis of suspected Dysphagia and Cognitive-communicative Impairment in the setting of AMS with dx acute bilateral embolic infarcts (See MRI 5/4/23).  She presents with improved mentation and recommended for IDDSI 6 (soft/bite sized solids) with IDDSI 0 (thin liquids), following aspiration precautions and set-up/meal assist.  Pt recommended for IP rehab post-acute for cognitive-communicative intervention    Goals:   Multidisciplinary Problems       SLP Goals          Problem: SLP    Goal Priority Disciplines Outcome   SLP Goal     SLP Ongoing, Progressing   Description: 1.  Pt will consume the least  restrictive PO diet with ongoing bedside assessment as mentation improves. MET 5/5/23--IDDSI 6/IDDSI 0 recommended.  1a. Pt will consume IDDSI 6/IDDSI 0 without incident.  1b. Pt will improve efficiency for diet advancement to regular solids (IDDSI 7).  2.  Pt will improve cognitive-linguistic skills to meet basic/functional communicative needs related to orientation, memory recall and judgment/reasoning.                       Plan:     Patient to be seen:  2 x/week, 3 x/week   Plan of Care expires:  05/11/23  Plan of Care reviewed with:  patient   SLP Follow-Up:  Yes       Discharge recommendations:  rehabilitation facility   Barriers to Discharge:  None    Time Tracking:     SLP Treatment Date:   05/09/23  Speech Start Time:  1430  Speech Stop Time:  1500     Speech Total Time (min):  30 min    Billable Minutes: Speech Therapy Individual 15 minutes    05/08/2023

## 2023-05-09 NOTE — PT/OT/SLP PROGRESS
Speech Language Pathology Treatment    Patient Name:  Kenia Roper   MRN:  [id number]  Admitting Diagnosis: Acute metabolic encephalopathy    Recommendations:                 General Recommendations:  Dysphagia therapy and Cognitive-linguistic therapy  Diet recommendations:  Soft & Bite Sized Diet - IDDSI Level 6, Liquid Diet Level: Thin liquids - IDDSI Level 0   Aspiration Precautions: Feed only when awake/alert, Frequent oral care, HOB to 90 degrees, Remain upright 30 minutes post meal, Small bites/sips, and Standard aspiration precautions   General Precautions: Standard, aspiration  Communication strategies:  provide increased time to answer    Subjective     Pt seen bedside for ST.  Resting in bed.  No c/o pain.  No family present.  Awaiting disposition plan.  Patient goals: to improve strength    Pain/Comfort:  Pain Rating 1: 0/10  Pain Rating Post-Intervention 1: 0/10  Pain Rating 2: 0/10  Pain Rating Post-Intervention 2: 0/10    Respiratory Status: Room air    Objective:     Has the patient been evaluated by SLP for swallowing?   Yes  Keep patient NPO? No   Current Respiratory Status: room air     Pt with improved speech/intelligibility during functional conversational speech tasks; however, rate is reduced.  Pt oriented with improved awareness and problem solving.  Impaired recent memory recall.      Pt consuming IDDSI 6 (soft/bite sized solids) with IDDSI 0 (thin liquids) without incident and prefers to continue soft solids at this time s/t reduced oral efficiency.    Assessment:     Kenia Roper is a 47 y.o. female with an SLP diagnosis of suspected Dysphagia and Cognitive-communicative Impairment in the setting of AMS with dx acute bilateral embolic infarcts (See MRI 5/4/23).  She presents with improving communication and recommended for IDDSI 6 (soft/bite sized solids) with IDDSI 0 (thin liquids), following aspiration precautions and set-up/meal assist.  Pt recommended for IP rehab post-acute for  cognitive-communicative intervention.    Goals:   Multidisciplinary Problems       SLP Goals          Problem: SLP    Goal Priority Disciplines Outcome   SLP Goal     SLP Ongoing, Progressing   Description: 1.  Pt will consume the least restrictive PO diet with ongoing bedside assessment as mentation improves. MET 5/5/23--IDDSI 6/IDDSI 0 recommended.  1a. Pt will consume IDDSI 6/IDDSI 0 without incident.  1b. Pt will improve efficiency for diet advancement to regular solids (IDDSI 7).  2.  Pt will improve cognitive-linguistic skills to meet basic/functional communicative needs related to orientation, memory recall and judgment/reasoning.                       Plan:     Patient to be seen:  2 x/week, 3 x/week   Plan of Care expires:  05/11/23  Plan of Care reviewed with:  patient   SLP Follow-Up:  Yes       Discharge recommendations:  rehabilitation facility   Barriers to Discharge:  None    Time Tracking:     SLP Treatment Date:   05/09/23  Speech Start Time:  1430  Speech Stop Time:  1500     Speech Total Time (min):  30 min    Billable Minutes: Speech Therapy Individual 15 minutes and Treatment Swallowing Dysfunction 15 minutes    05/09/2023

## 2023-05-09 NOTE — PLAN OF CARE
SW spoke to [facility]  - Luli Tierney, about rehabilitation referral. SW inquired about if Patient would be able to make own decision, due to situation. Per [facility] , patient could make own decision and she would research the rehab facilities and call SW back with which one looks more desirable for Patient.   SW went and meet with Patient at bedside to discuss rehabilitation referral. Patient was agreeable to rehab placement and was agreeable to having SW discuss with [facility] .   [facility]  will call SW back with preferences. SW sent referral to see who would be willing to accept.    SW will continue to follow and assist as needed.

## 2023-05-09 NOTE — PLAN OF CARE
Problem: Respiratory Compromise (Stroke, Ischemic/Transient Ischemic Attack)  Goal: Effective Oxygenation and Ventilation  Outcome: Ongoing, Progressing     Problem: Functional Ability Impaired (Stroke, Ischemic/Transient Ischemic Attack)  Goal: Optimal Functional Ability  Outcome: Ongoing, Progressing

## 2023-05-10 LAB
ALBUMIN SERPL BCP-MCNC: 2.6 G/DL (ref 3.5–5.2)
ALP SERPL-CCNC: 105 U/L (ref 55–135)
ALT SERPL W/O P-5'-P-CCNC: 73 U/L (ref 10–44)
ANION GAP SERPL CALC-SCNC: 10 MMOL/L (ref 8–16)
AST SERPL-CCNC: 88 U/L (ref 10–40)
BASOPHILS # BLD AUTO: 0.06 K/UL (ref 0–0.2)
BASOPHILS NFR BLD: 0.5 % (ref 0–1.9)
BILIRUB SERPL-MCNC: 0.6 MG/DL (ref 0.1–1)
BUN SERPL-MCNC: 18 MG/DL (ref 6–20)
CALCIUM SERPL-MCNC: 9.2 MG/DL (ref 8.7–10.5)
CHLORIDE SERPL-SCNC: 107 MMOL/L (ref 95–110)
CO2 SERPL-SCNC: 23 MMOL/L (ref 23–29)
CREAT SERPL-MCNC: 1 MG/DL (ref 0.5–1.4)
DIFFERENTIAL METHOD: ABNORMAL
EOSINOPHIL # BLD AUTO: 0.2 K/UL (ref 0–0.5)
EOSINOPHIL NFR BLD: 1.5 % (ref 0–8)
ERYTHROCYTE [DISTWIDTH] IN BLOOD BY AUTOMATED COUNT: 15.7 % (ref 11.5–14.5)
EST. GFR  (NO RACE VARIABLE): >60 ML/MIN/1.73 M^2
GLUCOSE SERPL-MCNC: 125 MG/DL (ref 70–110)
HCT VFR BLD AUTO: 27.5 % (ref 37–48.5)
HGB BLD-MCNC: 8.5 G/DL (ref 12–16)
IMM GRANULOCYTES # BLD AUTO: 0.62 K/UL (ref 0–0.04)
IMM GRANULOCYTES NFR BLD AUTO: 4.7 % (ref 0–0.5)
LYMPHOCYTES # BLD AUTO: 2.3 K/UL (ref 1–4.8)
LYMPHOCYTES NFR BLD: 17.3 % (ref 18–48)
MCH RBC QN AUTO: 21.6 PG (ref 27–31)
MCHC RBC AUTO-ENTMCNC: 30.9 G/DL (ref 32–36)
MCV RBC AUTO: 70 FL (ref 82–98)
MONOCYTES # BLD AUTO: 0.5 K/UL (ref 0.3–1)
MONOCYTES NFR BLD: 3.8 % (ref 4–15)
NEUTROPHILS # BLD AUTO: 9.5 K/UL (ref 1.8–7.7)
NEUTROPHILS NFR BLD: 72.2 % (ref 38–73)
NRBC BLD-RTO: 0 /100 WBC
PLATELET # BLD AUTO: 414 K/UL (ref 150–450)
PMV BLD AUTO: 10.4 FL (ref 9.2–12.9)
POCT GLUCOSE: 132 MG/DL (ref 70–110)
POCT GLUCOSE: 137 MG/DL (ref 70–110)
POCT GLUCOSE: 157 MG/DL (ref 70–110)
POCT GLUCOSE: 165 MG/DL (ref 70–110)
POTASSIUM SERPL-SCNC: 4.4 MMOL/L (ref 3.5–5.1)
PROT SERPL-MCNC: 7.2 G/DL (ref 6–8.4)
RBC # BLD AUTO: 3.93 M/UL (ref 4–5.4)
SODIUM SERPL-SCNC: 140 MMOL/L (ref 136–145)
WBC # BLD AUTO: 13.14 K/UL (ref 3.9–12.7)

## 2023-05-10 PROCEDURE — 63600175 PHARM REV CODE 636 W HCPCS: Performed by: INTERNAL MEDICINE

## 2023-05-10 PROCEDURE — 92507 TX SP LANG VOICE COMM INDIV: CPT

## 2023-05-10 PROCEDURE — 25000003 PHARM REV CODE 250: Performed by: STUDENT IN AN ORGANIZED HEALTH CARE EDUCATION/TRAINING PROGRAM

## 2023-05-10 PROCEDURE — 85025 COMPLETE CBC W/AUTO DIFF WBC: CPT | Performed by: INTERNAL MEDICINE

## 2023-05-10 PROCEDURE — 80053 COMPREHEN METABOLIC PANEL: CPT | Performed by: INTERNAL MEDICINE

## 2023-05-10 PROCEDURE — 21400001 HC TELEMETRY ROOM

## 2023-05-10 PROCEDURE — 25000003 PHARM REV CODE 250: Performed by: INTERNAL MEDICINE

## 2023-05-10 PROCEDURE — 92526 ORAL FUNCTION THERAPY: CPT

## 2023-05-10 PROCEDURE — 36415 COLL VENOUS BLD VENIPUNCTURE: CPT | Performed by: INTERNAL MEDICINE

## 2023-05-10 PROCEDURE — 25000003 PHARM REV CODE 250: Performed by: ANESTHESIOLOGY

## 2023-05-10 RX ADMIN — CLINDAMYCIN PHOSPHATE 600 MG: 600 INJECTION, SOLUTION INTRAVENOUS at 03:05

## 2023-05-10 RX ADMIN — HEPARIN SODIUM 5000 UNITS: 5000 INJECTION INTRAVENOUS; SUBCUTANEOUS at 09:05

## 2023-05-10 RX ADMIN — CLOPIDOGREL BISULFATE 75 MG: 75 TABLET ORAL at 09:05

## 2023-05-10 RX ADMIN — OXYCODONE HYDROCHLORIDE AND ACETAMINOPHEN 1 TABLET: 5; 325 TABLET ORAL at 10:05

## 2023-05-10 RX ADMIN — CLINDAMYCIN PHOSPHATE 600 MG: 600 INJECTION, SOLUTION INTRAVENOUS at 10:05

## 2023-05-10 RX ADMIN — ATORVASTATIN CALCIUM 80 MG: 40 TABLET, FILM COATED ORAL at 09:05

## 2023-05-10 RX ADMIN — HYDRALAZINE HYDROCHLORIDE 10 MG: 20 INJECTION, SOLUTION INTRAMUSCULAR; INTRAVENOUS at 10:05

## 2023-05-10 RX ADMIN — SODIUM CHLORIDE: 9 INJECTION, SOLUTION INTRAVENOUS at 03:05

## 2023-05-10 RX ADMIN — AMLODIPINE BESYLATE 10 MG: 10 TABLET ORAL at 09:05

## 2023-05-10 RX ADMIN — SODIUM CHLORIDE: 9 INJECTION, SOLUTION INTRAVENOUS at 09:05

## 2023-05-10 RX ADMIN — CLINDAMYCIN PHOSPHATE 600 MG: 600 INJECTION, SOLUTION INTRAVENOUS at 09:05

## 2023-05-10 RX ADMIN — ASPIRIN 81 MG CHEWABLE TABLET 81 MG: 81 TABLET CHEWABLE at 09:05

## 2023-05-10 NOTE — PT/OT/SLP PROGRESS
Occupational Therapy      Patient Name:  Kenia Roper   MRN:  [id number]    Patient not seen today at 3:25 PM secondary to pt soiled and waiting to get cleaned. Will follow-up at next available opportunity.    5/10/2023  Karolyn Salcedo, DRE   [id number]

## 2023-05-10 NOTE — PROGRESS NOTES
[facility] Medicine  Progress Note     Patient Name: Kenia Roper  MRN: [id number]  Patient Class: IP- Inpatient     Admission Date: 5/3/2023  Length of Stay: 6 days  Attending Physician: Vin Leonard MD  Primary Care Provider: Ross Perdomo MD           Subjective:      Principal Problem:Acute metabolic encephalopathy           HPI:  Ms. Roper is a 57-year-old morbidly obese  female, was brought in to the ED in an unresponsive state.  No family at the bedside, unable to obtain any information from patient.  No information per Care everywhere.  Most of the information obtained from ED staff.  Per report, patient was found down at home, covered in feces and urine.  Last known well two days ago.  No report of fever or chills.  Known history of CVA and type 2 diabetes mellitus.  In the ED she is afebrile, mildly tachycardic HR .  WBC elevated 20,000. BUN 60, creatinine 3.5 (unknown baseline).  Elevated LFTs in the 400s.  Troponin elevated 0.259, unknown if she has chest pain or SOB.  EKG NSR with no ST T wave changes.  Hemodynamically stable.  Presumed severe sepsis with LUISA/AMS, she received 30 mL/kilogram bolus in the ED along with IV Zosyn empirically.  Cultures obtained.  UDS positive for opiates.  CT head is negative for acute intracranial pathology.  CXR negative for infectious process.    Admitting diagnosis:  Severe sepsis.  Acute encephalopathy (TIA versus uremia).  LUISA.  Elevated troponin.  Elevated LFTs.        Overview/Hospital Course:  MRI brain revealed bilateral small scattered acute infarcts favoring embolic type infarcts.  Neurology consulted     Upon further examination, patient was found to have a large middle back abscess with purulent drainage.  CT of thoracic spine done without contrast showed subcutaneous on organized air-fluid collection dorsally consistent with clinical abscess or gas producing infection with no overt underlying bony  "change or involvement of the spinal canal.  Patient started on empiric antibiotics with Zosyn and clindamycin.  General surgery consulted and recommended MRI of thoracic spine and Neurosurgery consultation.  MRI of thoracic spine showed no overt bony marrow edema and no overt compromise of the thoracic canal, subcutaneous abscess or phlegmon with cutaneous fistula with no overt extension into the spinal canal.     Neurosurgery recommends no neurosurgical intervention as there is no evidence of cord compromise.  General surgery performed I&D of back abscess on 05/07/2023.  Blood cultures NGTD     Echocardiogram showed LVEF 55%, normal left ventricular diastolic function, no evidence of intracardiac shunting.  She will need cardiac monitoring to rule out AFib     Mentation improved, patient awake, alert, oriented although notable to have lower extremity weakness and difficulty with word finding.  Neurology consulted and recommended MRA brain which showed degraded image, scattered bilateral foci acute ischemia supratentorial similar to MRI brain, apparent relative diminished flow of left MCA which may be partially occlusive, otherwise no large vessel occlusion or critical stenosis noted.     PT/OT/SLP recommended rehab placement on discharge.  SLP also recommends patient be placed on soft and bite size diet (IDDSI level 6), with thin liquids     Case management working on placement for discharge    05/10/2023  Gen-surgery signed off, recommended OP f/u in their clinic in two weeks for drain removal. Rehab referral pending.         Interval History  Stable. NAEON. Orientation still at baseline    Objective  BP (!) 126/59 (BP Location: Right arm, Patient Position: Lying)   Pulse 82   Temp 98.2 °F (36.8 °C) (Oral)   Resp 18   Ht 5' 4" (1.626 m)   Wt 124.2 kg (273 lb 13 oz)   LMP  (LMP Unknown)   SpO2 99%   Breastfeeding No   BMI 47.00 kg/m²     Intake/Output Summary (Last 24 hours) at 5/10/2023 1414  Last data " filed at 5/10/2023 0809  Gross per 24 hour   Intake 388.49 ml   Output --   Net 388.49 ml       PHYSICAL EXAM  Constitutional:       General: She is not in acute distress.     Appearance: She is morbidly obese.   Cardiovascular:      Rate and Rhythm: Normal rate and regular rhythm.   Pulmonary:      Effort: Pulmonary effort is normal. No accessory muscle usage or respiratory distress.      Breath sounds: No wheezing.   Abdominal:      General: Bowel sounds are normal. There is no distension.      Palpations: Abdomen is soft.      Tenderness: There is no abdominal tenderness. There is no guarding or rebound.   Musculoskeletal:      Cervical back: Neck supple.      Comments: Moving both lower extremities in bed   Skin:     General: Skin is warm and dry.   Neurological:      Mental Status: She is alert and oriented to person, place, and time.   Psychiatric:         Mood and Affect: Affect is flat.          BMP:   Recent Labs   Lab 05/10/23  0504   *      K 4.4      CO2 23   BUN 18   CREATININE 1.0   CALCIUM 9.2     CBC:   Recent Labs   Lab 05/09/23  0450 05/10/23  0504   WBC 11.07 13.14*   HGB 7.8* 8.5*   HCT 26.0* 27.5*    414     CMP:   Recent Labs   Lab 05/09/23  0450 05/10/23  0504    140   K 4.1 4.4    107   CO2 25 23   * 125*   BUN 24* 18   CREATININE 1.0 1.0   CALCIUM 8.8 9.2   PROT 6.6 7.2   ALBUMIN 2.3* 2.6*   BILITOT 0.6 0.6   ALKPHOS 92 105   AST 85* 88*   ALT 58* 73*   ANIONGAP 8 10     Cardiac Markers: No results for input(s): CKMB, MYOGLOBIN, BNP, TROPISTAT in the last 48 hours.  Coagulation: No results for input(s): PT, INR, APTT in the last 48 hours.  Lactic Acid: No results for input(s): LACTATE in the last 48 hours.  Magnesium: No results for input(s): MG in the last 48 hours.  Troponin: No results for input(s): TROPONINI, TROPONINIHS in the last 48 hours.  TSH:   Recent Labs   Lab 05/04/23  0639   TSH 2.690     Urine Studies:   No results for input(s):  COLORU, APPEARANCEUA, PHUR, SPECGRAV, PROTEINUA, GLUCUA, KETONESU, BILIRUBINUA, OCCULTUA, NITRITE, UROBILINOGEN, LEUKOCYTESUR, RBCUA, WBCUA, BACTERIA, SQUAMEPITHEL, HYALINECASTS in the last 48 hours.    Invalid input(s): WRIGHTSUR    Imaging Results              MRI Thoracic Spine Without Contrast (Final result)  Result time 05/05/23 19:20:43   Procedure changed from MRI Thoracic Spine W WO Cont     Final result by Tammie Peña MD (05/05/23 19:20:43)                   Impression:      Imaging degraded related to large body habitus.  As visualized no overt bony marrow edema.    Discogenic spondylitic indentation upon the ventral thecal sac lower cervical spine incompletely evaluated.  No overt compromise of the thoracic canal.  Thoracic cord not well discriminated but without overt pathology.    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  No overt extension into the spinal canal.      Electronically signed by: Tammie Peña  Date:    05/05/2023  Time:    19:20               Narrative:    EXAMINATION:  MRI THORACIC SPINE WITHOUT CONTRAST    CLINICAL HISTORY:  back wound concern for osteo;    TECHNIQUE:  Multiplanar, multisequence images were performed through the thoracic spine.  Contrast was not administered.    COMPARISON:  CT correlation                                       US Retroperitoneal Complete (Final result)  Result time 05/04/23 15:45:42      Final result by Chente Guzman MD (05/04/23 15:45:42)                   Impression:      No significant abnormality.      Electronically signed by: Chente Guzman  Date:    05/04/2023  Time:    15:45               Narrative:    EXAMINATION:  US RETROPERITONEAL COMPLETE    CLINICAL HISTORY:  LUISA;    TECHNIQUE:  Ultrasound of the kidneys and urinary bladder was performed including color flow and Doppler evaluation of the kidneys.    COMPARISON:  Abdominal ultrasound May 4, 2023.    FINDINGS:  Right kidney: The right kidney  measures 13.7 cm. No cortical thinning.  Resistive index measures 0.69.  No mass. No renal stone. No hydronephrosis.    Left kidney: The left kidney measures 11.2 cm. No cortical thinning. Resistive index measures 0.7.  No mass. No renal stone. No hydronephrosis.    The bladder is partially distended at the time of scanning and has an unremarkable appearance.                                       MRI Brain Without Contrast (Final result)  Result time 05/04/23 14:18:14      Final result by Delbert Cai MD (05/04/23 14:18:14)                   Impression:      Bilateral small scattered acute infarcts favoring embolic type infarcts.    COMMUNICATION  This critical result was discovered/received at 210 p.m..  The critical information above was relayed directly by me by telephone to Shabana cash LPN on 05/04/2023 at 14:17.      Electronically signed by: Delbert Cai  Date:    05/04/2023  Time:    14:18               Narrative:    EXAMINATION:  MRI BRAIN WITHOUT CONTRAST    CLINICAL HISTORY:  Mental status change, unknown cause;.    TECHNIQUE:  Multiplanar multisequence MR imaging of the brain was performed without contrast.    COMPARISON:  Multiple priors.    FINDINGS:  Intracranial compartment:    Motion severely degrades the exam.    Ventricles and sulci are normal in size for age without evidence of hydrocephalus. No extra-axial blood or fluid collections.    Multiple small scattered acute infarcts some cortical and others within the white matter.  These favor embolic type infarcts.  Infarcts are noted in the bilateral frontal, and parietal lobes and basal ganglia.  Suspected bilateral occipital infarcts.  No mass lesion, acute hemorrhage, edema or acute infarct.    Normal vascular flow voids are preserved.    Skull/extracranial contents (limited evaluation): Bone marrow signal intensity is normal.                                       US Abdomen Limited (Final result)  Result time 05/04/23 09:17:50      Final  result by Luis Locke MD (05/04/23 09:17:50)                   Impression:      Technically difficult examination.    Enlarged liver with probable fatty infiltration.    Cholelithiasis without evidence of acute cholecystitis.    Additional findings as above.      Electronically signed by: Luis Locke  Date:    05/04/2023  Time:    09:17               Narrative:    EXAMINATION:  US ABDOMEN LIMITED    CLINICAL HISTORY:  elevated LFTs;.    TECHNIQUE:  Limited ultrasound of the right upper quadrant of the abdomen including pancreas, liver, gallbladder, common bile duct was performed.  Technically difficult examination secondary to patient body positioning and habitus.  Prominent overlying bowel gas.    COMPARISON:  None.    FINDINGS:  Liver: Normal in size, measuring 21.1 cm. Probable fatty liver infiltration. No focal hepatic lesions.  Portal vein patent and demonstrates proper directional flow.    Gallbladder: Gallbladder filled with gallstones creating wall echo shadow sign.  There is no gallbladder wall thickening or pericholecystic fluid.  No reported positive sonographic Collins's sign.    Biliary system: The common duct is not dilated, measuring 4 mm.  No intrahepatic ductal dilatation.    Spleen: Upper limits normal in size with a homogeneous echotexture, measuring 11.1 cm.    Pancreas: The visualized portions of pancreas appear normal.    Miscellaneous: No ascites.                                       US Carotid Bilateral (Final result)  Result time 05/04/23 08:51:37      Final result by Tonya No MD (05/04/23 08:51:37)                   Impression:      No evidence of a hemodynamically significant carotid bifurcation stenosis.      Electronically signed by: Tonya No  Date:    05/04/2023  Time:    08:51               Narrative:    EXAMINATION:  US CAROTID BILATERAL    CLINICAL HISTORY:  AMS;    TECHNIQUE:  Grayscale and color Doppler ultrasound examination of the carotid and  vertebral artery systems bilaterally.  Stenosis estimates are per the NASCET measurement criteria.    COMPARISON:  None.    FINDINGS:  Right:    Internal Carotid Artery (ICA) peak systolic velocity 106 cm/sec    ICA/CCA peak systolic velocity ratio: 1.3    Plaque formation: No significant    Vertebral artery: Antegrade flow and normal waveform.    Left:    Internal Carotid Artery (ICA)  peak systolic velocity 94 cm/sec    ICA/CCA peak systolic velocity ratio: 1.1    Plaque formation: Mild homogeneous    Vertebral artery: Antegrade flow and normal waveform.                                       CT Head Without Contrast (Final result)  Result time 05/03/23 22:50:33      Final result by Tammie Peña MD (05/03/23 22:50:33)                   Impression:      No acute finding as visualized with image degradation      Electronically signed by: Tammie Peña  Date:    05/03/2023  Time:    22:50               Narrative:    EXAMINATION:  CT HEAD WITHOUT CONTRAST    CLINICAL HISTORY:  Mental status change, unknown cause;    TECHNIQUE:  Low dose axial images were obtained through the head.  Coronal and sagittal reformations were also performed. Contrast was not administered.    COMPARISON:  None.    FINDINGS:  Iterative technique for diminishing radiation exposure automated dose    No acute intracranial hemorrhage or mass effect as visualized motion degradation.  No hydrocephalus.  Mild chronic changes.  Right scalp swelling or contusion.  No displaced skull fracture                                       X-Ray Chest AP Portable (Final result)  Result time 05/03/23 22:41:52      Final result by Tammie Peña MD (05/03/23 22:41:52)                   Impression:      No active finding      Electronically signed by: Tammie Peña  Date:    05/03/2023  Time:    22:41               Narrative:    EXAMINATION:  XR CHEST AP PORTABLE    CLINICAL HISTORY:  Sepsis;    TECHNIQUE:  Single frontal portable view of the  chest was performed.    COMPARISON:  None    FINDINGS:  No pulmonary consolidation or sizable pleural effusion                                      Assessment/Plan  Cerebrovascular accident (CVA) due to embolism  Residual deficits      05/08/2023  -ambulated 3 ft with max A and RW  -PT/OT recommended rehab placement on discharge   -SLP recommends dysphagia soft diet with thin liquid     05/09/2023  Referral to rehab pending    05/10/2023  --SLP: Soft & Bite Sized Diet - IDDSI Level 6, Liquid Diet Level: Thin liquids - IDDSI Level 0   --PT: ambulated 3 ft min A with RW  --rehab still recommended for post acute therapy  --[facility] vs [facility], will follow up with CM/SW    * Acute metabolic encephalopathy  Patient found down at home, last well-known two days prior to ED presentation  History of CVA, lives at home with family and apparently able to participate in ADLs including babysitting her grandchild  CT head negative for acute intracranial pathology, however MRI brain revealed bilateral small acute infarcts consistent with embolic stroke  Also found to have back abscess on spine imaging  Metabolic encephalopathy noted on admission apparently multifactorial in etiology secondary to stroke and infectious process   Mentation improved     05/08/2023  Seemingly resolved at today's encounter  05/10/2023  Continues to be resolved, no signs of recurrence     Elevated CPK  Elevation likely secondary to muscle breakdown as patient was found down at home for unknown period of time  Trended down with IV fluid        Back abscess  Patient found to have drainage of purulence material from her back  CT of thoracic spine confirms abscess, MRI shows no spine involvement  General surgery consulted, recommended neurosurgery evaluation  No surgical intervention recommended  S/p I and D of 10 x 8 cm back abscess by General surgery on 05/07/2023  Wound care  Continue clindamycin     05/08/2023  --echo negative for vegetation, shunting  --Blood  cultures NGTD     05/09/2023  Surgery signed off, recommended patient follow up in their office in 2 weeks for drain removal        High anion gap metabolic acidosis  Resolved with IV fluids in setting of LUISA versus LUISA superimposed on CKD  Monitor        Elevated LFTs  , , .  Total bilirubin 1.3 on arrival  Acute hepatitis panel negative  Abdominal ultrasound shows no acute abnormality  LFTs downtrending   Continue to monitor     05/08/2023  --bumped on this AM labs, but likely d/t serum concentration following surgery  --continue to trend daily        Type 2 diabetes mellitus with hyperglycemia  Blood sugar mildly elevated 166 on admission  Hemoglobin A1c 7.1    Accu-Cheks with SSI  Diabetic diet  Optimize diabetic regimen in setting of acute embolic stroke  Unclear if patient is on any meds for diabetes outpatient     Elevated troponin  Troponin elevated 0.25 on admission, in the setting of elevated creatinine 3.5.  Repeat troponin remained flat  EKG NSR with no ST or T-wave changes.    Likely demand ischemia in setting of infectious process and acute embolic stroke  Although patient initially encephalopathic, now that mentation has improved denies any chest pain     LUISA (acute kidney injury)  05/08/2023  resolved     Severe sepsis  05/08/2023  --leukocytosis down-trending, afebrile  --evidence of infective focus: back abscess  --Blood Cultures (5/3/23)- NGTD  --continue IV clindamycin  --Organ dysfunction indicated by Acute kidney injury, Encephalopathy , and Acute liver injury- all of which has been improving     Morbid obesity with BMI of 45.0-49.9, adult  Body mass index is 48.17 kg/m². Morbid obesity complicates all aspects of disease management from diagnostic modalities to treatment. Weight loss encouraged and health benefits explained to patient.                         VTE Risk Mitigation (From admission, onward)           Ordered       heparin (porcine) injection 5,000 Units  Every  12 hours         05/04/23 0456       IP VTE HIGH RISK PATIENT  Once         05/04/23 0456       Place sequential compression device  Until discontinued         05/04/23 0456       Place sequential compression device  Until discontinued         05/04/23 0043                          Discharge Planning   CRISPIN:      Code Status: Full Code   Is the patient medically ready for discharge?:     Reason for patient still in hospital (select all that apply): Patient trending condition, Treatment and Pending disposition  Discharge Plan A: Rehab        Vin Leonard MD  Department of Hospital Medicine   [facility] ([facility])

## 2023-05-10 NOTE — PLAN OF CARE
FELIX received call from [facility] regarding Rehab referral. Per [facility], with  Rehab, they needed to clarify about back drain for wound and for IV clindamycin. FELIX messaged MD, who stated drain would need to stay for 2 additional weeks and then be taken out - in outpatient clinic. [facility] also stated that they would not be able to accept until Friday. Per MD, patient is clinically ready and could discharge once we have placement.    FELIX reached out to [facility] about referral. [facility] about safe discharge plan once rehab was finished. FELIX stated Patient did live with daughter, who was unreliable, but she did have a place to go once discharged. Yi inquired about any additional contact. [facility]. Yi will reach out to [facility]  for additional information for further review.     FELIX will continue to follow and assist as needed.

## 2023-05-10 NOTE — PT/OT/SLP PROGRESS
Physical Therapy      Patient Name:  Kenia Roper   MRN:  [id number]    Chart review performed and attempted but pt with PCT for hygiene. Will follow-up as able/appropriate.

## 2023-05-10 NOTE — PLAN OF CARE
Nutrition Recommendations 5/10:  1. Recommend modify pt's PO diet to Diabetic, Cardiac, Dysphagia soft and bite-sized IDDSI Level 6 diet   2. Recommend Banatrol TID to assist with diarrhea   3. Recommend weekly weights   4. Collaboration of care with medical providers     Goals:  1. Pt will continue to tolerate and consume >75% PO intake of meals by RD follow up   2. Pt diarrhea will improve by RD follow up     Sumi Loera, Registration Eligible, Provisional LDN

## 2023-05-10 NOTE — PLAN OF CARE
SW attempted to contact Patient DCFS , Luli Tierney, twice, to discuss rehab referral. Patient's DCFS , Luli Tierney, did not answer either times and SW was unable to leave message.

## 2023-05-10 NOTE — PLAN OF CARE
Pt AAOx4. NSR on the heart monitor. On room air; tolerating well. Pt incontinent; brief in place. Pt turned and repositioned with use of pillows and wedge. Midline CDI with no redness, swelling, warmth, or drainage. Dressing changed and CDI. Blood sugar monitored. Bed low, wheels locked, alarms audible. Plan of care reviewed. Vital signs monitored. Fall precautions in place. Pain assessed. Safety promoted. Infection risks reduced.

## 2023-05-10 NOTE — PLAN OF CARE
Pt AAOx4. NSR on the heart monitor. On room air; tolerating well. Pt incontinent; brief in place. Pt turned and repositioned with use of pillows and wedge. PIV CDI with no redness, swelling, warmth, or drainage. Dressing changed and CDI. Blood sugar monitored. Bed low, wheels locked, alarms audible. Plan of care reviewed. Vital signs monitored. Fall precautions in place. Pain assessed. Safety promoted. Infection risks reduced.

## 2023-05-10 NOTE — PT/OT/SLP PROGRESS
"Speech Language Pathology Treatment    Patient Name:  Kenia Roper   MRN:  [id number]  Admitting Diagnosis: Acute metabolic encephalopathy    Recommendations:                 General Recommendations:  Cognitive-linguistic therapy  Diet recommendations:  Soft & Bite Sized Diet - IDDSI Level 6, Liquid Diet Level: Thin liquids - IDDSI Level 0   Aspiration Precautions: Frequent oral care and Standard aspiration precautions   General Precautions: Standard, aspiration  Communication strategies:   mild dysarthria, careful listening needed    Subjective     Pt seen bedside for ST.  Reported being tired.  C/o back discomfort.  No family present.  Patient goals: to go home    Pain/Comfort:  Pain Rating 1: 0/10  Pain Rating Post-Intervention 1: 0/10  Pain Rating 2: 0/10  Pain Rating Post-Intervention 2: 0/10    Respiratory Status: Room air    Objective:     Has the patient been evaluated by SLP for swallowing?   Yes  Keep patient NPO? No   Current Respiratory Status: room air    Discussed POC for rehab with pt.  Pt with poor insight into deficits and impaired memory recall.  Pt unable to provide details on current medical plan or information related to staff conversations regarding dx, deficits/limitations, medications or disposition. Vague/concrete responses--pt stated she "has stuff to get done" at home and shouldn't be away from her daughter or grandson for long, but unable to elaborate further. Pt is also unable to ID me, and I have seen her almost daily since admission.  Recommended cognitive diagnostic/standardized assessment.    Mild left facial weakness persists with slowed mastication. Continue IDDSI 6/IDDSI 0, per pt request.    Assessment:     Kenia Roper is a 47 y.o. female with an SLP diagnosis of suspected Dysphagia and Cognitive-communicative Impairment in the setting of AMS with dx acute bilateral embolic infarcts (See MRI 5/4/23).  She presents with improving communication and recommended for IDDSI 6 " (soft/bite sized solids) with IDDSI 0 (thin liquids), following aspiration precautions and set-up/meal assist.  Pt recommended for IP rehab post-acute for cognitive-communicative intervention.    Goals:   Multidisciplinary Problems       SLP Goals          Problem: SLP    Goal Priority Disciplines Outcome   SLP Goal     SLP Ongoing, Progressing   Description: 1.  Pt will consume the least restrictive PO diet with ongoing bedside assessment as mentation improves. MET 5/5/23--IDDSI 6/IDDSI 0 recommended.  1a. Pt will consume IDDSI 6/IDDSI 0 without incident.  1b. Pt will improve efficiency for diet advancement to regular solids (IDDSI 7).  2.  Pt will improve cognitive-linguistic skills to meet basic/functional communicative needs related to orientation, memory recall and judgment/reasoning.                       Plan:     Patient to be seen:  2 x/week, 3 x/week   Plan of Care expires:  05/11/23  Plan of Care reviewed with:  patient   SLP Follow-Up:  Yes       Discharge recommendations:  rehabilitation facility   Barriers to Discharge:  Decreased Care Giver Support    Time Tracking:     SLP Treatment Date:   05/10/23  Speech Start Time:  1600  Speech Stop Time:  1630     Speech Total Time (min):  30 min    Billable Minutes: Speech Therapy Individual 15 minutes and Treatment Swallowing Dysfunction 15 minutes    05/10/2023

## 2023-05-10 NOTE — PROGRESS NOTES
"[facility] - Telemetry ([facility])  Adult Nutrition  Progress Note    SUMMARY       Recommendations    Goals:   1. Recommend modify pt's PO diet to Diabetic, Cardiac, Dysphagia soft and bite-sized IDDSI Level 6 diet   2. Recommend Banatrol TID to assist with diarrhea   3. Recommend weekly weights     Goals:  1. Pt will continue to tolerate and consume >75% PO intake of meals by RD follow up   2. Pt diarrhea will improve by RD follow up   Nutrition Goal Status: new  Communication of RD Recs: other (comment)    Assessment and Plan    Nutrition Problem  Increased nutrient needs    Related to (etiology):   Altered GI function     Signs and Symptoms (as evidenced by):   Diarrhea    Interventions(treatment strategy):  1. Carbohydrate, Sodium and Fat, Soft bite sized food Level six Blue textured modified diet  2. Commercial food  3. Collaboration of care with medical providers      Nutrition Diagnosis Status:   New         Malnutrition Assessment     Skin (Micronutrient): edema (Luca score = 17 (mild risk)       Fluid Accumulation (Malnutrition):  (1+ trace)                         Reason for Assessment    Reason For Assessment: consult (pt npo)  Diagnosis:  (Acute metabolic encephalopathy)  Relevant Medical History: DMT2, LUISA, Severe sepsis, Obesity, Back abscess, Elevated troponin, High anion gap metabolic acidosis, Elevated LFT's  Hx: Stroke/CVA, Ovarian cancer  General Information Comments: 5/5/23: 47 y.o. Female admitted for Acute metabolic encephalopathy. Pt noted with AMS. Spoke to RN, confirmed pt is not experiencing any N/V/D. H&P 5/4 noted pt was found down at home covered in feces and urine, brought to ED in unresponsive state, last well known x 2 days ago. Per SLP note 5/4 "SLP diagnosis of suspected Dysphagia and Cognitive-Linguistic Impairment in the setting of AMS with dx acute bilateral embolic infarcts (See MRI 5/4/23).  She presents with fluctuating mentation & severe communicative deficits, which limited " "assessment; She is recommended to remain NPO with ongoing bedside swallowing and communication assessment as cognition/EDMUND improves". Reviewed chart: LBM 5/3; Skin: Per wound care note 5/4 "...present on admission wound with purulent drainage to mid thoracic spine; Admitted with AMS; Sacrum intact;  Area of purple discoloration..., center of this are multiple small openings noted with purulent drainage expressed on palpation. No induration or fluctuance noted, appears consistent with carbuncle. Recommend general surgery consult to consider I&D"; Luca score: 8 (very high risk); Edema: 1+ trace bilateral foot/ankle/knee/leg. Labs, meds, weight reviewed. Labs 5/4 Albumin (L), Na (H), Cl (H), Glu (H), BUN (H), Cr (H), Alk phos (H), Ast (H), ALT (H), eGFR 29. Note Zosyn in D5W, heparin, Abx. Weight charted 5/4 278 lbs (BMI 47.72). RD will continue to monitor.  Nutrition Discharge Planning: Diabetic, Cardiac diet/ Dependent on medical treatment    Follow up:  5/10: Visited pt at bedside, pt on phone, just finished breakfast, visually confirmed 100% PO intake of meals. Pt reported having a good appetite, confirmed 100% PO intake of meals, pt requested that her diet is advanced, denies that she has chewing/ swallowing difficulties. Hospital Medicine Physician noted on 5/10 that the pt has improved mentation, LUISA resolved and that speech recommends IDDSI Level 6 soft and bite-sized. Pt reported experiencing some diarrhea, stated not experiencing any N/V, no abdominal pain/distention. Pt unsure of her normal weight, noted also had some difficulty with word finding. Pt appeared well nourished, no NFPE warranted at this time. Reviewed chart: LBM 5/9; Skin: incision back WDL; Luca score increased to 17 (mild); Edema: 1+ trace bilateral leg. Labs, meds, weight reviewed. Labs 5/10: Albumin (L), Glu (H), AST (H), ALT (H). Note clopidogrel, atorvastatin, amlodipine. Weight charted: 5/4 278 lbs, 5/9 273 lbs (BMI 47), -5 lb wt " "loss (1.8% wt change) x 5 days. RD will continue to monitor.      Nutrition Risk Screen    Nutrition Risk Screen: large or nonhealing wound, burn or pressure injury    Nutrition/Diet History    Spiritual, Cultural Beliefs, Zoroastrianism Practices, Values that Affect Care: no  Food Allergies: NKFA  Factors Affecting Nutritional Intake: impaired cognitive status/motor control, chewing difficulties/inability to chew food, difficulty/impaired swallowing, inability to feed self, NPO    Anthropometrics    Temp: 98.2 °F (36.8 °C)  Height: 5' 4" (162.6 cm)  Height (inches): 64 in  Weight Method: Bed Scale  Weight: 124.2 kg (273 lb 13 oz)  Weight (lb): 273.81 lb  Ideal Body Weight (IBW), Female: 120 lb  % Ideal Body Weight, Female (lb): 227.5 %  BMI (Calculated): 47  BMI Grade: greater than 40 - morbid obesity     Wt Readings from Last 15 Encounters:   05/09/23 124.2 kg (273 lb 13 oz)     Lab/Procedures/Meds    Pertinent Labs Reviewed: reviewed  Pertinent Labs Comments: Albumin (L), Na (H), Cl (H), Glu (H), BUN (H), Cr (H), Alk phos (H), Ast (H), ALT (H), eGFR 29  Pertinent Medications Reviewed: reviewed  Pertinent Medications Comments: Zosyn in D5W, heparin, Abx  BMP  Lab Results   Component Value Date     05/10/2023    K 4.4 05/10/2023     05/10/2023    CO2 23 05/10/2023    BUN 18 05/10/2023    CREATININE 1.0 05/10/2023    CALCIUM 9.2 05/10/2023    ANIONGAP 10 05/10/2023    EGFRNORACEVR >60 05/10/2023     Lab Results   Component Value Date    ALT 73 (H) 05/10/2023    AST 88 (H) 05/10/2023    ALKPHOS 105 05/10/2023    BILITOT 0.6 05/10/2023     .  Lab Results   Component Value Date    ALBUMIN 2.6 (L) 05/10/2023     Recent Labs   Lab 05/10/23  1147   POCTGLUCOSE 157*     Scheduled Meds:   amLODIPine  10 mg Oral Daily    aspirin  81 mg Oral Daily    atorvastatin  80 mg Oral QHS    clindamycin (CLEOCIN) IVPB  600 mg Intravenous Q8H    clopidogreL  75 mg Oral Daily    heparin (porcine)  5,000 Units Subcutaneous Q12H "     Continuous Infusions:  PRN Meds:.sodium chloride 0.9%, acetaminophen, dextrose 10%, dextrose 10%, dextrose, dextrose, glucagon (human recombinant), hydrALAZINE, HYDROmorphone, insulin aspart U-100, labetaloL, ondansetron, oxyCODONE-acetaminophen, sodium chloride 0.9%, sodium chloride 0.9%    Physical Findings/Assessment         Estimated/Assessed Needs    Weight Used For Calorie Calculations: 124.2 kg (273 lb 13 oz)  Energy Calorie Requirements (kcal): 1862 kcals (MSJ (Diabetic, Obese BMI 47)  Energy Need Method: Hye-St Jeor  Protein Requirements: 69-93 g (15-20% EEN (DM)  Weight Used For Protein Calculations: 124.2 kg (273 lb 13 oz)  Fluid Requirements (mL): 1862 mL (1 mL/kcal)  Estimated Fluid Requirement Method: RDA Method  RDA Method (mL): 1862  CHO Requirement: 233 (1862 kcals/8)      Nutrition Prescription Ordered    Current Diet Order: Dysphagia Mechanical soft (Level 5), Cardiac diet     Evaluation of Received Nutrient/Fluid Intake  I/O: (Net since admit)  5/5: +1376.2 mL  5/10: +5811.1 mL    Energy Calories Required: meeting needs  Protein Required: meeting needs  Fluid Required: not meeting needs  Tolerance: tolerating   % Intake of Estimated Energy Needs: 100%  % Meal Intake: 100%    Nutrition Risk    Level of Risk/Frequency of Follow-up: Low (F/u x 1 weekly)     Monitor and Evaluation    Food and Nutrient Intake: energy intake, food and beverage intake, enteral nutrition intake  Food and Nutrient Adminstration: diet order, enteral and parenteral nutrition administration  Knowledge/Beliefs/Attitudes: food and nutrition knowledge/skill, beliefs and attitudes  Anthropometric Measurements: weight, weight change, body mass index  Biochemical Data, Medical Tests and Procedures: electrolyte and renal panel, gastrointestinal profile, glucose/endocrine profile, inflammatory profile, lipid profile     Nutrition Follow-Up    RD Follow-up?: Yes  Sumi Loera, Registration Eligible, Provisional LDN

## 2023-05-10 NOTE — PLAN OF CARE
"SW went and meet with Patient at bedside, to discuss rehab facility. SW let patient know that [facility] is medically accepting and inquiring about submitting for insurance authorization. Patient wanted more information about rehabilitation center. FELIX received email from [facility] with website. SW made informational for Patient with basic information. Patient expressed that she was not sure about rehab and wished to go home as she had "stuff to get done". FELIX let Patient know she would talk to MD about discharging home with Home Health.  FELIX meet with MD about Patient wishes to go home vs rehab. MD stated he would have to review chart to see if home would be a safe DC plan.     SW will continue to follow and assist as needed.   "

## 2023-05-10 NOTE — PLAN OF CARE
Problem: Adult Inpatient Plan of Care  Goal: Absence of Hospital-Acquired Illness or Injury  Outcome: Ongoing, Progressing     Problem: Bariatric Environmental Safety  Goal: Safety Maintained with Care  Outcome: Ongoing, Progressing

## 2023-05-11 LAB
ALBUMIN SERPL BCP-MCNC: 2.6 G/DL (ref 3.5–5.2)
ALP SERPL-CCNC: 98 U/L (ref 55–135)
ALT SERPL W/O P-5'-P-CCNC: 61 U/L (ref 10–44)
ANION GAP SERPL CALC-SCNC: 10 MMOL/L (ref 8–16)
AST SERPL-CCNC: 54 U/L (ref 10–40)
BASOPHILS # BLD AUTO: 0.07 K/UL (ref 0–0.2)
BASOPHILS NFR BLD: 0.6 % (ref 0–1.9)
BILIRUB SERPL-MCNC: 0.5 MG/DL (ref 0.1–1)
BUN SERPL-MCNC: 17 MG/DL (ref 6–20)
CALCIUM SERPL-MCNC: 9.2 MG/DL (ref 8.7–10.5)
CHLORIDE SERPL-SCNC: 106 MMOL/L (ref 95–110)
CO2 SERPL-SCNC: 21 MMOL/L (ref 23–29)
CREAT SERPL-MCNC: 1.1 MG/DL (ref 0.5–1.4)
DIFFERENTIAL METHOD: ABNORMAL
EOSINOPHIL # BLD AUTO: 0.2 K/UL (ref 0–0.5)
EOSINOPHIL NFR BLD: 1.8 % (ref 0–8)
ERYTHROCYTE [DISTWIDTH] IN BLOOD BY AUTOMATED COUNT: 16.3 % (ref 11.5–14.5)
EST. GFR  (NO RACE VARIABLE): >60 ML/MIN/1.73 M^2
GLUCOSE SERPL-MCNC: 136 MG/DL (ref 70–110)
HCT VFR BLD AUTO: 28.2 % (ref 37–48.5)
HGB BLD-MCNC: 8.6 G/DL (ref 12–16)
IMM GRANULOCYTES # BLD AUTO: 0.33 K/UL (ref 0–0.04)
IMM GRANULOCYTES NFR BLD AUTO: 2.6 % (ref 0–0.5)
LYMPHOCYTES # BLD AUTO: 2.6 K/UL (ref 1–4.8)
LYMPHOCYTES NFR BLD: 20.4 % (ref 18–48)
MCH RBC QN AUTO: 21.6 PG (ref 27–31)
MCHC RBC AUTO-ENTMCNC: 30.5 G/DL (ref 32–36)
MCV RBC AUTO: 71 FL (ref 82–98)
MONOCYTES # BLD AUTO: 0.5 K/UL (ref 0.3–1)
MONOCYTES NFR BLD: 3.8 % (ref 4–15)
NEUTROPHILS # BLD AUTO: 8.9 K/UL (ref 1.8–7.7)
NEUTROPHILS NFR BLD: 70.8 % (ref 38–73)
NRBC BLD-RTO: 0 /100 WBC
PLATELET # BLD AUTO: 390 K/UL (ref 150–450)
PMV BLD AUTO: 10 FL (ref 9.2–12.9)
POCT GLUCOSE: 128 MG/DL (ref 70–110)
POCT GLUCOSE: 144 MG/DL (ref 70–110)
POCT GLUCOSE: 173 MG/DL (ref 70–110)
POCT GLUCOSE: 177 MG/DL (ref 70–110)
POTASSIUM SERPL-SCNC: 4.1 MMOL/L (ref 3.5–5.1)
PROT SERPL-MCNC: 7 G/DL (ref 6–8.4)
RBC # BLD AUTO: 3.99 M/UL (ref 4–5.4)
SODIUM SERPL-SCNC: 137 MMOL/L (ref 136–145)
WBC # BLD AUTO: 12.52 K/UL (ref 3.9–12.7)

## 2023-05-11 PROCEDURE — 97110 THERAPEUTIC EXERCISES: CPT

## 2023-05-11 PROCEDURE — 97110 THERAPEUTIC EXERCISES: CPT | Mod: CQ

## 2023-05-11 PROCEDURE — 85025 COMPLETE CBC W/AUTO DIFF WBC: CPT | Performed by: INTERNAL MEDICINE

## 2023-05-11 PROCEDURE — 25000003 PHARM REV CODE 250: Performed by: INTERNAL MEDICINE

## 2023-05-11 PROCEDURE — 36415 COLL VENOUS BLD VENIPUNCTURE: CPT | Performed by: INTERNAL MEDICINE

## 2023-05-11 PROCEDURE — 97530 THERAPEUTIC ACTIVITIES: CPT | Mod: CQ

## 2023-05-11 PROCEDURE — 80053 COMPREHEN METABOLIC PANEL: CPT | Performed by: INTERNAL MEDICINE

## 2023-05-11 PROCEDURE — 63600175 PHARM REV CODE 636 W HCPCS: Performed by: INTERNAL MEDICINE

## 2023-05-11 PROCEDURE — 25000003 PHARM REV CODE 250: Performed by: STUDENT IN AN ORGANIZED HEALTH CARE EDUCATION/TRAINING PROGRAM

## 2023-05-11 PROCEDURE — 97530 THERAPEUTIC ACTIVITIES: CPT

## 2023-05-11 PROCEDURE — 21400001 HC TELEMETRY ROOM

## 2023-05-11 RX ADMIN — HEPARIN SODIUM 5000 UNITS: 5000 INJECTION INTRAVENOUS; SUBCUTANEOUS at 09:05

## 2023-05-11 RX ADMIN — CLOPIDOGREL BISULFATE 75 MG: 75 TABLET ORAL at 08:05

## 2023-05-11 RX ADMIN — CLINDAMYCIN PHOSPHATE 600 MG: 600 INJECTION, SOLUTION INTRAVENOUS at 03:05

## 2023-05-11 RX ADMIN — CLINDAMYCIN PHOSPHATE 600 MG: 600 INJECTION, SOLUTION INTRAVENOUS at 08:05

## 2023-05-11 RX ADMIN — AMLODIPINE BESYLATE 10 MG: 10 TABLET ORAL at 08:05

## 2023-05-11 RX ADMIN — ASPIRIN 81 MG CHEWABLE TABLET 81 MG: 81 TABLET CHEWABLE at 08:05

## 2023-05-11 RX ADMIN — HEPARIN SODIUM 5000 UNITS: 5000 INJECTION INTRAVENOUS; SUBCUTANEOUS at 08:05

## 2023-05-11 RX ADMIN — ATORVASTATIN CALCIUM 80 MG: 40 TABLET, FILM COATED ORAL at 09:05

## 2023-05-11 NOTE — PLAN OF CARE
"SW went and meet with the Patient at bedside to discuss Rehab again. SW inquired about if Patient had rethougtht about going to rehab facility once discharged. Per Patient, she verbalized "She just wanted to go home.", and did not want to go to rehab. SW verbalized understanding and inquired about if Patient would be open to Home Health upon discharge. Patient was agreeable to having Home Health at home upon discharge.     SW will update MD on possible new discharge plan, per Patient request.   "

## 2023-05-11 NOTE — PT/OT/SLP PROGRESS
Occupational Therapy   Treatment    Name: Kenia Roper  MRN: [id number]  Admitting Diagnosis:  Acute metabolic encephalopathy  4 Days Post-Op    Recommendations:     Discharge Recommendations: rehabilitation facility  Discharge Equipment Recommendations:  to be determined by next level of care  Barriers to discharge:  None    Assessment:     Kenia Roper is a 47 y.o. female with a medical diagnosis of Acute metabolic encephalopathy.  She presents with the following performance deficits affecting function are weakness, impaired endurance, impaired sensation, impaired self care skills, gait instability, impaired functional mobility, impaired balance, decreased coordination, decreased upper extremity function, decreased safety awareness, pain.     Rehab Prognosis:  Good; patient would benefit from acute skilled OT services to address these deficits and reach maximum level of function.       Pt would benefit from intensive therapies in an inpatient setting with 3 hours of therapy/day. Pt's needs cannot be met at a lower level of care. Pt is motivated to progress. Rehabilitation facility recommended to return patient to PLOF, prevent future falls and decrease readmission risk.      Plan:     Patient to be seen 2 x/week to address the above listed problems via self-care/home management, therapeutic activities, therapeutic exercises  Plan of Care Expires: 05/18/23  Plan of Care Reviewed with: patient    Subjective     Chief Complaint: back pain  Patient/Family Comments/goals: get better  Pain/Comfort:  Pain Rating 1: 8/10  Location - Orientation 1: generalized  Location 1: back  Pain Addressed 1: Reposition, Distraction  Pain Rating Post-Intervention 1: 8/10    Objective:     Communicated with: Nurse and epic chart review prior to session.  Patient found supine with telemetry, bed alarm, Other (comments) (MIDLINE) upon OT entry to room.    General Precautions: Standard, fall    Orthopedic Precautions:N/A  Braces:  N/A  Respiratory Status: Room air     Occupational Performance:     Bed Mobility:    Patient completed Rolling/Turning to Left with  contact guard assistance  Patient completed Scooting/Bridging with contact guard assistance  Patient completed Supine to Sit with contact guard assistance     Functional Mobility/Transfers:  Patient completed Sit <> Stand Transfer with minimum assistance  with  rolling walker   Patient completed Bed <> Chair Transfer using Stand Pivot technique with minimum assistance with rolling walker    Activities of Daily Living:  Grooming: setup for item retrieval. Brushed teeth and washed face while up in chair.    [facility] 6 Click ADL: 16    Treatment & Education:  OT POC and goals updated to meet pt's progress. Pt reporting R hand digits 4&5 are numb and painful; difficulty with extension of these digits, digits remaining in partial flexion when performing therex. Pt also demonstrating R palmar grasp of toothpaste container.  Pt educated on and performed x10 reps BUE AROM therex in chair:  Shoulder flexion  Elbow flexion/ext  Digit flexion/ext  Pt requiring extended time to perform. Educated on techniques to use to increase independence and decrease fall risk with functional transfers. Educated on importance of OOB activity and calling for A to transfer back to bed. Encouraged completion of B UE AROM therex throughout the day to tolerance to increase functional strength and activity tolerance. Educated patient on importance of increased tolerance to upright position and direct impact on CV endurance and strength. Patient encouraged to sit up in chair for a minimum of 2 consecutive hours per day. Patient stated understanding and in agreement with POC.     Patient left up in chair with all lines intact, call button in reach, chair alarm on, and nurse notified    GOALS:   Multidisciplinary Problems       Occupational Therapy Goals          Problem: Occupational Therapy    Goal Priority Disciplines  Outcome Interventions   Occupational Therapy Goal     OT, PT/OT Ongoing, Progressing    Description: O.T. GOALS TO BE MET BY 5-18-23  PT WILL TOLERATE 1 SET X 15 REPS B UE AA/AROM EXERCISE  SBA WITH UE DRESSING  SPV WITH T/FS TO CHAIR.  SETUP ASSIST WITH TOILETING.                               Time Tracking:     OT Date of Treatment: 05/11/23  OT Start Time: 0950  OT Stop Time: 1020  OT Total Time (min): 30 min    Billable Minutes:Therapeutic Activity 15  Therapeutic Exercise 15    OT/[place]: DRE Salcedo OT     5/11/2023

## 2023-05-11 NOTE — PLAN OF CARE
OT POC updated with pt progress. Continue OT POC.  CGA for bed mobility. Min A for t/fs with RW.  Improvements in mobility.  Pt c/o R hand digits 4&5 are numb and painful, difficulty with digit extension.

## 2023-05-11 NOTE — PLAN OF CARE
Patient updated on plan of care. Fall and safety precautions in place. Vitals every 4 hours. Bed alarm on. Patient remained free from falls. Education provided; questions answered encouraged.  Pt has received IV antibiotics, awaiting to see where going to be discharged too.

## 2023-05-11 NOTE — PLAN OF CARE
SW attempted to contact Patient DCFS , Luli Tierney, to discuss rehab referral. Patient's DCFS  did not answer and SW was unable to leave message, due to voicemail box not being set up.

## 2023-05-11 NOTE — PT/OT/SLP PROGRESS
Physical Therapy  Treatment    Kenia Roper   MRN: [id number]   Admitting Diagnosis: Acute metabolic encephalopathy    PT Received On: 05/11/23  PT Start Time: 0950     PT Stop Time: 1020    PT Total Time (min): 30 min       Billable Minutes:  Therapeutic Activity 20 and Therapeutic Exercise 10    Treatment Type: Treatment  PT/PTA: PTA     Number of PTA visits since last PT visit: 1       General Precautions: Standard, fall  Orthopedic Precautions: N/A  Braces: N/A  Respiratory Status: Room air    Spiritual, Cultural Beliefs, Buddhism Practices, Values that Affect Care: no    Subjective:  Communicated with patient's nurse, Kristina, and completed Epic chart review prior to session.  Patient agreed to PT session.     Pain/Comfort  Pain Rating 1: 5/10  Location - Orientation 1: generalized  Location 1: back  Pain Addressed 1: Other (see comments) (ACTIVITY PACING)  Pain Rating Post-Intervention 1: 5/10    Objective:   Patient found with: telemetry, Other (comments), bed alarm (MIDLINE)    Supine > sit EOB: CGA    Forward scoot towards EOB: SBA    STS from EOB > RW: Min A (VC for hand placement)    Stand pivot T/F from EOB > chair w/ RW: Min A (VC for sequencing of task)    Completed x10 reps AROM TE to BLE: LAQ, Hip Flex, AP   Intermittent cues given as needed to maintain correct form during repetitions    Educated patient on importance of increased tolerance to upright position and direct impact on CV endurance and strength. Patient encouraged to sit up in chair/ EOB, for a minimum of 2 consecutive hours, 3x per day. Encouraged patient to perform AROM TE to BLE throughout the day within all available planes of motion. Re enforced importance of utilizing call light to meet needs in room and not attempt to get up without staff assistance. Patient verbalized understanding and agreed to comply.        AM-PAC 6 CLICK MOBILITY  How much help from another person does this patient currently need?   1 = Unable, Total/Dependent  Assistance  2 = A lot, Maximum/Moderate Assistance  3 = A little, Minimum/Contact Guard/Supervision  4 = None, Modified [place]/Independent    Turning over in bed (including adjusting bedclothes, sheets and blankets)?: 3  Sitting down on and standing up from a chair with arms (e.g., wheelchair, bedside commode, etc.): 3  Moving from lying on back to sitting on the side of the bed?: 3  Moving to and from a bed to a chair (including a wheelchair)?: 3  Need to walk in hospital room?: 1 (NT)  Climbing 3-5 steps with a railing?: 1 (NT)  Basic Mobility Total Score: 14    AM-PAC Raw Score CMS G-Code Modifier Level of Impairment Assistance   6 % Total / Unable   7 - 9 CM 80 - 100% Maximal Assist   10 - 14 CL 60 - 80% Moderate Assist   15 - 19 CK 40 - 60% Moderate Assist   20 - 22 CJ 20 - 40% Minimal Assist   23 CI 1-20% SBA / CGA   24 CH 0% Independent/ Mod I     Patient left up in chair with call button in reach and chair alarm on.    Assessment:  Kenia Roper is a 47 y.o. female with a medical diagnosis of Acute metabolic encephalopathy and presents with overall decline in functional mobility. Patient would continue to benefit from skilled PT to address functional limitations listed below in order to return to PLOF/decrease caregiver burden.      Rehab identified problem list/impairments: weakness, impaired endurance, gait instability, impaired functional mobility, impaired balance, decreased coordination, impaired cognition, decreased safety awareness, pain    Rehab potential is fair.    Activity tolerance: Fair    Discharge recommendations: rehabilitation facility      Barriers to discharge:      Equipment recommendations: to be determined by next level of care     GOALS:   Multidisciplinary Problems       Physical Therapy Goals          Problem: Physical Therapy    Goal Priority Disciplines Outcome Goal Variances Interventions   Physical Therapy Goal     PT, PT/OT Ongoing, Progressing     Description: Pt  will perform bed mobility SBA in order to participate in EOB activity.  Pt will perform transfers CGA in order to participate in OOB activity.   Pt will ambulate 50ft CGA with LRAD in order to participate in daily tasks.                         PLAN:    Patient to be seen 3 x/week to address the above listed problems via gait training, therapeutic activities, therapeutic exercises  Plan of Care expires: 05/18/23  Plan of Care reviewed with: patient         05/11/2023

## 2023-05-11 NOTE — PROGRESS NOTES
F/U visit with Ms. Roper for ongoign wound care. She is awake and alert.   POD 4 I&D of back abscess.  Dressing and packing removed. Penrose drain noted in place to connect abscess I&D site and counter incision site. Serosanguinous and purulent drainage noted on dressing when removed. Irrigated with vashe, then incisions filled with vashe wetted kerlix, topped with abd pad, and secured with medipore tape. Recommend daily wound care/packing by nursing staff.

## 2023-05-11 NOTE — PROGRESS NOTES
[facility] Medicine  Progress Note     Patient Name: Kenia Roper  MRN: [id number]  Patient Class: IP- Inpatient     Admission Date: 5/3/2023  Length of Stay: 8 days  Attending Physician: Vin Leonard MD  Primary Care Provider: Ross Perdomo MD           Subjective:      Principal Problem:Acute metabolic encephalopathy           HPI:  Ms. Roper is a 57-year-old morbidly obese  female, was brought in to the ED in an unresponsive state.  No family at the bedside, unable to obtain any information from patient.  No information per Care everywhere.  Most of the information obtained from ED staff.  Per report, patient was found down at home, covered in feces and urine.  Last known well two days ago.  No report of fever or chills.  Known history of CVA and type 2 diabetes mellitus.  In the ED she is afebrile, mildly tachycardic HR .  WBC elevated 20,000. BUN 60, creatinine 3.5 (unknown baseline).  Elevated LFTs in the 400s.  Troponin elevated 0.259, unknown if she has chest pain or SOB.  EKG NSR with no ST T wave changes.  Hemodynamically stable.  Presumed severe sepsis with LUISA/AMS, she received 30 mL/kilogram bolus in the ED along with IV Zosyn empirically.  Cultures obtained.  UDS positive for opiates.  CT head is negative for acute intracranial pathology.  CXR negative for infectious process.    Admitting diagnosis:  Severe sepsis.  Acute encephalopathy (TIA versus uremia).  LUISA.  Elevated troponin.  Elevated LFTs.        Overview/Hospital Course:  MRI brain revealed bilateral small scattered acute infarcts favoring embolic type infarcts.  Neurology consulted     Upon further examination, patient was found to have a large middle back abscess with purulent drainage.  CT of thoracic spine done without contrast showed subcutaneous on organized air-fluid collection dorsally consistent with clinical abscess or gas producing infection with no overt underlying bony  "change or involvement of the spinal canal.  Patient started on empiric antibiotics with Zosyn and clindamycin.  General surgery consulted and recommended MRI of thoracic spine and Neurosurgery consultation.  MRI of thoracic spine showed no overt bony marrow edema and no overt compromise of the thoracic canal, subcutaneous abscess or phlegmon with cutaneous fistula with no overt extension into the spinal canal.     Neurosurgery recommends no neurosurgical intervention as there is no evidence of cord compromise.  General surgery performed I&D of back abscess on 05/07/2023.  Blood cultures NGTD     Echocardiogram showed LVEF 55%, normal left ventricular diastolic function, no evidence of intracardiac shunting.  She will need cardiac monitoring to rule out AFib     Mentation improved, patient awake, alert, oriented although notable to have lower extremity weakness and difficulty with word finding.  Neurology consulted and recommended MRA brain which showed degraded image, scattered bilateral foci acute ischemia supratentorial similar to MRI brain, apparent relative diminished flow of left MCA which may be partially occlusive, otherwise no large vessel occlusion or critical stenosis noted.     PT/OT/SLP recommended rehab placement on discharge.  SLP also recommends patient be placed on soft and bite size diet (IDDSI level 6), with thin liquids     Case management working on placement for discharge     05/10/2023  Gen-surgery signed off, recommended OP f/u in their clinic in two weeks for drain removal. Rehab referral pending.         Interval History  Yesterday evening, inquired about going home. Implied that she's tired of waiting for rehab and that she has "stuff to do". ALYSA.  Will reassess her progress with PT/OT after their encounter today    Objective  BP (!) 148/78 (BP Location: Right arm)   Pulse 88   Temp 97.8 °F (36.6 °C) (Oral)   Resp 16   Ht 5' 4" (1.626 m)   Wt 124.2 kg (273 lb 13 oz)   LMP  (LMP " Unknown)   SpO2 98%   Breastfeeding No   BMI 47.00 kg/m²     Intake/Output Summary (Last 24 hours) at 5/11/2023 0710  Last data filed at 5/11/2023 0606  Gross per 24 hour   Intake 462.05 ml   Output --   Net 462.05 ml       PHYSICAL EXAM  Constitutional:       General: She is not in acute distress.     Appearance: She is morbidly obese.   Cardiovascular:      Rate and Rhythm: Normal rate and regular rhythm.   Pulmonary:      Effort: Pulmonary effort is normal. No accessory muscle usage or respiratory distress.      Breath sounds: No wheezing.   Abdominal:      General: Bowel sounds are normal. There is no distension.      Palpations: Abdomen is soft.      Tenderness: There is no abdominal tenderness. There is no guarding or rebound.   Musculoskeletal:      Cervical back: Neck supple.      Comments: Moving both lower extremities in bed   Skin:     General: Skin is warm and dry.   Neurological:      Mental Status: She is alert and oriented to person, place, and time.   Psychiatric:         Mood and Affect: Affect is flat.       BMP:   Recent Labs   Lab 05/11/23 0524   *      K 4.1      CO2 21*   BUN 17   CREATININE 1.1   CALCIUM 9.2     CBC:   Recent Labs   Lab 05/10/23  0504 05/11/23 0524   WBC 13.14* 12.52   HGB 8.5* 8.6*   HCT 27.5* 28.2*    390     CMP:   Recent Labs   Lab 05/10/23  0504 05/11/23 0524    137   K 4.4 4.1    106   CO2 23 21*   * 136*   BUN 18 17   CREATININE 1.0 1.1   CALCIUM 9.2 9.2   PROT 7.2 7.0   ALBUMIN 2.6* 2.6*   BILITOT 0.6 0.5   ALKPHOS 105 98   AST 88* 54*   ALT 73* 61*   ANIONGAP 10 10     Cardiac Markers: No results for input(s): CKMB, MYOGLOBIN, BNP, TROPISTAT in the last 48 hours.  Coagulation: No results for input(s): PT, INR, APTT in the last 48 hours.  Lactic Acid: No results for input(s): LACTATE in the last 48 hours.  Magnesium: No results for input(s): MG in the last 48 hours.  Troponin: No results for input(s): TROPONINI,  TROPONINIHS in the last 48 hours.  TSH:   Recent Labs   Lab 05/04/23  0639   TSH 2.690     Urine Studies:   No results for input(s): COLORU, APPEARANCEUA, PHUR, SPECGRAV, PROTEINUA, GLUCUA, KETONESU, BILIRUBINUA, OCCULTUA, NITRITE, UROBILINOGEN, LEUKOCYTESUR, RBCUA, WBCUA, BACTERIA, SQUAMEPITHEL, HYALINECASTS in the last 48 hours.    Invalid input(s): WRIGHTSUR    Imaging Results              MRI Thoracic Spine Without Contrast (Final result)  Result time 05/05/23 19:20:43   Procedure changed from MRI Thoracic Spine W WO Cont     Final result by Tammie Peña MD (05/05/23 19:20:43)                   Impression:      Imaging degraded related to large body habitus.  As visualized no overt bony marrow edema.    Discogenic spondylitic indentation upon the ventral thecal sac lower cervical spine incompletely evaluated.  No overt compromise of the thoracic canal.  Thoracic cord not well discriminated but without overt pathology.    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  No overt extension into the spinal canal.      Electronically signed by: Tammie Peña  Date:    05/05/2023  Time:    19:20               Narrative:    EXAMINATION:  MRI THORACIC SPINE WITHOUT CONTRAST    CLINICAL HISTORY:  back wound concern for osteo;    TECHNIQUE:  Multiplanar, multisequence images were performed through the thoracic spine.  Contrast was not administered.    COMPARISON:  CT correlation                                       US Retroperitoneal Complete (Final result)  Result time 05/04/23 15:45:42      Final result by Chente Guzman MD (05/04/23 15:45:42)                   Impression:      No significant abnormality.      Electronically signed by: Chente Guzman  Date:    05/04/2023  Time:    15:45               Narrative:    EXAMINATION:  US RETROPERITONEAL COMPLETE    CLINICAL HISTORY:  LUISA;    TECHNIQUE:  Ultrasound of the kidneys and urinary bladder was performed including color flow and  Doppler evaluation of the kidneys.    COMPARISON:  Abdominal ultrasound May 4, 2023.    FINDINGS:  Right kidney: The right kidney measures 13.7 cm. No cortical thinning.  Resistive index measures 0.69.  No mass. No renal stone. No hydronephrosis.    Left kidney: The left kidney measures 11.2 cm. No cortical thinning. Resistive index measures 0.7.  No mass. No renal stone. No hydronephrosis.    The bladder is partially distended at the time of scanning and has an unremarkable appearance.                                       MRI Brain Without Contrast (Final result)  Result time 05/04/23 14:18:14      Final result by Delbert Cai MD (05/04/23 14:18:14)                   Impression:      Bilateral small scattered acute infarcts favoring embolic type infarcts.    COMMUNICATION  This critical result was discovered/received at 210 p.m..  The critical information above was relayed directly by me by telephone to Shabana cash LPN on 05/04/2023 at 14:17.      Electronically signed by: Delbert Cai  Date:    05/04/2023  Time:    14:18               Narrative:    EXAMINATION:  MRI BRAIN WITHOUT CONTRAST    CLINICAL HISTORY:  Mental status change, unknown cause;.    TECHNIQUE:  Multiplanar multisequence MR imaging of the brain was performed without contrast.    COMPARISON:  Multiple priors.    FINDINGS:  Intracranial compartment:    Motion severely degrades the exam.    Ventricles and sulci are normal in size for age without evidence of hydrocephalus. No extra-axial blood or fluid collections.    Multiple small scattered acute infarcts some cortical and others within the white matter.  These favor embolic type infarcts.  Infarcts are noted in the bilateral frontal, and parietal lobes and basal ganglia.  Suspected bilateral occipital infarcts.  No mass lesion, acute hemorrhage, edema or acute infarct.    Normal vascular flow voids are preserved.    Skull/extracranial contents (limited evaluation): Bone marrow signal  intensity is normal.                                       US Abdomen Limited (Final result)  Result time 05/04/23 09:17:50      Final result by Luis Locke MD (05/04/23 09:17:50)                   Impression:      Technically difficult examination.    Enlarged liver with probable fatty infiltration.    Cholelithiasis without evidence of acute cholecystitis.    Additional findings as above.      Electronically signed by: Luis Locke  Date:    05/04/2023  Time:    09:17               Narrative:    EXAMINATION:  US ABDOMEN LIMITED    CLINICAL HISTORY:  elevated LFTs;.    TECHNIQUE:  Limited ultrasound of the right upper quadrant of the abdomen including pancreas, liver, gallbladder, common bile duct was performed.  Technically difficult examination secondary to patient body positioning and habitus.  Prominent overlying bowel gas.    COMPARISON:  None.    FINDINGS:  Liver: Normal in size, measuring 21.1 cm. Probable fatty liver infiltration. No focal hepatic lesions.  Portal vein patent and demonstrates proper directional flow.    Gallbladder: Gallbladder filled with gallstones creating wall echo shadow sign.  There is no gallbladder wall thickening or pericholecystic fluid.  No reported positive sonographic Collins's sign.    Biliary system: The common duct is not dilated, measuring 4 mm.  No intrahepatic ductal dilatation.    Spleen: Upper limits normal in size with a homogeneous echotexture, measuring 11.1 cm.    Pancreas: The visualized portions of pancreas appear normal.    Miscellaneous: No ascites.                                       US Carotid Bilateral (Final result)  Result time 05/04/23 08:51:37      Final result by Tonya No MD (05/04/23 08:51:37)                   Impression:      No evidence of a hemodynamically significant carotid bifurcation stenosis.      Electronically signed by: Tonya No  Date:    05/04/2023  Time:    08:51               Narrative:    EXAMINATION:  US  CAROTID BILATERAL    CLINICAL HISTORY:  AMS;    TECHNIQUE:  Grayscale and color Doppler ultrasound examination of the carotid and vertebral artery systems bilaterally.  Stenosis estimates are per the NASCET measurement criteria.    COMPARISON:  None.    FINDINGS:  Right:    Internal Carotid Artery (ICA) peak systolic velocity 106 cm/sec    ICA/CCA peak systolic velocity ratio: 1.3    Plaque formation: No significant    Vertebral artery: Antegrade flow and normal waveform.    Left:    Internal Carotid Artery (ICA)  peak systolic velocity 94 cm/sec    ICA/CCA peak systolic velocity ratio: 1.1    Plaque formation: Mild homogeneous    Vertebral artery: Antegrade flow and normal waveform.                                       CT Head Without Contrast (Final result)  Result time 05/03/23 22:50:33      Final result by Tammie Peña MD (05/03/23 22:50:33)                   Impression:      No acute finding as visualized with image degradation      Electronically signed by: Tammie Peña  Date:    05/03/2023  Time:    22:50               Narrative:    EXAMINATION:  CT HEAD WITHOUT CONTRAST    CLINICAL HISTORY:  Mental status change, unknown cause;    TECHNIQUE:  Low dose axial images were obtained through the head.  Coronal and sagittal reformations were also performed. Contrast was not administered.    COMPARISON:  None.    FINDINGS:  Iterative technique for diminishing radiation exposure automated dose    No acute intracranial hemorrhage or mass effect as visualized motion degradation.  No hydrocephalus.  Mild chronic changes.  Right scalp swelling or contusion.  No displaced skull fracture                                       X-Ray Chest AP Portable (Final result)  Result time 05/03/23 22:41:52      Final result by Tammie Peña MD (05/03/23 22:41:52)                   Impression:      No active finding      Electronically signed by: Tammie Peña  Date:    05/03/2023  Time:    22:41                Narrative:    EXAMINATION:  XR CHEST AP PORTABLE    CLINICAL HISTORY:  Sepsis;    TECHNIQUE:  Single frontal portable view of the chest was performed.    COMPARISON:  None    FINDINGS:  No pulmonary consolidation or sizable pleural effusion                                      Assessment/Plan  Cerebrovascular accident (CVA) due to embolism  Residual deficits      05/08/2023  -ambulated 3 ft with max A and RW  -PT/OT recommended rehab placement on discharge   -SLP recommends dysphagia soft diet with thin liquid     05/09/2023  Referral to rehab pending     05/11/2023  --SLP: Soft & Bite Sized Diet - IDDSI Level 6, Liquid Diet Level: Thin liquids - IDDSI Level 0   --PT: ambulated 3 ft min A with RW  --rehab still recommended for post acute therapy  --[facility] vs [facility], will follow up with CM/SW     * Acute metabolic encephalopathy  Patient found down at home, last well-known two days prior to ED presentation  History of CVA, lives at home with family and apparently able to participate in ADLs including babysitting her grandchild  CT head negative for acute intracranial pathology, however MRI brain revealed bilateral small acute infarcts consistent with embolic stroke  Also found to have back abscess on spine imaging  Metabolic encephalopathy noted on admission apparently multifactorial in etiology secondary to stroke and infectious process   Mentation improved     05/08/2023  Seemingly resolved at today's encounter  05/10/2023  Continues to be resolved, no signs of recurrence     Elevated CPK  Elevation likely secondary to muscle breakdown as patient was found down at home for unknown period of time  Trended down with IV fluid        Back abscess  Patient found to have drainage of purulence material from her back  CT of thoracic spine confirms abscess, MRI shows no spine involvement  General surgery consulted, recommended neurosurgery evaluation  No surgical intervention recommended  S/p I and D of 10 x 8 cm back abscess by  General surgery on 05/07/2023  Wound care  Continue clindamycin     05/08/2023  --echo negative for vegetation, shunting  --Blood cultures NGTD     05/09/2023  Surgery signed off, recommended patient follow up in their office in 2 weeks for drain removal        High anion gap metabolic acidosis  Resolved with IV fluids in setting of LUISA versus LUISA superimposed on CKD  Monitor        Elevated LFTs  , , .  Total bilirubin 1.3 on arrival  Acute hepatitis panel negative  Abdominal ultrasound shows no acute abnormality  LFTs downtrending   Continue to monitor     05/08/2023  --bumped on this AM labs, but likely d/t serum concentration following surgery  --continue to trend daily        Type 2 diabetes mellitus with hyperglycemia  Blood sugar mildly elevated 166 on admission  Hemoglobin A1c 7.1    Accu-Cheks with SSI  Diabetic diet  Optimize diabetic regimen in setting of acute embolic stroke  Unclear if patient is on any meds for diabetes outpatient     Elevated troponin  Troponin elevated 0.25 on admission, in the setting of elevated creatinine 3.5.  Repeat troponin remained flat  EKG NSR with no ST or T-wave changes.    Likely demand ischemia in setting of infectious process and acute embolic stroke  Although patient initially encephalopathic, now that mentation has improved denies any chest pain     LUISA (acute kidney injury)  05/08/2023  resolved     Severe sepsis  05/08/2023  --leukocytosis down-trending, afebrile  --evidence of infective focus: back abscess  --Blood Cultures (5/3/23)- NGTD  --continue IV clindamycin  --Organ dysfunction indicated by Acute kidney injury, Encephalopathy , and Acute liver injury- all of which has been improving    05/11/2023  resolved     Morbid obesity with BMI of 45.0-49.9, adult  Body mass index is 48.17 kg/m². Morbid obesity complicates all aspects of disease management from diagnostic modalities to treatment. Weight loss encouraged and health benefits  explained to patient.                         VTE Risk Mitigation (From admission, onward)           Ordered       heparin (porcine) injection 5,000 Units  Every 12 hours         05/04/23 0456       IP VTE HIGH RISK PATIENT  Once         05/04/23 0456       Place sequential compression device  Until discontinued         05/04/23 0456       Place sequential compression device  Until discontinued         05/04/23 0043                          Discharge Planning   CRISPIN:      Code Status: Full Code   Is the patient medically ready for discharge?:     Reason for patient still in hospital (select all that apply): Patient trending condition, Treatment and Pending disposition  Discharge Plan A: Rehab        Vin Leonard MD  Department of Hospital Medicine   [facility] ([facility])

## 2023-05-12 ENCOUNTER — TELEPHONE (OUTPATIENT)
Dept: CARDIOLOGY | Facility: HOSPITAL | Age: 48
End: 2023-05-12
Payer: MEDICAID

## 2023-05-12 LAB
ALBUMIN SERPL BCP-MCNC: 2.7 G/DL (ref 3.5–5.2)
ALP SERPL-CCNC: 96 U/L (ref 55–135)
ALT SERPL W/O P-5'-P-CCNC: 54 U/L (ref 10–44)
ANION GAP SERPL CALC-SCNC: 11 MMOL/L (ref 8–16)
AST SERPL-CCNC: 40 U/L (ref 10–40)
BASOPHILS # BLD AUTO: 0.04 K/UL (ref 0–0.2)
BASOPHILS NFR BLD: 0.3 % (ref 0–1.9)
BILIRUB SERPL-MCNC: 0.5 MG/DL (ref 0.1–1)
BUN SERPL-MCNC: 19 MG/DL (ref 6–20)
CALCIUM SERPL-MCNC: 9.2 MG/DL (ref 8.7–10.5)
CHLORIDE SERPL-SCNC: 107 MMOL/L (ref 95–110)
CO2 SERPL-SCNC: 20 MMOL/L (ref 23–29)
CREAT SERPL-MCNC: 1.1 MG/DL (ref 0.5–1.4)
DIFFERENTIAL METHOD: ABNORMAL
EOSINOPHIL # BLD AUTO: 0.2 K/UL (ref 0–0.5)
EOSINOPHIL NFR BLD: 1.8 % (ref 0–8)
ERYTHROCYTE [DISTWIDTH] IN BLOOD BY AUTOMATED COUNT: 16 % (ref 11.5–14.5)
EST. GFR  (NO RACE VARIABLE): >60 ML/MIN/1.73 M^2
GLUCOSE SERPL-MCNC: 133 MG/DL (ref 70–110)
HCT VFR BLD AUTO: 28.2 % (ref 37–48.5)
HGB BLD-MCNC: 8.4 G/DL (ref 12–16)
IMM GRANULOCYTES # BLD AUTO: 0.2 K/UL (ref 0–0.04)
IMM GRANULOCYTES NFR BLD AUTO: 1.6 % (ref 0–0.5)
LYMPHOCYTES # BLD AUTO: 2.6 K/UL (ref 1–4.8)
LYMPHOCYTES NFR BLD: 20.4 % (ref 18–48)
MCH RBC QN AUTO: 21.1 PG (ref 27–31)
MCHC RBC AUTO-ENTMCNC: 29.8 G/DL (ref 32–36)
MCV RBC AUTO: 71 FL (ref 82–98)
MONOCYTES # BLD AUTO: 0.5 K/UL (ref 0.3–1)
MONOCYTES NFR BLD: 3.9 % (ref 4–15)
NEUTROPHILS # BLD AUTO: 9.1 K/UL (ref 1.8–7.7)
NEUTROPHILS NFR BLD: 72 % (ref 38–73)
NRBC BLD-RTO: 0 /100 WBC
PLATELET # BLD AUTO: 383 K/UL (ref 150–450)
PMV BLD AUTO: 10 FL (ref 9.2–12.9)
POCT GLUCOSE: 114 MG/DL (ref 70–110)
POCT GLUCOSE: 121 MG/DL (ref 70–110)
POCT GLUCOSE: 136 MG/DL (ref 70–110)
POCT GLUCOSE: 150 MG/DL (ref 70–110)
POTASSIUM SERPL-SCNC: 4.2 MMOL/L (ref 3.5–5.1)
PROT SERPL-MCNC: 6.9 G/DL (ref 6–8.4)
RBC # BLD AUTO: 3.99 M/UL (ref 4–5.4)
SODIUM SERPL-SCNC: 138 MMOL/L (ref 136–145)
WBC # BLD AUTO: 12.57 K/UL (ref 3.9–12.7)

## 2023-05-12 PROCEDURE — 80053 COMPREHEN METABOLIC PANEL: CPT | Performed by: INTERNAL MEDICINE

## 2023-05-12 PROCEDURE — 25000003 PHARM REV CODE 250: Performed by: INTERNAL MEDICINE

## 2023-05-12 PROCEDURE — 97530 THERAPEUTIC ACTIVITIES: CPT

## 2023-05-12 PROCEDURE — 36415 COLL VENOUS BLD VENIPUNCTURE: CPT | Performed by: INTERNAL MEDICINE

## 2023-05-12 PROCEDURE — 85025 COMPLETE CBC W/AUTO DIFF WBC: CPT | Performed by: INTERNAL MEDICINE

## 2023-05-12 PROCEDURE — 97116 GAIT TRAINING THERAPY: CPT | Mod: CQ

## 2023-05-12 PROCEDURE — 97535 SELF CARE MNGMENT TRAINING: CPT

## 2023-05-12 PROCEDURE — 63600175 PHARM REV CODE 636 W HCPCS: Performed by: INTERNAL MEDICINE

## 2023-05-12 PROCEDURE — 21400001 HC TELEMETRY ROOM

## 2023-05-12 PROCEDURE — 25000003 PHARM REV CODE 250: Performed by: STUDENT IN AN ORGANIZED HEALTH CARE EDUCATION/TRAINING PROGRAM

## 2023-05-12 PROCEDURE — 97530 THERAPEUTIC ACTIVITIES: CPT | Mod: CQ

## 2023-05-12 RX ORDER — LOSARTAN POTASSIUM 25 MG/1
25 TABLET ORAL DAILY
Status: DISCONTINUED | OUTPATIENT
Start: 2023-05-12 | End: 2023-05-14

## 2023-05-12 RX ORDER — CLINDAMYCIN HYDROCHLORIDE 150 MG/1
300 CAPSULE ORAL EVERY 6 HOURS
Status: COMPLETED | OUTPATIENT
Start: 2023-05-12 | End: 2023-05-14

## 2023-05-12 RX ADMIN — CLINDAMYCIN HYDROCHLORIDE 300 MG: 150 CAPSULE ORAL at 11:05

## 2023-05-12 RX ADMIN — HEPARIN SODIUM 5000 UNITS: 5000 INJECTION INTRAVENOUS; SUBCUTANEOUS at 08:05

## 2023-05-12 RX ADMIN — CLINDAMYCIN PHOSPHATE 600 MG: 600 INJECTION, SOLUTION INTRAVENOUS at 12:05

## 2023-05-12 RX ADMIN — ASPIRIN 81 MG CHEWABLE TABLET 81 MG: 81 TABLET CHEWABLE at 08:05

## 2023-05-12 RX ADMIN — CLINDAMYCIN HYDROCHLORIDE 300 MG: 150 CAPSULE ORAL at 05:05

## 2023-05-12 RX ADMIN — ATORVASTATIN CALCIUM 80 MG: 40 TABLET, FILM COATED ORAL at 08:05

## 2023-05-12 RX ADMIN — CLOPIDOGREL BISULFATE 75 MG: 75 TABLET ORAL at 08:05

## 2023-05-12 RX ADMIN — LOSARTAN POTASSIUM 25 MG: 25 TABLET, FILM COATED ORAL at 08:05

## 2023-05-12 RX ADMIN — CLINDAMYCIN HYDROCHLORIDE 300 MG: 150 CAPSULE ORAL at 12:05

## 2023-05-12 RX ADMIN — AMLODIPINE BESYLATE 10 MG: 10 TABLET ORAL at 08:05

## 2023-05-12 NOTE — ASSESSMENT & PLAN NOTE
Patient found down at home, last well-known two days prior to ED presentation  History of CVA, lives at home with family and apparently able to participate in ADLs including babysitting her 6-year-old grandchild  CT head negative for acute intracranial pathology, however MRI brain revealed bilateral small acute infarcts consistent with embolic stroke  Also found to have back abscess on spine imaging  Metabolic encephalopathy noted on admission apparently multifactorial in etiology secondary to stroke and infectious process   Mentation improved, appears to be back to baseline

## 2023-05-12 NOTE — PT/OT/SLP PROGRESS
Physical Therapy  Treatment    Kenia Roper   MRN: [id number]   Admitting Diagnosis: Acute metabolic encephalopathy    PT Received On: 05/12/23  PT Start Time: 1055     PT Stop Time: 1125    PT Total Time (min): 30 min       Billable Minutes:  Gait Training 10 and Therapeutic Activity 20    Treatment Type: Treatment  PT/PTA: PTA     Number of PTA visits since last PT visit: 2       General Precautions: Standard, fall  Orthopedic Precautions: N/A  Braces: N/A  Respiratory Status: Room air    Spiritual, Cultural Beliefs, Nondenominational Practices, Values that Affect Care: no    Subjective:  Communicated with patient's nurse, Shae, and completed Epic chart review prior to session.  Patient agreed to PT session with some encouragement.     Pain/Comfort  Pain Rating 1: 0/10  Pain Rating Post-Intervention 1: 0/10    Objective:   Patient found with: telemetry, Other (comments) (MIDLINE)    Supine > sit EOB: CGA (VC for sequencing of task)    Forward scoot towards EOB: SBA    STS from EOB > RW: Min A (VC for hand placement)    8ft x2 trials w/ RW Mod A (VC for safety w/ RW mgmt)    Stand pivot T/F to toilet w/ RW: Mod A (VC for safety w/ sequencing of task)    STS from toilet > RW: Mod A (VC for hand placement)    Stand at sink x1 min w/ BUE self support (forearms) to wash hands after toileting. Required Min-Mod A to maintain standing balance due to intermittent knee buckling. Decreased endurance noted.     Stand pivot T/F to chair w/ RW: Mod A (VC for safety w/ RW mgmt)    PTA and MD spoke extensively with patient about importance of transition to inpatient rehab setting as well as limited timeframe for neuro plasticity with return of function. Also discussed patient's current level of function in relation to fall risk and significantly reduced ability to care for her grand son at this level.     Educated patient on importance of increased tolerance to upright position and direct impact on CV endurance and strength. Patient  encouraged to sit up in chair/ EOB, for a minimum of 2 consecutive hours, 3x per day. Encouraged patient to perform AROM TE to BLE throughout the day within all available planes of motion. Re enforced importance of utilizing call light to meet needs in room and not attempt to get up without staff assistance. Patient verbalized understanding and agreed to comply.      AM-PAC 6 CLICK MOBILITY  How much help from another person does this patient currently need?   1 = Unable, Total/Dependent Assistance  2 = A lot, Maximum/Moderate Assistance  3 = A little, Minimum/Contact Guard/Supervision  4 = None, Modified [place]/Independent    Turning over in bed (including adjusting bedclothes, sheets and blankets)?: 3  Sitting down on and standing up from a chair with arms (e.g., wheelchair, bedside commode, etc.): 3  Moving from lying on back to sitting on the side of the bed?: 3  Moving to and from a bed to a chair (including a wheelchair)?: 2  Need to walk in hospital room?: 2  Climbing 3-5 steps with a railing?: 1 (NT)  Basic Mobility Total Score: 14    AM-PAC Raw Score CMS G-Code Modifier Level of Impairment Assistance   6 % Total / Unable   7 - 9 CM 80 - 100% Maximal Assist   10 - 14 CL 60 - 80% Moderate Assist   15 - 19 CK 40 - 60% Moderate Assist   20 - 22 CJ 20 - 40% Minimal Assist   23 CI 1-20% SBA / CGA   24 CH 0% Independent/ Mod I     Patient left up in chair with call button in reach and chair alarm on.    Assessment:  Kenia Roper is a 47 y.o. female with a medical diagnosis of Acute metabolic encephalopathy and presents with overall decline in functional mobility. Patient would continue to benefit from skilled PT to address functional limitations listed below in order to return to PLOF/decrease caregiver burden.    Rehab identified problem list/impairments: weakness, impaired endurance, impaired sensation, impaired self care skills, impaired functional mobility, gait instability, impaired balance,  impaired cognition, decreased coordination, decreased upper extremity function, decreased lower extremity function, decreased safety awareness, pain, decreased ROM, impaired coordination, impaired cardiopulmonary response to activity    Rehab potential is fair.    Activity tolerance: Fair    Discharge recommendations: rehabilitation facility      Barriers to discharge:      Equipment recommendations: to be determined by next level of care     GOALS:   Multidisciplinary Problems       Physical Therapy Goals          Problem: Physical Therapy    Goal Priority Disciplines Outcome Goal Variances Interventions   Physical Therapy Goal     PT, PT/OT Ongoing, Progressing     Description: Pt will perform bed mobility SBA in order to participate in EOB activity.  Pt will perform transfers CGA in order to participate in OOB activity.   Pt will ambulate 50ft CGA with LRAD in order to participate in daily tasks.                         PLAN:    Patient to be seen 3 x/week to address the above listed problems via gait training, therapeutic activities, therapeutic exercises  Plan of Care expires: 05/18/23  Plan of Care reviewed with: patient         05/12/2023

## 2023-05-12 NOTE — PT/OT/SLP PROGRESS
Occupational Therapy   Treatment    Name: Kenia Roper  MRN: [id number]  Admitting Diagnosis:  Acute metabolic encephalopathy  5 Days Post-Op    Recommendations:     Discharge Recommendations: rehabilitation facility  Discharge Equipment Recommendations:  to be determined by next level of care  Barriers to discharge:  None    Assessment:     Kenia Roper is a 47 y.o. female with a medical diagnosis of Acute metabolic encephalopathy.  She presents with the following performance deficits affecting function are weakness, impaired endurance, impaired sensation, impaired self care skills, impaired functional mobility, gait instability, impaired balance, impaired cognition, decreased coordination, decreased upper extremity function, decreased lower extremity function, decreased safety awareness, decreased ROM, impaired coordination, impaired fine motor, impaired cardiopulmonary response to activity.     Rehab Prognosis:  Good; patient would benefit from acute skilled OT services to address these deficits and reach maximum level of function.       Pt would benefit from intensive therapies in an inpatient setting with 3 hours of therapy/day. Pt's needs cannot be met at a lower level of care. Pt is motivated to progress. Rehabilitation facility recommended to return patient to OF, prevent future falls and decrease readmission risk.    Plan:     Patient to be seen 2 x/week to address the above listed problems via self-care/home management, therapeutic activities, therapeutic exercises  Plan of Care Expires: 05/18/23  Plan of Care Reviewed with: patient    Subjective     Chief Complaint: weakness  Patient/Family Comments/goals: ambulate, pt wants to return home  Pain/Comfort:  Pain Rating 1: 0/10    Objective:     Communicated with: Nurse and epic chart review prior to session.  Patient found right sidelying with telemetry, Other (comments) (MIDLINE) upon OT entry to room.    General Precautions: Standard, fall     Orthopedic Precautions:N/A  Braces: N/A  Respiratory Status: Room air     Occupational Performance:     Bed Mobility:    Patient completed Rolling/Turning to Left with  contact guard assistance  Patient completed Scooting/Bridging with stand by assistance  Patient completed Supine to Sit with contact guard assistance     Functional Mobility/Transfers:  Sit<>Stand from EOB with Min A and RW.  Patient completed Bed <> Chair Transfer using Stand Pivot technique with moderate assistance with rolling walker  Patient completed Toilet Transfer Stand Pivot technique with moderate assistance with  rolling walker  Sit<>Stand from toilet with Mod A and RW.  Functional Mobility: Patient completed x8ft x2 reps functional mobility with Mod A and RW to increase dynamic standing balance and activity tolerance needed for ADL completion.     Activities of Daily Living:  Grooming: setup assist. Pt washing hands at sink, but requiring to sit in chair to complete other ADLs d/t decreased endurance. Performed oral care and washed face while in chair.   Toileting: contact guard assistance using toilet in bathroom with increased time, pt able to manage clothing and clean backside.    [facility] 6 Click ADL: 17    Treatment & Education:  Pt requiring verbal cueing for RW management and safety throughout. Pt able to stand at sink ~1 minute, requiring Min A-Mod A to maintain standing balance d/t knee buckling. Pt demonstrating decreased endurance. Educated patient about importance of transition to inpatient rehab setting for strengthening and safety with functional tasks, as well as limited timeframe for neuro plasticity with return of function. Also discussed patient's current level of function in relation to fall risk and significantly reduced ability to care for her grand son at this level.   Reviewed role of OT in acute setting and benefits of participation. Educated on techniques to use to increase independence and decrease fall risk with  functional transfers. Educated on importance of OOB activity and calling for A to transfer back to bed. Encouraged completion of B UE AROM therex throughout the day to tolerance to increase functional strength and activity tolerance. Educated patient on importance of increased tolerance to upright position and direct impact on CV endurance and strength. Patient encouraged to sit up in chair for a minimum of 2 consecutive hours per day.  Patient stated understanding and in agreement with POC.     Patient left up in chair with all lines intact, call button in reach, and chair alarm on    GOALS:   Multidisciplinary Problems       Occupational Therapy Goals          Problem: Occupational Therapy    Goal Priority Disciplines Outcome Interventions   Occupational Therapy Goal     OT, PT/OT Ongoing, Progressing    Description: O.T. GOALS TO BE MET BY 5-18-23  PT WILL TOLERATE 1 SET X 15 REPS B UE AA/AROM EXERCISE  SBA WITH UE DRESSING  SPV WITH T/FS TO CHAIR.  SETUP ASSIST WITH TOILETING.                               Time Tracking:     OT Date of Treatment: 05/12/23  OT Start Time: 1055  OT Stop Time: 1120  OT Total Time (min): 25 min    Billable Minutes:Self Care/Home Management 10  Therapeutic Activity 15    OT/[place]: DRE Salcedo OT     5/12/2023

## 2023-05-12 NOTE — ASSESSMENT & PLAN NOTE
Patient found to have drainage of purulence material from her back  CT of thoracic spine confirms abscess, MRI shows no spine involvement  General surgery consulted, recommended neurosurgery evaluation  No surgical intervention recommended  S/p I and D of 10 x 8 cm back abscess with Penrose drain placement by General surgery on 05/07/2023  Local wound care with daily wet-to-dry dressing changes  Continue clindamycin  Follow up with General surgery 2 weeks postop for Penrose drain removal

## 2023-05-12 NOTE — ASSESSMENT & PLAN NOTE
This patient does have evidence of infective focus  My overall impression is sepsis.  Source: Skin and Soft Tissue (location Back)  Antibiotics given-   Antibiotics (72h ago, onward)    Start     Stop Route Frequency Ordered    05/12/23 0600  clindamycin capsule 300 mg         -- Oral Every 6 hours 05/12/23 0221        Latest lactate reviewed-  No results for input(s): LACTATE in the last 72 hours.  Organ dysfunction indicated by Acute kidney injury and Acute liver injury    Fluid challenge Ideal Body Weight- The patient's ideal body weight is Ideal body weight: 54.7 kg (120 lb 9.5 oz) which will be used to calculate fluid bolus of 30 ml/kg for treatment of septic shock.      Post- resuscitation assessment No - Post resuscitation assessment not needed       Will Not start Pressors- Levophed for MAP of 65  Source control achieved by:  IV antibiotics.      -afebrile.  HR .  WBC 19663, 0% bands, elevated procalcitonin on admission  -Leukocytosis has resolved  -possible multiple etiologies of infectious process secondary to UTI and back abscess  -blood cultures NGTD, urine culture with no growth  -Zosyn discontinued given urine culture showed no growth  -s/p I&D of back abscess on 05/07/2023  -transitioned to oral clindamycin

## 2023-05-12 NOTE — SUBJECTIVE & OBJECTIVE
Interval History: No acute events overnight.  Seen and examined without any family present.  Reports she feels well, however stated that she has things to do at home and would prefer to be discharged home rather than going to rehab.  Denies any other complaints including chest shortness of breath, nausea, abdominal pain.  Discussed with patient the importance of PCP follow-up and compliance with meds to prevent more strokes after patient asked why she keeps getting strokes.  Discussed with her that she is unsafe for discharge home at this time given she still requires a lot of assistance and it would be unsafe for her to try to be at home and take care of her grandson right now.  Especially as she does not have any family members who could help her other and her daughter and 6-year-old grandson.  After further discussions patient subsequently agreed for rehab placement.  [facility] facility notified by FELIX and submitted for insurance authorization.    Review of Systems  Objective:     Vital Signs (Most Recent):  Temp: 98.5 °F (36.9 °C) (05/12/23 1201)  Pulse: 86 (05/12/23 1201)  Resp: 20 (05/12/23 1201)  BP: 135/79 (05/12/23 1201)  SpO2: 100 % (05/12/23 1201) Vital Signs (24h Range):  Temp:  [97.3 °F (36.3 °C)-99 °F (37.2 °C)] 98.5 °F (36.9 °C)  Pulse:  [78-94] 86  Resp:  [14-20] 20  SpO2:  [98 %-100 %] 100 %  BP: (133-164)/(62-80) 135/79     Weight: 124.2 kg (273 lb 13 oz)  Body mass index is 47 kg/m².    Intake/Output Summary (Last 24 hours) at 5/12/2023 1253  Last data filed at 5/12/2023 0556  Gross per 24 hour   Intake 440.06 ml   Output --   Net 440.06 ml         Physical Exam  Vitals and nursing note reviewed.   Constitutional:       Appearance: She is morbidly obese.   HENT:      Head: Normocephalic and atraumatic.      Right Ear: External ear normal.      Left Ear: External ear normal.   Eyes:      Pupils: Pupils are equal, round, and reactive to light.   Cardiovascular:      Rate and Rhythm: Regular  rhythm.   Pulmonary:      Effort: Pulmonary effort is normal. No accessory muscle usage or respiratory distress.      Breath sounds: No wheezing.   Abdominal:      General: There is no distension.      Palpations: Abdomen is soft.      Tenderness: There is no abdominal tenderness. There is no guarding or rebound.   Musculoskeletal:      Cervical back: Neck supple.      Right lower leg: No edema.      Left lower leg: No edema.   Skin:     General: Skin is warm and dry.   Neurological:      Mental Status: She is alert and oriented to person, place, and time.      Motor: Weakness present.   Psychiatric:         Behavior: Behavior is cooperative.           Significant Labs: All pertinent labs within the past 24 hours have been reviewed.  CBC:   Recent Labs   Lab 05/11/23 0524 05/12/23  0458   WBC 12.52 12.57   HGB 8.6* 8.4*   HCT 28.2* 28.2*    383     CMP:   Recent Labs   Lab 05/11/23 0524 05/12/23  0458    138   K 4.1 4.2    107   CO2 21* 20*   * 133*   BUN 17 19   CREATININE 1.1 1.1   CALCIUM 9.2 9.2   PROT 7.0 6.9   ALBUMIN 2.6* 2.7*   BILITOT 0.5 0.5   ALKPHOS 98 96   AST 54* 40   ALT 61* 54*   ANIONGAP 10 11       Significant Imaging: I have reviewed all pertinent imaging results/findings within the past 24 hours.

## 2023-05-12 NOTE — PLAN OF CARE
Problem: Adult Inpatient Plan of Care  Goal: Optimal Comfort and Wellbeing  Outcome: Ongoing, Progressing     Problem: Adjustment to Illness (Stroke, Ischemic/Transient Ischemic Attack)  Goal: Optimal Coping  Outcome: Ongoing, Progressing

## 2023-05-12 NOTE — PLAN OF CARE
05/12/23 1123   Post-Acute Status   Post-Acute Authorization Placement   Post-Acute Placement Status Pending payor review/awaiting authorization (if required)   Coverage [facility] - Medicaid   Discharge Delays None known at this time   Discharge Plan   Discharge Plan A Rehab       SW called Julienne with [facility], to submit for insurance authorization.

## 2023-05-12 NOTE — ASSESSMENT & PLAN NOTE
Blood sugar mildly elevated 166 on admission  Hemoglobin A1c 7.1    Accu-Cheks with SSI  Diabetic diet  Optimize diabetic regimen in setting of acute embolic stroke  Patient admits that she has not been on any meds outpatient for a couple of years, although she previously was on metformin  Restart metformin on discharge

## 2023-05-12 NOTE — PLAN OF CARE
Continue OT POC.  CGA for bed mobility. Min A for sit>stand from EOB using RW. Mod A for sit>stand from toilet using RW and ambulation 8ft x2 reps with RW.  Discussed importance of rehab to meet pt's functional goals.

## 2023-05-12 NOTE — PROGRESS NOTES
[facility]  Progress Note    Patient Name: Kenia Roper  MRN: [id number]  Patient Class: IP- Inpatient   Admission Date: 5/3/2023  Length of Stay: 8 days  Attending Physician: Heydi Cardozo DO  Primary Care Provider: Ross Perdomo MD        Subjective:     Principal Problem:Acute metabolic encephalopathy        HPI:  Ms. Roper is a 57-year-old morbidly obese  female, was brought in to the ED in an unresponsive state.  No family at the bedside, unable to obtain any information from patient.  No information per Care everywhere.  Most of the information obtained from ED staff.  Per report, patient was found down at home, covered in feces and urine.  Last known well two days ago.  No report of fever or chills.  Known history of CVA and type 2 diabetes mellitus.  In the ED she is afebrile, mildly tachycardic HR .  WBC elevated 20,000. BUN 60, creatinine 3.5 (unknown baseline).  Elevated LFTs in the 400s.  Troponin elevated 0.259, unknown if she has chest pain or SOB.  EKG NSR with no ST T wave changes.  Hemodynamically stable.  Presumed severe sepsis with LUISA/AMS, she received 30 mL/kilogram bolus in the ED along with IV Zosyn empirically.  Cultures obtained.  UDS positive for opiates.  CT head is negative for acute intracranial pathology.  CXR negative for infectious process.    Admitting diagnosis:  Severe sepsis.  Acute encephalopathy (TIA versus uremia).  LUISA.  Elevated troponin.  Elevated LFTs.      Overview/Hospital Course:  MRI brain revealed bilateral small scattered acute infarcts favoring embolic type infarcts.  Neurology consulted    Upon further examination, patient was found to have a large middle back abscess with purulent drainage.  CT of thoracic spine done without contrast showed subcutaneous on organized air-fluid collection dorsally consistent with clinical abscess or gas producing infection with no overt underlying bony change or  involvement of the spinal canal.  Patient started on empiric antibiotics with Zosyn and clindamycin.  General surgery consulted and recommended MRI of thoracic spine and Neurosurgery consultation.  MRI of thoracic spine showed no overt bony marrow edema and no overt compromise of the thoracic canal, subcutaneous abscess or phlegmon with cutaneous fistula with no overt extension into the spinal canal.    Neurosurgery recommends no neurosurgical intervention as there is no evidence of cord compromise.  General surgery performed I&D of back abscess on 05/07/2023.  Blood cultures with no growth.  Per General surgery, patient will require daily wet-to-dry dressing changes with Kerlix gauze, keep Penrose in place.  Patient will need to follow up surgery Clinic 2 weeks postop at which time Penrose will be removed    Echocardiogram showed LVEF 55%, normal left ventricular diastolic function, no evidence of intracardiac shunting.  She will need cardiac monitoring to rule out AFib    Mentation improved, patient awake, alert, oriented although notable to have lower extremity weakness and difficulty with word finding.  Neurology consulted and recommended MRA brain which showed degraded image, scattered bilateral foci acute ischemia supratentorial similar to MRI brain, apparent relative diminished flow of left MCA which may be partially occlusive, otherwise no large vessel occlusion or critical stenosis noted.    THERAPISTS' RECS  --SLP: Soft & Bite Sized Diet - IDDSI Level 6, Liquid Diet Level: Thin liquids - IDDSI Level 0   --PT: ambulated 3 ft min A with RW  --rehab still recommended for post acute therapy  [place] [facility] [place] [facility]    Case management working on placement for discharge.  Accepted at [facility], awaiting insurance authorization      Interval History: No acute events overnight.  Seen and examined without any family present.  Reports she feels well, however stated that she has things to do at  home and would prefer to be discharged home rather than going to rehab.  Denies any other complaints including chest shortness of breath, nausea, abdominal pain.  Discussed with patient the importance of PCP follow-up and compliance with meds to prevent more strokes after patient asked why she keeps getting strokes.  Discussed with her that she is unsafe for discharge home at this time given she still requires a lot of assistance and it would be unsafe for her to try to be at home and take care of her grandson right now.  Especially as she does not have any family members who could help her other and her daughter and 6-year-old grandson.  After further discussions patient subsequently agreed for rehab placement.  [facility] facility notified by SW and submitted for insurance authorization.    Review of Systems  Objective:     Vital Signs (Most Recent):  Temp: 98.5 °F (36.9 °C) (05/12/23 1201)  Pulse: 86 (05/12/23 1201)  Resp: 20 (05/12/23 1201)  BP: 135/79 (05/12/23 1201)  SpO2: 100 % (05/12/23 1201) Vital Signs (24h Range):  Temp:  [97.3 °F (36.3 °C)-99 °F (37.2 °C)] 98.5 °F (36.9 °C)  Pulse:  [78-94] 86  Resp:  [14-20] 20  SpO2:  [98 %-100 %] 100 %  BP: (133-164)/(62-80) 135/79     Weight: 124.2 kg (273 lb 13 oz)  Body mass index is 47 kg/m².    Intake/Output Summary (Last 24 hours) at 5/12/2023 1253  Last data filed at 5/12/2023 0556  Gross per 24 hour   Intake 440.06 ml   Output --   Net 440.06 ml         Physical Exam  Vitals and nursing note reviewed.   Constitutional:       Appearance: She is morbidly obese.   HENT:      Head: Normocephalic and atraumatic.      Right Ear: External ear normal.      Left Ear: External ear normal.   Eyes:      Pupils: Pupils are equal, round, and reactive to light.   Cardiovascular:      Rate and Rhythm: Regular rhythm.   Pulmonary:      Effort: Pulmonary effort is normal. No accessory muscle usage or respiratory distress.      Breath sounds: No wheezing.   Abdominal:       General: There is no distension.      Palpations: Abdomen is soft.      Tenderness: There is no abdominal tenderness. There is no guarding or rebound.   Musculoskeletal:      Cervical back: Neck supple.      Right lower leg: No edema.      Left lower leg: No edema.   Skin:     General: Skin is warm and dry.   Neurological:      Mental Status: She is alert and oriented to person, place, and time.      Motor: Weakness present.   Psychiatric:         Behavior: Behavior is cooperative.           Significant Labs: All pertinent labs within the past 24 hours have been reviewed.  CBC:   Recent Labs   Lab 05/11/23 0524 05/12/23 0458   WBC 12.52 12.57   HGB 8.6* 8.4*   HCT 28.2* 28.2*    383     CMP:   Recent Labs   Lab 05/11/23 0524 05/12/23 0458    138   K 4.1 4.2    107   CO2 21* 20*   * 133*   BUN 17 19   CREATININE 1.1 1.1   CALCIUM 9.2 9.2   PROT 7.0 6.9   ALBUMIN 2.6* 2.7*   BILITOT 0.5 0.5   ALKPHOS 98 96   AST 54* 40   ALT 61* 54*   ANIONGAP 10 11       Significant Imaging: I have reviewed all pertinent imaging results/findings within the past 24 hours.      Assessment/Plan:      * Acute metabolic encephalopathy  Patient found down at home, last well-known two days prior to ED presentation  History of CVA, lives at home with family and apparently able to participate in ADLs including babysitting her 6-year-old grandchild  CT head negative for acute intracranial pathology, however MRI brain revealed bilateral small acute infarcts consistent with embolic stroke  Also found to have back abscess on spine imaging  Metabolic encephalopathy noted on admission apparently multifactorial in etiology secondary to stroke and infectious process   Mentation improved, appears to be back to baseline    Elevated CPK  Elevation likely secondary to muscle breakdown as patient was found down at home for unknown period of time  Trended down with IV fluid    Cerebrovascular accident (CVA) due to  embolism  Patient noted to have multiple areas of infarction on both hemispheres concerning for embolic phenomena  Carotid ultrasound showed no significant stenosis     Antithrombotics for secondary stroke prevention: Antiplatelets: Aspirin: 81 mg daily  Clopidogrel: 75 mg daily    Statins for secondary stroke prevention and hyperlipidemia, if present:   Statins: Atorvastatin- 80 mg daily    Aggressive risk factor modification: HTN, DM, Obesity     Rehab efforts: The patient has been evaluated by a stroke team provider and the therapy needs have been fully considered based off the presenting complaints and exam findings. The following therapy evaluations are needed: PT evaluate and treat, OT evaluate and treat, SLP evaluate and treat    Diagnostics ordered/pending: MRA head to assess vasculature, TTE to assess cardiac function/status , TSH to assess thyroid function    VTE prophylaxis: Enoxaparin 40 mg SQ every 24 hours    BP parameters: Infarct: No intervention, SBP <220     Patient is past permissive hypertension period.  Optimize BP  Patient will need cardiac monitoring given embolic nature of stroke with no evidence of intracardiac shunting    PT/OT recommended rehab placement on discharge   SLP recommends dysphagia soft diet with thin liquid    Back abscess  Patient found to have drainage of purulence material from her back  CT of thoracic spine confirms abscess, MRI shows no spine involvement  General surgery consulted, recommended neurosurgery evaluation  No surgical intervention recommended  S/p I and D of 10 x 8 cm back abscess with Penrose drain placement by General surgery on 05/07/2023  Local wound care with daily wet-to-dry dressing changes  Continue clindamycin  Follow up with General surgery 2 weeks postop for Penrose drain removal      High anion gap metabolic acidosis  Resolved with IV fluids in setting of LUISA versus LUISA superimposed on CKD  Monitor      Elevated LFTs  , , .   Total bilirubin 1.3 on arrival  Acute hepatitis panel negative  Abdominal ultrasound shows no acute abnormality  LFTs downtrending, almost normal   Possibly fatty liver disease      Type 2 diabetes mellitus with hyperglycemia  Blood sugar mildly elevated 166 on admission  Hemoglobin A1c 7.1    Accu-Cheks with SSI  Diabetic diet  Optimize diabetic regimen in setting of acute embolic stroke  Patient admits that she has not been on any meds outpatient for a couple of years, although she previously was on metformin  Restart metformin on discharge    Elevated troponin  Troponin elevated 0.25 on admission, in the setting of elevated creatinine 3.5.  Repeat troponin remained flat  EKG NSR with no ST or T-wave changes.    Likely demand ischemia in setting of infectious process and acute embolic stroke  Although patient initially encephalopathic, now that mentation has improved denies any chest pain    LUISA (acute kidney injury)  Patient with acute kidney injury likely due to IVVD/dehydration and acute tubular necrosis LUISA is currently improving. Labs reviewed- Renal function/electrolytes with Estimated Creatinine Clearance: 82.3 mL/min (based on SCr of 1.1 mg/dL). according to latest data. Monitor urine output and serial BMP and adjust therapy as needed. Avoid nephrotoxins and renally dose meds for GFR listed above.     creatinine elevated 3.5 on admission, downtrended with IV fluid administration   Nephrology consulted and have signed off   IV fluid discontinued given history of CHF noted on care everywhere and patient is able to tolerate oral intake, creatinine has been stable around 1  Baseline renal function unclear    Severe sepsis  This patient does have evidence of infective focus  My overall impression is sepsis.  Source: Skin and Soft Tissue (location Back)  Antibiotics given-   Antibiotics (72h ago, onward)    Start     Stop Route Frequency Ordered    05/12/23 0600  clindamycin capsule 300 mg         -- Oral Every  6 hours 05/12/23 0221        Latest lactate reviewed-  No results for input(s): LACTATE in the last 72 hours.  Organ dysfunction indicated by Acute kidney injury and Acute liver injury    Fluid challenge Ideal Body Weight- The patient's ideal body weight is Ideal body weight: 54.7 kg (120 lb 9.5 oz) which will be used to calculate fluid bolus of 30 ml/kg for treatment of septic shock.      Post- resuscitation assessment No - Post resuscitation assessment not needed       Will Not start Pressors- Levophed for MAP of 65  Source control achieved by:  IV antibiotics.      -afebrile.  HR .  WBC 10929, 0% bands, elevated procalcitonin on admission  -Leukocytosis has resolved  -possible multiple etiologies of infectious process secondary to UTI and back abscess  -blood cultures NGTD, urine culture with no growth  -Zosyn discontinued given urine culture showed no growth  -s/p I&D of back abscess on 05/07/2023  -transitioned to oral clindamycin    Morbid obesity with BMI of 45.0-49.9, adult  Body mass index is 47 kg/m². Morbid obesity complicates all aspects of disease management from diagnostic modalities to treatment. Weight loss encouraged and health benefits explained to patient.           VTE Risk Mitigation (From admission, onward)         Ordered     heparin (porcine) injection 5,000 Units  Every 12 hours         05/04/23 0456     IP VTE HIGH RISK PATIENT  Once         05/04/23 0456     Place sequential compression device  Until discontinued         05/04/23 0456     Place sequential compression device  Until discontinued         05/04/23 0043                Discharge Planning   CRISPIN:      Code Status: Full Code   Is the patient medically ready for discharge?:     Reason for patient still in hospital (select all that apply): Pending disposition  Discharge Plan A: Rehab   Discharge Delays: None known at this time    Awaiting insurance authorization          Heydi Cardozo DO  Department of Hospital Medicine   [facility]  - Telemetry ([facility])

## 2023-05-12 NOTE — PLAN OF CARE
SW spoke to Attending who stated that PT/OT and herself were working to get the Patient agreeable to Rehab. Attending came and talked to SW and let her know that Patient was agreeable to rehab facility, in order to get better for her grandson. SW verbalized and let [facility] know they could submit for insurance authorization.  SW went and meet with Patient at bedside to ensure that she was agreeable to rehab facility once discharged. Patient was agreeable to plan of rehab once discharged, to be stronger for her grandson. Patient inquired about how long she would be at [facility], SW stated about 1-2 weeks then she could return home.  Patient verbalized understanding.     SW will continue to follow and assist as needed.

## 2023-05-12 NOTE — PROGRESS NOTES
Pharmacist Intervention IV to PO Note    Kenia Roper is a 47 y.o. female being treated with IV medication clindamycin    Patient Data:    Vital Signs (Most Recent):  Temp: 98.6 °F (37 °C) (05/12/23 0029)  Pulse: 80 (05/12/23 0100)  Resp: 20 (05/12/23 0029)  BP: (!) 164/80 (05/12/23 0029)  SpO2: 98 % (05/12/23 0029) Vital Signs (72h Range):  Temp:  [97.3 °F (36.3 °C)-99.7 °F (37.6 °C)]   Pulse:  [68-95]   Resp:  [14-20]   BP: (126-209)/(59-96)   SpO2:  [97 %-100 %]      CBC:  Recent Labs   Lab 05/09/23  0450 05/10/23  0504 05/11/23  0524   WBC 11.07 13.14* 12.52   RBC 3.68* 3.93* 3.99*   HGB 7.8* 8.5* 8.6*   HCT 26.0* 27.5* 28.2*    414 390   MCV 71* 70* 71*   MCH 21.2* 21.6* 21.6*   MCHC 30.0* 30.9* 30.5*     CMP:     Recent Labs   Lab 05/09/23  0450 05/10/23  0504 05/11/23  0524   * 125* 136*   CALCIUM 8.8 9.2 9.2   ALBUMIN 2.3* 2.6* 2.6*   PROT 6.6 7.2 7.0    140 137   K 4.1 4.4 4.1   CO2 25 23 21*    107 106   BUN 24* 18 17   CREATININE 1.0 1.0 1.1   ALKPHOS 92 105 98   ALT 58* 73* 61*   AST 85* 88* 54*   BILITOT 0.6 0.6 0.5       Dietary Orders:  Diet Orders            Diet Dysphagia Mechanical Soft (IDDSI Level 5) [facility]; Cardiac (Low Na/Chol); Standard Tray: Dysphagia 2 (Mechanical Soft Ground) starting at 05/07 1459            Based on the following criteria, this patient qualifies for intravenous to oral conversion:  [x] The patients gastrointestinal tract is functioning (tolerating medications via oral or enteral route for 24 hours and tolerating food or enteral feeds for 24 hours).  [x] The patient is hemodynamically stable for 24 hours (heart rate <100 beats per minute, systolic blood pressure >99 mm Hg, and respiratory rate <20 breaths per minute).  [x] The patient shows clinical improvement (afebrile for at least 24 hours and white blood cell count downtrending or normalized). Additionally, the patient must be non-neutropenic (absolute neutrophil count >500  cells/mm3).  [x] For antimicrobials, the patient has received IV therapy for at least 24 hours.    IV medication (clindamycin 600 mg q8h) will be changed to oral medication (clindamycin 300 mg q6h)    Pharmacist's Name: Chanda Ramirez  Pharmacist's Extension: [phone]

## 2023-05-12 NOTE — ASSESSMENT & PLAN NOTE
, , .  Total bilirubin 1.3 on arrival  Acute hepatitis panel negative  Abdominal ultrasound shows no acute abnormality  LFTs downtrending, almost normal   Possibly fatty liver disease

## 2023-05-12 NOTE — ASSESSMENT & PLAN NOTE
Patient with acute kidney injury likely due to IVVD/dehydration and acute tubular necrosis LUISA is currently improving. Labs reviewed- Renal function/electrolytes with Estimated Creatinine Clearance: 82.3 mL/min (based on SCr of 1.1 mg/dL). according to latest data. Monitor urine output and serial BMP and adjust therapy as needed. Avoid nephrotoxins and renally dose meds for GFR listed above.     creatinine elevated 3.5 on admission, downtrended with IV fluid administration   Nephrology consulted and have signed off   IV fluid discontinued given history of CHF noted on care everywhere and patient is able to tolerate oral intake, creatinine has been stable around 1  Baseline renal function unclear

## 2023-05-12 NOTE — ASSESSMENT & PLAN NOTE
Body mass index is 47 kg/m². Morbid obesity complicates all aspects of disease management from diagnostic modalities to treatment. Weight loss encouraged and health benefits explained to patient.        What Type Of Note Output Would You Prefer (Optional)?: Standard Output Hpi Title: Evaluation of a Skin Lesion How Severe Are Your Spot(S)?: mild Have Your Spot(S) Been Treated In The Past?: has not been treated

## 2023-05-13 LAB
ALBUMIN SERPL BCP-MCNC: 2.8 G/DL (ref 3.5–5.2)
ALP SERPL-CCNC: 87 U/L (ref 55–135)
ALT SERPL W/O P-5'-P-CCNC: 42 U/L (ref 10–44)
ANION GAP SERPL CALC-SCNC: 11 MMOL/L (ref 8–16)
AST SERPL-CCNC: 30 U/L (ref 10–40)
BASOPHILS # BLD AUTO: 0.08 K/UL (ref 0–0.2)
BASOPHILS NFR BLD: 0.6 % (ref 0–1.9)
BILIRUB SERPL-MCNC: 0.5 MG/DL (ref 0.1–1)
BUN SERPL-MCNC: 21 MG/DL (ref 6–20)
CALCIUM SERPL-MCNC: 9.3 MG/DL (ref 8.7–10.5)
CHLORIDE SERPL-SCNC: 108 MMOL/L (ref 95–110)
CO2 SERPL-SCNC: 21 MMOL/L (ref 23–29)
CREAT SERPL-MCNC: 1.1 MG/DL (ref 0.5–1.4)
DIFFERENTIAL METHOD: ABNORMAL
EOSINOPHIL # BLD AUTO: 0.2 K/UL (ref 0–0.5)
EOSINOPHIL NFR BLD: 1.3 % (ref 0–8)
ERYTHROCYTE [DISTWIDTH] IN BLOOD BY AUTOMATED COUNT: 16.3 % (ref 11.5–14.5)
EST. GFR  (NO RACE VARIABLE): >60 ML/MIN/1.73 M^2
GLUCOSE SERPL-MCNC: 126 MG/DL (ref 70–110)
HCT VFR BLD AUTO: 27.6 % (ref 37–48.5)
HGB BLD-MCNC: 8.3 G/DL (ref 12–16)
IMM GRANULOCYTES # BLD AUTO: 0.12 K/UL (ref 0–0.04)
IMM GRANULOCYTES NFR BLD AUTO: 0.9 % (ref 0–0.5)
LYMPHOCYTES # BLD AUTO: 2.3 K/UL (ref 1–4.8)
LYMPHOCYTES NFR BLD: 18 % (ref 18–48)
MCH RBC QN AUTO: 21.6 PG (ref 27–31)
MCHC RBC AUTO-ENTMCNC: 30.1 G/DL (ref 32–36)
MCV RBC AUTO: 72 FL (ref 82–98)
MONOCYTES # BLD AUTO: 0.6 K/UL (ref 0.3–1)
MONOCYTES NFR BLD: 4.5 % (ref 4–15)
NEUTROPHILS # BLD AUTO: 9.5 K/UL (ref 1.8–7.7)
NEUTROPHILS NFR BLD: 74.7 % (ref 38–73)
NRBC BLD-RTO: 0 /100 WBC
PLATELET # BLD AUTO: 389 K/UL (ref 150–450)
PMV BLD AUTO: 10.7 FL (ref 9.2–12.9)
POCT GLUCOSE: 116 MG/DL (ref 70–110)
POCT GLUCOSE: 125 MG/DL (ref 70–110)
POCT GLUCOSE: 127 MG/DL (ref 70–110)
POCT GLUCOSE: 134 MG/DL (ref 70–110)
POTASSIUM SERPL-SCNC: 4.2 MMOL/L (ref 3.5–5.1)
PROT SERPL-MCNC: 6.8 G/DL (ref 6–8.4)
RBC # BLD AUTO: 3.84 M/UL (ref 4–5.4)
SODIUM SERPL-SCNC: 140 MMOL/L (ref 136–145)
WBC # BLD AUTO: 12.77 K/UL (ref 3.9–12.7)

## 2023-05-13 PROCEDURE — 63600175 PHARM REV CODE 636 W HCPCS: Performed by: INTERNAL MEDICINE

## 2023-05-13 PROCEDURE — 25000003 PHARM REV CODE 250: Performed by: INTERNAL MEDICINE

## 2023-05-13 PROCEDURE — 36415 COLL VENOUS BLD VENIPUNCTURE: CPT | Performed by: INTERNAL MEDICINE

## 2023-05-13 PROCEDURE — 21400001 HC TELEMETRY ROOM

## 2023-05-13 PROCEDURE — 80053 COMPREHEN METABOLIC PANEL: CPT | Performed by: INTERNAL MEDICINE

## 2023-05-13 PROCEDURE — 97116 GAIT TRAINING THERAPY: CPT | Mod: CQ

## 2023-05-13 PROCEDURE — 97530 THERAPEUTIC ACTIVITIES: CPT | Mod: CQ

## 2023-05-13 PROCEDURE — 85025 COMPLETE CBC W/AUTO DIFF WBC: CPT | Performed by: INTERNAL MEDICINE

## 2023-05-13 PROCEDURE — 25000003 PHARM REV CODE 250: Performed by: STUDENT IN AN ORGANIZED HEALTH CARE EDUCATION/TRAINING PROGRAM

## 2023-05-13 RX ORDER — HYDROCODONE BITARTRATE AND ACETAMINOPHEN 5; 325 MG/1; MG/1
1 TABLET ORAL EVERY 6 HOURS PRN
Status: DISCONTINUED | OUTPATIENT
Start: 2023-05-13 | End: 2023-05-16 | Stop reason: HOSPADM

## 2023-05-13 RX ADMIN — HEPARIN SODIUM 5000 UNITS: 5000 INJECTION INTRAVENOUS; SUBCUTANEOUS at 08:05

## 2023-05-13 RX ADMIN — LOSARTAN POTASSIUM 25 MG: 25 TABLET, FILM COATED ORAL at 08:05

## 2023-05-13 RX ADMIN — CLINDAMYCIN HYDROCHLORIDE 300 MG: 150 CAPSULE ORAL at 11:05

## 2023-05-13 RX ADMIN — CLINDAMYCIN HYDROCHLORIDE 300 MG: 150 CAPSULE ORAL at 05:05

## 2023-05-13 RX ADMIN — ACETAMINOPHEN 650 MG: 325 TABLET ORAL at 10:05

## 2023-05-13 RX ADMIN — ASPIRIN 81 MG CHEWABLE TABLET 81 MG: 81 TABLET CHEWABLE at 08:05

## 2023-05-13 RX ADMIN — ATORVASTATIN CALCIUM 80 MG: 40 TABLET, FILM COATED ORAL at 08:05

## 2023-05-13 RX ADMIN — CLOPIDOGREL BISULFATE 75 MG: 75 TABLET ORAL at 08:05

## 2023-05-13 RX ADMIN — AMLODIPINE BESYLATE 10 MG: 10 TABLET ORAL at 08:05

## 2023-05-13 NOTE — SUBJECTIVE & OBJECTIVE
Interval History: No acute events overnight.  Seen and examined without any family present.  Reports some back pain and that she has been able to participate more with therapy today.  Discussed with patient about sitting up in chair for longer periods rather than staying in bed.  Tylenol and Norco available p.r.n. for pain      Review of Systems  Objective:     Vital Signs (Most Recent):  Temp: 98 °F (36.7 °C) (05/13/23 0721)  Pulse: 105 (05/13/23 0920)  Resp: 18 (05/13/23 0721)  BP: (!) 145/70 (05/13/23 0721)  SpO2: 98 % (05/13/23 0721) Vital Signs (24h Range):  Temp:  [97.9 °F (36.6 °C)-99 °F (37.2 °C)] 98 °F (36.7 °C)  Pulse:  [] 105  Resp:  [18-20] 18  SpO2:  [97 %-100 %] 98 %  BP: (118-184)/(69-87) 145/70     Weight: 121.9 kg (268 lb 11.9 oz)  Body mass index is 46.13 kg/m².  No intake or output data in the 24 hours ending 05/13/23 1124      Physical Exam  Vitals and nursing note reviewed.   Constitutional:       Appearance: She is morbidly obese.   HENT:      Head: Normocephalic and atraumatic.      Right Ear: External ear normal.      Left Ear: External ear normal.   Eyes:      Pupils: Pupils are equal, round, and reactive to light.   Cardiovascular:      Rate and Rhythm: Normal rate and regular rhythm.   Pulmonary:      Effort: Pulmonary effort is normal. No accessory muscle usage or respiratory distress.      Breath sounds: No wheezing.   Abdominal:      General: Bowel sounds are normal. There is no distension.      Palpations: Abdomen is soft.      Tenderness: There is no abdominal tenderness. There is no guarding or rebound.   Musculoskeletal:      Cervical back: Neck supple.      Right lower leg: No edema.      Left lower leg: No edema.   Skin:     General: Skin is warm and dry.   Neurological:      Mental Status: She is alert and oriented to person, place, and time.      Motor: Weakness present.           Significant Labs: All pertinent labs within the past 24 hours have been reviewed.  CBC:    Recent Labs   Lab 05/12/23 0458 05/13/23 0453   WBC 12.57 12.77*   HGB 8.4* 8.3*   HCT 28.2* 27.6*    389     CMP:   Recent Labs   Lab 05/12/23 0458 05/13/23 0453    140   K 4.2 4.2    108   CO2 20* 21*   * 126*   BUN 19 21*   CREATININE 1.1 1.1   CALCIUM 9.2 9.3   PROT 6.9 6.8   ALBUMIN 2.7* 2.8*   BILITOT 0.5 0.5   ALKPHOS 96 87   AST 40 30   ALT 54* 42   ANIONGAP 11 11       Significant Imaging: I have reviewed all pertinent imaging results/findings within the past 24 hours.

## 2023-05-13 NOTE — ASSESSMENT & PLAN NOTE
This patient does have evidence of infective focus  My overall impression is sepsis.  Source: Skin and Soft Tissue (location Back)  Antibiotics given-   Antibiotics (72h ago, onward)    Start     Stop Route Frequency Ordered    05/12/23 0600  clindamycin capsule 300 mg         05/14 2359 Oral Every 6 hours 05/12/23 0221        Latest lactate reviewed-  No results for input(s): LACTATE in the last 72 hours.  Organ dysfunction indicated by Acute kidney injury and Acute liver injury    Fluid challenge Ideal Body Weight- The patient's ideal body weight is Ideal body weight: 54.7 kg (120 lb 9.5 oz) which will be used to calculate fluid bolus of 30 ml/kg for treatment of septic shock.      Post- resuscitation assessment No - Post resuscitation assessment not needed       Will Not start Pressors- Levophed for MAP of 65  Source control achieved by:  IV antibiotics.      -afebrile.  HR .  WBC 55085, 0% bands, elevated procalcitonin on admission  -Leukocytosis has resolved  -possible multiple etiologies of infectious process secondary to UTI and back abscess  -blood cultures NGTD, urine culture with no growth  -Zosyn discontinued given urine culture showed no growth  -s/p I&D of back abscess on 05/07/2023  -transitioned to oral clindamycin, will finish 7 day course post I&D

## 2023-05-13 NOTE — PLAN OF CARE
A207/A207 NICOLLE Roper is a 47 y.o.female admitted on 5/3/2023 for Acute metabolic encephalopathy   Code Status: Full Code MRN: [id number]   Review of patient's allergies indicates:  No Known Allergies  Past Medical History:   Diagnosis Date    Diabetes mellitus     Ovarian cancer     Stroke       PRN meds    sodium chloride 0.9%, , PRN  acetaminophen, 650 mg, Q6H PRN  dextrose 10%, 12.5 g, PRN  dextrose 10%, 25 g, PRN  dextrose, 15 g, PRN  dextrose, 30 g, PRN  glucagon (human recombinant), 1 mg, PRN  hydrALAZINE, 10 mg, Q8H PRN  HYDROcodone-acetaminophen, 1 tablet, Q6H PRN  insulin aspart U-100, 0-5 Units, QID (AC + HS) PRN  labetaloL, 10 mg, Q6H PRN  ondansetron, 4 mg, Q8H PRN  sodium chloride 0.9%, 10 mL, PRN  sodium chloride 0.9%, 10 mL, PRN      Four eyes on skin with MARKO Bermudez. New wound noted on right posterior upper hip region possibly due to brief. Paget, Dr. Taarea notified and wound care consulted. New plan for the patient to not wear briefs, but absorbant pads placed. Pt seen by PT. Wound cleaned and bandaged with foam. Wound care completed on penrose drain site.  Pt ambulated with help multiple times and sitting up in bed throughout the day. No falls reported during shift and hourly rounding is complete. Cardiac and glucose monitoring continues.  Chart check completed. Will continue plan of care.      Orientation: oriented x 4  [place] Coma Scale Score: 15     Lead Monitored: Lead II Rhythm: normal sinus rhythm    Cardiac/Telemetry Box Number: [id number]  VTE Required Core Measure: Pharmacological prophylaxis initiated/maintained Last Bowel Movement: 05/11/23  Diet Dysphagia Mechanical Soft (IDDSI Level 5) [facility]; Cardiac (Low Na/Chol); Standard Tray  Voiding Characteristics: voids spontaneously without difficulty  Luca Score: 17  Fall Risk Score: 16  Accucheck [x]   Freq? Q4      Lines/Drains/Airways       Drain  Duration                  Open Drain 05/07/23 1152 Midline;Posterior Back  Penrose  6 days              Peripheral Intravenous Line  Duration                  Midline Catheter Insertion/Assessment  - Single Lumen 05/07/23 0729 Left cephalic vein (lateral side of arm) other (see comments) 6 days

## 2023-05-13 NOTE — ASSESSMENT & PLAN NOTE
Patient with acute kidney injury likely due to IVVD/dehydration and acute tubular necrosis LUISA is currently improving. Labs reviewed- Renal function/electrolytes with Estimated Creatinine Clearance: 81.4 mL/min (based on SCr of 1.1 mg/dL). according to latest data. Monitor urine output and serial BMP and adjust therapy as needed. Avoid nephrotoxins and renally dose meds for GFR listed above.     creatinine elevated 3.5 on admission, downtrended with IV fluid administration   Nephrology consulted and have signed off   IV fluid discontinued given history of CHF noted on care everywhere and patient is able to tolerate oral intake, creatinine has been stable around 1  Baseline renal function unclear

## 2023-05-13 NOTE — PT/OT/SLP PROGRESS
Physical Therapy  Treatment    Kenia Roper   MRN: [id number]   Admitting Diagnosis: Acute metabolic encephalopathy       PT Start Time: 0855     PT Stop Time: 0920    PT Total Time (min): 25 min       Billable Minutes:  Gait Training 15 and Therapeutic Activity 10       PT/PTA: PTA     Number of PTA visits since last PT visit: 3       General Precautions: Standard, fall  Orthopedic Precautions: N/A  Braces: N/A  Spiritual, Cultural Beliefs, Anabaptism Practices, Values that Affect Care: no    Subjective:  Communicated with WILEY prior to session.      Pain/Comfort  Pain Rating 1: 0/10  Pain Rating Post-Intervention 1: 0/10    Treatment and Education:     BED MOB MIN A      SCOOT TO EOB SBA     SITTING EOB BALANCE S/D SBA WHILE ADJUSTING SOCKS     SIT<-->STAND MIN A      RW  X 60' CG WITH SLOW BUT SAFE RHYS     EDUCATED ON CALL DON'T FALL PROCEDURES    AM-PAC 6 CLICK MOBILITY  How much help from another person does this patient currently need?   1 = Unable, Total/Dependent Assistance  2 = A lot, Maximum/Moderate Assistance  3 = A little, Minimum/Contact Guard/Supervision  4 = None, Modified [place]/Independent    Turning over in bed (including adjusting bedclothes, sheets and blankets)?: 3  Sitting down on and standing up from a chair with arms (e.g., wheelchair, bedside commode, etc.): 3  Moving from lying on back to sitting on the side of the bed?: 3  Moving to and from a bed to a chair (including a wheelchair)?:  (NA)  Need to walk in hospital room?: 3  Climbing 3-5 steps with a railing?: 1    AM-PAC Raw Score CMS G-Code Modifier Level of Impairment Assistance   6 % Total / Unable   7 - 9 CM 80 - 100% Maximal Assist   10 - 14 CL 60 - 80% Moderate Assist   15 - 19 CK 40 - 60% Moderate Assist   20 - 22 CJ 20 - 40% Minimal Assist   23 CI 1-20% SBA / CGA   24 CH 0% Independent/ Mod I     Patient left up in chair with all lines intact, call button in reach, chair alarm on, and NSG  notified.    Assessment:  Kenia Roper is a 47 y.o. female with a medical diagnosis of Acute metabolic encephalopathy and presents with .    Rehab identified problem list/impairments: weakness, gait instability, impaired endurance, decreased lower extremity function, impaired self care skills, impaired functional mobility    Rehab potential is good.    Activity tolerance: Good    Discharge recommendations: rehabilitation facility      Barriers to discharge:      Equipment recommendations: other (see comments) (Equipment Needed After Discharge  to be determined by next level of care)     GOALS:   Multidisciplinary Problems       Physical Therapy Goals          Problem: Physical Therapy    Goal Priority Disciplines Outcome Goal Variances Interventions   Physical Therapy Goal     PT, PT/OT Ongoing, Progressing     Description: Pt will perform bed mobility SBA in order to participate in EOB activity.  Pt will perform transfers CGA in order to participate in OOB activity.   Pt will ambulate 50ft CGA with LRAD in order to participate in daily tasks.                         PLAN:    Patient to be seen 3 x/week to address the above listed problems via gait training, therapeutic activities, therapeutic exercises  Plan of Care expires: 05/18/23  Plan of Care reviewed with: patient         05/13/2023

## 2023-05-13 NOTE — PLAN OF CARE
BED MOB MIN A     SCOOT TO EOB SBA    SITTING EOB BALANCE S/D SBA WHILE ADJUSTING SOCKS    SIT<-->STAND MIN A     RW  X 60' CG WITH SLOW BUT SAFE RHYS    EDUCATED ON CALL DON'T FALL PROCEDURES

## 2023-05-13 NOTE — ASSESSMENT & PLAN NOTE
Body mass index is 46.13 kg/m². Morbid obesity complicates all aspects of disease management from diagnostic modalities to treatment. Weight loss encouraged and health benefits explained to patient.

## 2023-05-13 NOTE — PROGRESS NOTES
[facility]  Progress Note    Patient Name: Kenia Roper  MRN: [id number]  Patient Class: IP- Inpatient   Admission Date: 5/3/2023  Length of Stay: 9 days  Attending Physician: Heydi Cardozo DO  Primary Care Provider: Ross Perdomo MD        Subjective:     Principal Problem:Acute metabolic encephalopathy        HPI:  Ms. Roper is a 57-year-old morbidly obese  female, was brought in to the ED in an unresponsive state.  No family at the bedside, unable to obtain any information from patient.  No information per Care everywhere.  Most of the information obtained from ED staff.  Per report, patient was found down at home, covered in feces and urine.  Last known well two days ago.  No report of fever or chills.  Known history of CVA and type 2 diabetes mellitus.  In the ED she is afebrile, mildly tachycardic HR .  WBC elevated 20,000. BUN 60, creatinine 3.5 (unknown baseline).  Elevated LFTs in the 400s.  Troponin elevated 0.259, unknown if she has chest pain or SOB.  EKG NSR with no ST T wave changes.  Hemodynamically stable.  Presumed severe sepsis with LUISA/AMS, she received 30 mL/kilogram bolus in the ED along with IV Zosyn empirically.  Cultures obtained.  UDS positive for opiates.  CT head is negative for acute intracranial pathology.  CXR negative for infectious process.    Admitting diagnosis:  Severe sepsis.  Acute encephalopathy (TIA versus uremia).  LUISA.  Elevated troponin.  Elevated LFTs.      Overview/Hospital Course:  MRI brain revealed bilateral small scattered acute infarcts favoring embolic type infarcts.  Neurology consulted    Upon further examination, patient was found to have a large middle back abscess with purulent drainage.  CT of thoracic spine done without contrast showed subcutaneous on organized air-fluid collection dorsally consistent with clinical abscess or gas producing infection with no overt underlying bony change or  involvement of the spinal canal.  Patient started on empiric antibiotics with Zosyn and clindamycin.  General surgery consulted and recommended MRI of thoracic spine and Neurosurgery consultation.  MRI of thoracic spine showed no overt bony marrow edema and no overt compromise of the thoracic canal, subcutaneous abscess or phlegmon with cutaneous fistula with no overt extension into the spinal canal.    Neurosurgery recommends no neurosurgical intervention as there is no evidence of cord compromise.  General surgery performed I&D of back abscess on 05/07/2023.  Blood cultures with no growth.  Per General surgery, patient will require daily wet-to-dry dressing changes with Kerlix gauze, keep Penrose in place.  Patient will need to follow up surgery Clinic 2 weeks postop at which time Penrose will be removed    Echocardiogram showed LVEF 55%, normal left ventricular diastolic function, no evidence of intracardiac shunting.  She will need cardiac monitoring to rule out AFib    Mentation improved, patient awake, alert, oriented although notable to have lower extremity weakness and difficulty with word finding.  Neurology consulted and recommended MRA brain which showed degraded image, scattered bilateral foci acute ischemia supratentorial similar to MRI brain, apparent relative diminished flow of left MCA which may be partially occlusive, otherwise no large vessel occlusion or critical stenosis noted.    THERAPISTS' RECS  --SLP: Soft & Bite Sized Diet - IDDSI Level 6, Liquid Diet Level: Thin liquids - IDDSI Level 0   --PT: ambulated 3 ft min A with RW  --rehab still recommended for post acute therapy    Initially patient decided to refuse rehab placement, however after extensive discussion with patient regarding her need for significant assistance at this time and lack of support at home as patient does not have any family other than her daughter and grandson who can help her at home.  Case management working on  placement for discharge.  Accepted at [facility], awaiting insurance authorization.      Interval History: No acute events overnight.  Seen and examined without any family present.  Reports some back pain and that she has been able to participate more with therapy today.  Discussed with patient about sitting up in chair for longer periods rather than staying in bed.  Tylenol and Norco available p.r.n. for pain      Review of Systems  Objective:     Vital Signs (Most Recent):  Temp: 98 °F (36.7 °C) (05/13/23 0721)  Pulse: 105 (05/13/23 0920)  Resp: 18 (05/13/23 0721)  BP: (!) 145/70 (05/13/23 0721)  SpO2: 98 % (05/13/23 0721) Vital Signs (24h Range):  Temp:  [97.9 °F (36.6 °C)-99 °F (37.2 °C)] 98 °F (36.7 °C)  Pulse:  [] 105  Resp:  [18-20] 18  SpO2:  [97 %-100 %] 98 %  BP: (118-184)/(69-87) 145/70     Weight: 121.9 kg (268 lb 11.9 oz)  Body mass index is 46.13 kg/m².  No intake or output data in the 24 hours ending 05/13/23 1124      Physical Exam  Vitals and nursing note reviewed.   Constitutional:       Appearance: She is morbidly obese.   HENT:      Head: Normocephalic and atraumatic.      Right Ear: External ear normal.      Left Ear: External ear normal.   Eyes:      Pupils: Pupils are equal, round, and reactive to light.   Cardiovascular:      Rate and Rhythm: Normal rate and regular rhythm.   Pulmonary:      Effort: Pulmonary effort is normal. No accessory muscle usage or respiratory distress.      Breath sounds: No wheezing.   Abdominal:      General: Bowel sounds are normal. There is no distension.      Palpations: Abdomen is soft.      Tenderness: There is no abdominal tenderness. There is no guarding or rebound.   Musculoskeletal:      Cervical back: Neck supple.      Right lower leg: No edema.      Left lower leg: No edema.   Skin:     General: Skin is warm and dry.   Neurological:      Mental Status: She is alert and oriented to person, place, and time.      Motor: Weakness present.            Significant Labs: All pertinent labs within the past 24 hours have been reviewed.  CBC:   Recent Labs   Lab 05/12/23 0458 05/13/23 0453   WBC 12.57 12.77*   HGB 8.4* 8.3*   HCT 28.2* 27.6*    389     CMP:   Recent Labs   Lab 05/12/23 0458 05/13/23 0453    140   K 4.2 4.2    108   CO2 20* 21*   * 126*   BUN 19 21*   CREATININE 1.1 1.1   CALCIUM 9.2 9.3   PROT 6.9 6.8   ALBUMIN 2.7* 2.8*   BILITOT 0.5 0.5   ALKPHOS 96 87   AST 40 30   ALT 54* 42   ANIONGAP 11 11       Significant Imaging: I have reviewed all pertinent imaging results/findings within the past 24 hours.      Assessment/Plan:      * Acute metabolic encephalopathy  Patient found down at home, last well-known two days prior to ED presentation  History of CVA, lives at home with family and apparently able to participate in ADLs including babysitting her 6-year-old grandchild  CT head negative for acute intracranial pathology, however MRI brain revealed bilateral small acute infarcts consistent with embolic stroke  Also found to have back abscess on spine imaging  Metabolic encephalopathy noted on admission apparently multifactorial in etiology secondary to stroke and infectious process   Mentation improved, appears to be back to baseline    Elevated CPK  Elevation likely secondary to muscle breakdown as patient was found down at home for unknown period of time  Trended down with IV fluid    Cerebrovascular accident (CVA) due to embolism  Patient noted to have multiple areas of infarction on both hemispheres concerning for embolic phenomena  Carotid ultrasound showed no significant stenosis     Antithrombotics for secondary stroke prevention: Antiplatelets: Aspirin: 81 mg daily  Clopidogrel: 75 mg daily    Statins for secondary stroke prevention and hyperlipidemia, if present:   Statins: Atorvastatin- 80 mg daily    Aggressive risk factor modification: HTN, DM, Obesity     Rehab efforts: The patient has been evaluated by a  stroke team provider and the therapy needs have been fully considered based off the presenting complaints and exam findings. The following therapy evaluations are needed: PT evaluate and treat, OT evaluate and treat, SLP evaluate and treat    Diagnostics ordered/pending: MRA head to assess vasculature, TTE to assess cardiac function/status , TSH to assess thyroid function    VTE prophylaxis: Enoxaparin 40 mg SQ every 24 hours    BP parameters: Infarct: No intervention, SBP <220     Patient is past permissive hypertension period.  Optimize BP  Patient will need cardiac monitoring given embolic nature of stroke with no evidence of intracardiac shunting    PT/OT recommended rehab placement on discharge   SLP recommends dysphagia soft diet with thin liquid    Back abscess  Patient found to have drainage of purulence material from her back  CT of thoracic spine confirms abscess, MRI shows no spine involvement  General surgery consulted, recommended neurosurgery evaluation  No surgical intervention recommended  S/p I and D of 10 x 8 cm back abscess with Penrose drain placement by General surgery on 05/07/2023  Local wound care with daily wet-to-dry dressing changes  Continue clindamycin  Follow up with General surgery 2 weeks postop for Penrose drain removal      High anion gap metabolic acidosis  Resolved with IV fluids in setting of LUISA versus LUISA superimposed on CKD  Monitor      Elevated LFTs  , , .  Total bilirubin 1.3 on arrival  Acute hepatitis panel negative  Abdominal ultrasound shows no acute abnormality  LFTs downtrending, almost normal   Possibly fatty liver disease    Type 2 diabetes mellitus with hyperglycemia  Blood sugar mildly elevated 166 on admission  Hemoglobin A1c 7.1    Accu-Cheks with SSI  Diabetic diet  Optimize diabetic regimen in setting of acute embolic stroke  Patient admits that she has not been on any meds outpatient for a couple of years, although she previously was on  metformin  Restart metformin on discharge    Elevated troponin  Troponin elevated 0.25 on admission, in the setting of elevated creatinine 3.5.  Repeat troponin remained flat  EKG NSR with no ST or T-wave changes.    Likely demand ischemia in setting of infectious process and acute embolic stroke  Although patient initially encephalopathic, now that mentation has improved denies any chest pain    LUISA (acute kidney injury)  Patient with acute kidney injury likely due to IVVD/dehydration and acute tubular necrosis LUISA is currently improving. Labs reviewed- Renal function/electrolytes with Estimated Creatinine Clearance: 81.4 mL/min (based on SCr of 1.1 mg/dL). according to latest data. Monitor urine output and serial BMP and adjust therapy as needed. Avoid nephrotoxins and renally dose meds for GFR listed above.     creatinine elevated 3.5 on admission, downtrended with IV fluid administration   Nephrology consulted and have signed off   IV fluid discontinued given history of CHF noted on care everywhere and patient is able to tolerate oral intake, creatinine has been stable around 1  Baseline renal function unclear    Severe sepsis  This patient does have evidence of infective focus  My overall impression is sepsis.  Source: Skin and Soft Tissue (location Back)  Antibiotics given-   Antibiotics (72h ago, onward)    Start     Stop Route Frequency Ordered    05/12/23 0600  clindamycin capsule 300 mg         05/14 2359 Oral Every 6 hours 05/12/23 0221        Latest lactate reviewed-  No results for input(s): LACTATE in the last 72 hours.  Organ dysfunction indicated by Acute kidney injury and Acute liver injury    Fluid challenge Ideal Body Weight- The patient's ideal body weight is Ideal body weight: 54.7 kg (120 lb 9.5 oz) which will be used to calculate fluid bolus of 30 ml/kg for treatment of septic shock.      Post- resuscitation assessment No - Post resuscitation assessment not needed       Will Not start  Pressors- Levophed for MAP of 65  Source control achieved by:  IV antibiotics.      -afebrile.  HR .  WBC 63628, 0% bands, elevated procalcitonin on admission  -Leukocytosis has resolved  -possible multiple etiologies of infectious process secondary to UTI and back abscess  -blood cultures NGTD, urine culture with no growth  -Zosyn discontinued given urine culture showed no growth  -s/p I&D of back abscess on 05/07/2023  -transitioned to oral clindamycin, will finish 7 day course post I&D    Morbid obesity with BMI of 45.0-49.9, adult  Body mass index is 46.13 kg/m². Morbid obesity complicates all aspects of disease management from diagnostic modalities to treatment. Weight loss encouraged and health benefits explained to patient.           VTE Risk Mitigation (From admission, onward)         Ordered     heparin (porcine) injection 5,000 Units  Every 12 hours         05/04/23 0456     IP VTE HIGH RISK PATIENT  Once         05/04/23 0456     Place sequential compression device  Until discontinued         05/04/23 0456     Place sequential compression device  Until discontinued         05/04/23 0043                Discharge Planning   CRISPIN:      Code Status: Full Code   Is the patient medically ready for discharge?:     Reason for patient still in hospital (select all that apply): Pending disposition  Discharge Plan A: Rehab   Discharge Delays: None known at this time      Patient is medically stable.  Accepted at [facility], awaiting insurance authorization        Heydi Cardozo DO  Department of Hospital Medicine   [facility] - Telemetry ([facility])

## 2023-05-14 LAB
BASOPHILS # BLD AUTO: 0.07 K/UL (ref 0–0.2)
BASOPHILS NFR BLD: 0.6 % (ref 0–1.9)
DIFFERENTIAL METHOD: ABNORMAL
EOSINOPHIL # BLD AUTO: 0.2 K/UL (ref 0–0.5)
EOSINOPHIL NFR BLD: 1.4 % (ref 0–8)
ERYTHROCYTE [DISTWIDTH] IN BLOOD BY AUTOMATED COUNT: 16.8 % (ref 11.5–14.5)
HCT VFR BLD AUTO: 29.4 % (ref 37–48.5)
HGB BLD-MCNC: 8.7 G/DL (ref 12–16)
IMM GRANULOCYTES # BLD AUTO: 0.09 K/UL (ref 0–0.04)
IMM GRANULOCYTES NFR BLD AUTO: 0.8 % (ref 0–0.5)
LYMPHOCYTES # BLD AUTO: 2.6 K/UL (ref 1–4.8)
LYMPHOCYTES NFR BLD: 21.9 % (ref 18–48)
MCH RBC QN AUTO: 21.9 PG (ref 27–31)
MCHC RBC AUTO-ENTMCNC: 29.6 G/DL (ref 32–36)
MCV RBC AUTO: 74 FL (ref 82–98)
MONOCYTES # BLD AUTO: 0.5 K/UL (ref 0.3–1)
MONOCYTES NFR BLD: 4.2 % (ref 4–15)
NEUTROPHILS # BLD AUTO: 8.5 K/UL (ref 1.8–7.7)
NEUTROPHILS NFR BLD: 71.1 % (ref 38–73)
NRBC BLD-RTO: 0 /100 WBC
PLATELET # BLD AUTO: 359 K/UL (ref 150–450)
PMV BLD AUTO: 10.3 FL (ref 9.2–12.9)
POCT GLUCOSE: 112 MG/DL (ref 70–110)
POCT GLUCOSE: 124 MG/DL (ref 70–110)
POCT GLUCOSE: 131 MG/DL (ref 70–110)
POCT GLUCOSE: 189 MG/DL (ref 70–110)
RBC # BLD AUTO: 3.98 M/UL (ref 4–5.4)
WBC # BLD AUTO: 11.88 K/UL (ref 3.9–12.7)

## 2023-05-14 PROCEDURE — 25000003 PHARM REV CODE 250: Performed by: INTERNAL MEDICINE

## 2023-05-14 PROCEDURE — 36415 COLL VENOUS BLD VENIPUNCTURE: CPT | Performed by: INTERNAL MEDICINE

## 2023-05-14 PROCEDURE — 92507 TX SP LANG VOICE COMM INDIV: CPT

## 2023-05-14 PROCEDURE — 25000003 PHARM REV CODE 250: Performed by: STUDENT IN AN ORGANIZED HEALTH CARE EDUCATION/TRAINING PROGRAM

## 2023-05-14 PROCEDURE — 85025 COMPLETE CBC W/AUTO DIFF WBC: CPT | Performed by: INTERNAL MEDICINE

## 2023-05-14 PROCEDURE — 63600175 PHARM REV CODE 636 W HCPCS: Performed by: INTERNAL MEDICINE

## 2023-05-14 PROCEDURE — 21400001 HC TELEMETRY ROOM

## 2023-05-14 RX ORDER — LOSARTAN POTASSIUM 50 MG/1
50 TABLET ORAL DAILY
Status: DISCONTINUED | OUTPATIENT
Start: 2023-05-15 | End: 2023-05-16 | Stop reason: HOSPADM

## 2023-05-14 RX ORDER — LOSARTAN POTASSIUM 25 MG/1
25 TABLET ORAL ONCE
Status: COMPLETED | OUTPATIENT
Start: 2023-05-14 | End: 2023-05-14

## 2023-05-14 RX ADMIN — CLINDAMYCIN HYDROCHLORIDE 300 MG: 150 CAPSULE ORAL at 12:05

## 2023-05-14 RX ADMIN — ACETAMINOPHEN 650 MG: 325 TABLET ORAL at 12:05

## 2023-05-14 RX ADMIN — ASPIRIN 81 MG CHEWABLE TABLET 81 MG: 81 TABLET CHEWABLE at 08:05

## 2023-05-14 RX ADMIN — HEPARIN SODIUM 5000 UNITS: 5000 INJECTION INTRAVENOUS; SUBCUTANEOUS at 08:05

## 2023-05-14 RX ADMIN — CLINDAMYCIN HYDROCHLORIDE 300 MG: 150 CAPSULE ORAL at 06:05

## 2023-05-14 RX ADMIN — ATORVASTATIN CALCIUM 80 MG: 40 TABLET, FILM COATED ORAL at 09:05

## 2023-05-14 RX ADMIN — LOSARTAN POTASSIUM 25 MG: 25 TABLET, FILM COATED ORAL at 08:05

## 2023-05-14 RX ADMIN — CLOPIDOGREL BISULFATE 75 MG: 75 TABLET ORAL at 08:05

## 2023-05-14 RX ADMIN — HEPARIN SODIUM 5000 UNITS: 5000 INJECTION INTRAVENOUS; SUBCUTANEOUS at 09:05

## 2023-05-14 RX ADMIN — CLINDAMYCIN HYDROCHLORIDE 300 MG: 150 CAPSULE ORAL at 05:05

## 2023-05-14 RX ADMIN — LOSARTAN POTASSIUM 25 MG: 25 TABLET, FILM COATED ORAL at 03:05

## 2023-05-14 RX ADMIN — AMLODIPINE BESYLATE 10 MG: 10 TABLET ORAL at 08:05

## 2023-05-14 NOTE — CONSULTS
[facility] - Telemetry ([facility])  Adult Nutrition  Progress Note    SUMMARY       Recommendations  1.) Recommend Carb consistent diet restriction to current diet order  2.) Faraz BID     Goals:  1. Pt will continue to tolerate and consume >75% PO intake of meals by RD follow up   2. Pt diarrhea will improve by RD follow up   Nutrition Goal Status: progressing toward goal   Communication of RD Recs: other (comment)      1. Sepsis, due to unspecified organism, unspecified whether acute organ dysfunction present    2. Altered mental status    3. NSTEMI (non-ST elevated myocardial infarction)    4. LUISA (acute kidney injury)    5. Transaminitis    6. Elevated troponin    7. Severe sepsis    8. Back abscess    9. Acute metabolic encephalopathy    10. Embolic stroke    11. Cerebrovascular accident (CVA) due to embolism of cerebral artery      Past Medical History:   Diagnosis Date    Diabetes mellitus     Ovarian cancer     Stroke          Assessment and Plan    Nutrition Problem  Increased nutrient needs    Related to (etiology):   Altered GI function     Signs and Symptoms (as evidenced by):   Diarrhea    Interventions(treatment strategy):  1. Carbohydrate, Sodium and Fat, Soft bite sized food Level six Blue textured modified diet  2. Commercial food  3. Collaboration of care with medical providers      Nutrition Diagnosis Status:   New         Malnutrition Assessment  Skin (Micronutrient): edema (Luca score = 17 (mild risk)   Fluid Accumulation (Malnutrition):  (1+ trace)   Altered skin inegrity- right lumbar spine: partial thickness tissue loss         Reason for Assessment  Reason For Assessment: RD follow up/consult   5/10: Visited pt at bedside, pt on phone, just finished breakfast, visually confirmed 100% PO intake of meals. Pt reported having a good appetite, confirmed 100% PO intake of meals, pt requested that her diet is advanced, denies that she has chewing/ swallowing difficulties. Hospital Medicine Physician  "noted on 5/10 that the pt has improved mentation, LUISA resolved and that speech recommends IDDSI Level 6 soft and bite-sized. Pt reported experiencing some diarrhea, stated not experiencing any N/V, no abdominal pain/distention. Pt unsure of her normal weight, noted also had some difficulty with word finding. Pt appeared well nourished, no NFPE warranted at this time. Reviewed chart: LBM 5/9; Skin: incision back WDL; Luca score increased to 17 (mild); Edema: 1+ trace bilateral leg. Labs, meds, weight reviewed. Labs 5/10: Albumin (L), Glu (H), AST (H), ALT (H). Note clopidogrel, atorvastatin, amlodipine. Weight charted: 5/4 278 lbs, 5/9 273 lbs (BMI 47), -5 lb wt loss (1.8% wt change) x 5 days. RD will continue to monitor.    5/14: Consult received for altered skin integrity- wound noted. Onsite RD already following pt. Will add jovi with meals + consistent carb restriction.     Nutrition Risk Screen    Nutrition Risk Screen: no indicators present    Nutrition/Diet History  Spiritual, Cultural Beliefs, [employer]ism Practices, Values that Affect Care: no  Food Allergies: NKFA  Factors Affecting Nutritional Intake: impaired cognitive status/motor control, chewing difficulties/inability to chew food, difficulty/impaired swallowing, inability to feed self, NPO    Anthropometrics  Temp: 97.9 °F (36.6 °C)  Height: 5' 4"  Height (inches): 64 in  Weight Method: Bed Scale  Weight: 121.9 kg (268 lb 11.9 oz)  Weight (lb): 268.74 lb  Ideal Body Weight (IBW), Female: 120 lb  % Ideal Body Weight, Female (lb): 227.5 %  BMI (Calculated): 46.1  BMI Grade: greater than 40 - morbid obesity     Wt Readings from Last 15 Encounters:   05/13/23 121.9 kg (268 lb 11.9 oz)     Lab/Procedures/Meds    Pertinent Labs Reviewed: reviewed  Pertinent Labs Comments: Albumin (L), Na (H), Cl (H), Glu (H), BUN (H), Cr (H), Alk phos (H), Ast (H), ALT (H), eGFR 29  Pertinent Medications Reviewed: reviewed  Pertinent Medications Comments: Zosyn in D5W, " heparin, Abx  BMP  Lab Results   Component Value Date     05/13/2023    K 4.2 05/13/2023     05/13/2023    CO2 21 (L) 05/13/2023    BUN 21 (H) 05/13/2023    CREATININE 1.1 05/13/2023    CALCIUM 9.3 05/13/2023    ANIONGAP 11 05/13/2023    EGFRNORACEVR >60 05/13/2023     Lab Results   Component Value Date    ALT 42 05/13/2023    AST 30 05/13/2023    ALKPHOS 87 05/13/2023    BILITOT 0.5 05/13/2023     .  Lab Results   Component Value Date    ALBUMIN 2.8 (L) 05/13/2023     Recent Labs   Lab 05/14/23  1214   POCTGLUCOSE 131*     Lab Results   Component Value Date    HGBA1C 7.1 (H) 05/04/2023       Scheduled Meds:   amLODIPine  10 mg Oral Daily    aspirin  81 mg Oral Daily    atorvastatin  80 mg Oral QHS    clindamycin  300 mg Oral Q6H    clopidogreL  75 mg Oral Daily    heparin (porcine)  5,000 Units Subcutaneous Q12H    losartan  25 mg Oral Once    [START ON 5/15/2023] losartan  50 mg Oral Daily            Estimated/Assessed Needs  Weight Used For Calorie Calculations: 124.2 kg (273 lb 13 oz)  Energy Calorie Requirements (kcal): 1862 kcals (MSJ (Diabetic, Obese BMI 47)  [facility]  Protein Requirements: 69-93 g (15-20% EEN (DM)  Weight Used For Protein Calculations: 124.2 kg (273 lb 13 oz)  Fluid Requirements (mL): 1862 mL (1 mL/kcal)  Estimated Fluid Requirement Method: RDA Method  RDA Method (mL): 1862  CHO Requirement: 233 (1862 kcals/8)      Nutrition Prescription Ordered  Current Diet Order: Dysphagia Mechanical soft (Level 5), Cardiac diet     Evaluation of Received Nutrient/Fluid Intake  I/O: (Net since admit)  5/5: +1376.2 mL  5/10: +5811.1 mL    Energy Calories Required: meeting needs  Protein Required: meeting needs  Fluid Required: not meeting needs  Tolerance: tolerating   % Intake of Estimated Energy Needs: 100%  % Meal Intake: 100%    Nutrition Risk  Level of Risk/Frequency of Follow-up: Low (F/u x 2 weekly)     Monitor and Evaluation    Food and Nutrient Intake: energy  intake, food and beverage intake, enteral nutrition intake  Food and Nutrient Adminstration: diet order, enteral and parenteral nutrition administration  Knowledge/Beliefs/Attitudes: food and nutrition knowledge/skill, beliefs and attitudes  Anthropometric Measurements: weight, weight change, body mass index  Biochemical Data, Medical Tests and Procedures: electrolyte and renal panel, gastrointestinal profile, glucose/endocrine profile, inflammatory profile, lipid profile     Nutrition Follow-Up    RD Follow-up?: Yes

## 2023-05-14 NOTE — SUBJECTIVE & OBJECTIVE
Interval History: No acute events overnight.  Seen and examined without any family present.  Reported discomfort on her back at surgery site.  Denies any other complaints at this time.  Noted persistent elevated BP, losartan increased to 50 mg daily      Review of Systems  Objective:     Vital Signs (Most Recent):  Temp: 97.9 °F (36.6 °C) (05/14/23 1248)  Pulse: 86 (05/14/23 1248)  Resp: 18 (05/14/23 1248)  BP: (!) 167/70 (05/14/23 1248)  SpO2: 98 % (05/14/23 1248) Vital Signs (24h Range):  Temp:  [97.8 °F (36.6 °C)-99 °F (37.2 °C)] 97.9 °F (36.6 °C)  Pulse:  [72-91] 86  Resp:  [18-20] 18  SpO2:  [96 %-100 %] 98 %  BP: (131-174)/(59-84) 167/70     Weight: 121.9 kg (268 lb 11.9 oz)  Body mass index is 46.13 kg/m².    Intake/Output Summary (Last 24 hours) at 5/14/2023 1336  Last data filed at 5/13/2023 1730  Gross per 24 hour   Intake 400 ml   Output --   Net 400 ml         Physical Exam  Vitals and nursing note reviewed.   HENT:      Head: Normocephalic and atraumatic.      Right Ear: External ear normal.      Left Ear: External ear normal.   Eyes:      Pupils: Pupils are equal, round, and reactive to light.   Cardiovascular:      Rate and Rhythm: Regular rhythm.   Pulmonary:      Effort: Pulmonary effort is normal. No accessory muscle usage or respiratory distress.      Breath sounds: No wheezing.   Abdominal:      General: There is no distension.      Palpations: Abdomen is soft.      Tenderness: There is no abdominal tenderness. There is no guarding or rebound.   Musculoskeletal:      Cervical back: Neck supple.      Comments: Back surgical incision with dressing C/D/I   Skin:     General: Skin is warm and dry.   Neurological:      Mental Status: She is alert and oriented to person, place, and time.   Psychiatric:         Mood and Affect: Mood normal.         Behavior: Behavior is cooperative.           Significant Labs: All pertinent labs within the past 24 hours have been reviewed.  CBC:   Recent Labs   Lab  05/13/23  0453 05/14/23  0551   WBC 12.77* 11.88   HGB 8.3* 8.7*   HCT 27.6* 29.4*    359     CMP:   Recent Labs   Lab 05/13/23 0453      K 4.2      CO2 21*   *   BUN 21*   CREATININE 1.1   CALCIUM 9.3   PROT 6.8   ALBUMIN 2.8*   BILITOT 0.5   ALKPHOS 87   AST 30   ALT 42   ANIONGAP 11       Significant Imaging: I have reviewed all pertinent imaging results/findings within the past 24 hours.

## 2023-05-14 NOTE — ASSESSMENT & PLAN NOTE
Patient found to have drainage of purulence material from her back  CT of thoracic spine confirms abscess, MRI shows no spine involvement  General surgery consulted, recommended neurosurgery evaluation  No surgical intervention recommended  S/p I and D of 10 x 8 cm back abscess with Penrose drain placement by General surgery on 05/07/2023  Local wound care with daily wet-to-dry dressing changes  Finish 7 day course of clindamycin postop  Follow up with General surgery 2 weeks postop for Penrose drain removal

## 2023-05-14 NOTE — ASSESSMENT & PLAN NOTE
This patient does have evidence of infective focus  My overall impression is sepsis.  Source: Skin and Soft Tissue (location Back)  Antibiotics given-   Antibiotics (72h ago, onward)    Start     Stop Route Frequency Ordered    05/12/23 0600  clindamycin capsule 300 mg         05/14 2359 Oral Every 6 hours 05/12/23 0221        Latest lactate reviewed-  No results for input(s): LACTATE in the last 72 hours.  Organ dysfunction indicated by Acute kidney injury and Acute liver injury    Fluid challenge Ideal Body Weight- The patient's ideal body weight is Ideal body weight: 54.7 kg (120 lb 9.5 oz) which will be used to calculate fluid bolus of 30 ml/kg for treatment of septic shock.      Post- resuscitation assessment No - Post resuscitation assessment not needed       Will Not start Pressors- Levophed for MAP of 65  Source control achieved by:  IV antibiotics.      -afebrile.  HR .  WBC 15716, 0% bands, elevated procalcitonin on admission  -Leukocytosis has resolved  -possible multiple etiologies of infectious process secondary to UTI and back abscess  -blood cultures NGTD, urine culture with no growth  -Zosyn discontinued given urine culture showed no growth  -s/p I&D of back abscess on 05/07/2023  -transitioned to oral clindamycin, finish 7 day course post I&D

## 2023-05-14 NOTE — PROGRESS NOTES
[facility] Medicine  Progress Note    Patient Name: Kenia Roper  MRN: [id number]  Patient Class: IP- Inpatient   Admission Date: 5/3/2023  Length of Stay: 10 days  Attending Physician: Heydi Cardozo DO  Primary Care Provider: No primary care provider on file.        Subjective:     Principal Problem:Acute metabolic encephalopathy        HPI:  Ms. Roper is a 57-year-old morbidly obese  female, was brought in to the ED in an unresponsive state.  No family at the bedside, unable to obtain any information from patient.  No information per Care everywhere.  Most of the information obtained from ED staff.  Per report, patient was found down at home, covered in feces and urine.  Last known well two days ago.  No report of fever or chills.  Known history of CVA and type 2 diabetes mellitus.  In the ED she is afebrile, mildly tachycardic HR .  WBC elevated 20,000. BUN 60, creatinine 3.5 (unknown baseline).  Elevated LFTs in the 400s.  Troponin elevated 0.259, unknown if she has chest pain or SOB.  EKG NSR with no ST T wave changes.  Hemodynamically stable.  Presumed severe sepsis with LUISA/AMS, she received 30 mL/kilogram bolus in the ED along with IV Zosyn empirically.  Cultures obtained.  UDS positive for opiates.  CT head is negative for acute intracranial pathology.  CXR negative for infectious process.    Admitting diagnosis:  Severe sepsis.  Acute encephalopathy (TIA versus uremia).  LUISA.  Elevated troponin.  Elevated LFTs.      Overview/Hospital Course:  MRI brain revealed bilateral small scattered acute infarcts favoring embolic type infarcts.  Neurology consulted    Upon further examination, patient was found to have a large middle back abscess with purulent drainage.  CT of thoracic spine done without contrast showed subcutaneous on organized air-fluid collection dorsally consistent with clinical abscess or gas producing infection with no overt underlying bony  change or involvement of the spinal canal.  Patient started on empiric antibiotics with Zosyn and clindamycin.  General surgery consulted and recommended MRI of thoracic spine and Neurosurgery consultation.  MRI of thoracic spine showed no overt bony marrow edema and no overt compromise of the thoracic canal, subcutaneous abscess or phlegmon with cutaneous fistula with no overt extension into the spinal canal.    Neurosurgery recommends no neurosurgical intervention as there is no evidence of cord compromise.  General surgery performed I&D of back abscess on 05/07/2023.  Blood cultures with no growth.  Per General surgery, patient will require daily wet-to-dry dressing changes with Kerlix gauze, keep Penrose in place.  Patient will need to follow up surgery Clinic 2 weeks postop at which time Penrose will be removed    Echocardiogram showed LVEF 55%, normal left ventricular diastolic function, no evidence of intracardiac shunting.  She will need cardiac monitoring to rule out AFib Given embolic nature of stroke    Mentation improved, patient awake, alert, oriented although notable to have lower extremity weakness and difficulty with word finding.  Neurology consulted and recommended MRA brain which showed degraded image, scattered bilateral foci acute ischemia supratentorial similar to MRI brain, apparent relative diminished flow of left MCA which may be partially occlusive, otherwise no large vessel occlusion or critical stenosis noted.    THERAPISTS' RECS  --SLP: Soft & Bite Sized Diet - IDDSI Level 6, Liquid Diet Level: Thin liquids - IDDSI Level 0   --PT: ambulated 3 ft min A with RW  --rehab still recommended for post acute therapy    Initially patient decided to refuse rehab placement, however after extensive discussion with patient regarding her need for significant assistance at this time and lack of support at home as patient does not have any family other than her daughter and grandson who can help her at  home.  Case management working on placement for discharge.  Accepted at [facility], awaiting insurance authorization.    Patient will need referral to establish with a PCP after discharge as she states she does not have a PCP      Interval History: No acute events overnight.  Seen and examined without any family present.  Reported discomfort on her back at surgery site.  Denies any other complaints at this time.  Noted persistent elevated BP, losartan increased to 50 mg daily      Review of Systems  Objective:     Vital Signs (Most Recent):  Temp: 97.9 °F (36.6 °C) (05/14/23 1248)  Pulse: 86 (05/14/23 1248)  Resp: 18 (05/14/23 1248)  BP: (!) 167/70 (05/14/23 1248)  SpO2: 98 % (05/14/23 1248) Vital Signs (24h Range):  Temp:  [97.8 °F (36.6 °C)-99 °F (37.2 °C)] 97.9 °F (36.6 °C)  Pulse:  [72-91] 86  Resp:  [18-20] 18  SpO2:  [96 %-100 %] 98 %  BP: (131-174)/(59-84) 167/70     Weight: 121.9 kg (268 lb 11.9 oz)  Body mass index is 46.13 kg/m².    Intake/Output Summary (Last 24 hours) at 5/14/2023 1336  Last data filed at 5/13/2023 1730  Gross per 24 hour   Intake 400 ml   Output --   Net 400 ml         Physical Exam  Vitals and nursing note reviewed.   HENT:      Head: Normocephalic and atraumatic.      Right Ear: External ear normal.      Left Ear: External ear normal.   Eyes:      Pupils: Pupils are equal, round, and reactive to light.   Cardiovascular:      Rate and Rhythm: Regular rhythm.   Pulmonary:      Effort: Pulmonary effort is normal. No accessory muscle usage or respiratory distress.      Breath sounds: No wheezing.   Abdominal:      General: There is no distension.      Palpations: Abdomen is soft.      Tenderness: There is no abdominal tenderness. There is no guarding or rebound.   Musculoskeletal:      Cervical back: Neck supple.      Comments: Back surgical incision with dressing C/D/I   Skin:     General: Skin is warm and dry.   Neurological:      Mental Status: She is alert and oriented to person,  place, and time.   Psychiatric:         Mood and Affect: Mood normal.         Behavior: Behavior is cooperative.           Significant Labs: All pertinent labs within the past 24 hours have been reviewed.  CBC:   Recent Labs   Lab 05/13/23  0453 05/14/23  0551   WBC 12.77* 11.88   HGB 8.3* 8.7*   HCT 27.6* 29.4*    359     CMP:   Recent Labs   Lab 05/13/23  0453      K 4.2      CO2 21*   *   BUN 21*   CREATININE 1.1   CALCIUM 9.3   PROT 6.8   ALBUMIN 2.8*   BILITOT 0.5   ALKPHOS 87   AST 30   ALT 42   ANIONGAP 11       Significant Imaging: I have reviewed all pertinent imaging results/findings within the past 24 hours.      Assessment/Plan:      * Acute metabolic encephalopathy  Patient found down at home, last well-known two days prior to ED presentation  History of CVA, lives at home with family and apparently able to participate in ADLs including babysitting her 6-year-old grandchild  CT head negative for acute intracranial pathology, however MRI brain revealed bilateral small acute infarcts consistent with embolic stroke  Also found to have back abscess on spine imaging  Metabolic encephalopathy noted on admission apparently multifactorial in etiology secondary to stroke and infectious process   Mentation improved, appears to be back to baseline    Elevated CPK  Elevation likely secondary to muscle breakdown as patient was found down at home for unknown period of time  Trended down with IV fluid    Cerebrovascular accident (CVA) due to embolism  Patient noted to have multiple areas of infarction on both hemispheres concerning for embolic phenomena  Carotid ultrasound showed no significant stenosis     Antithrombotics for secondary stroke prevention: Antiplatelets: Aspirin: 81 mg daily  Clopidogrel: 75 mg daily    Statins for secondary stroke prevention and hyperlipidemia, if present:   Statins: Atorvastatin- 80 mg daily    Aggressive risk factor modification: HTN, DM, Obesity     Rehab  efforts: The patient has been evaluated by a stroke team provider and the therapy needs have been fully considered based off the presenting complaints and exam findings. The following therapy evaluations are needed: PT evaluate and treat, OT evaluate and treat, SLP evaluate and treat    Diagnostics ordered/pending: MRA head to assess vasculature, TTE to assess cardiac function/status , TSH to assess thyroid function    VTE prophylaxis: Enoxaparin 40 mg SQ every 24 hours    BP parameters: Infarct: No intervention, SBP <220     Patient is past permissive hypertension period.  Optimize BP  Patient will need cardiac monitoring given embolic nature of stroke with no evidence of intracardiac shunting    PT/OT recommended rehab placement on discharge   SLP recommends dysphagia soft diet with thin liquid    Back abscess  Patient found to have drainage of purulence material from her back  CT of thoracic spine confirms abscess, MRI shows no spine involvement  General surgery consulted, recommended neurosurgery evaluation  No surgical intervention recommended  S/p I and D of 10 x 8 cm back abscess with Penrose drain placement by General surgery on 05/07/2023  Local wound care with daily wet-to-dry dressing changes  Finish 7 day course of clindamycin postop  Follow up with General surgery 2 weeks postop for Penrose drain removal      High anion gap metabolic acidosis  Resolved with IV fluids in setting of LUISA versus LUISA superimposed on CKD  Monitor      Elevated LFTs  , , .  Total bilirubin 1.3 on arrival  Acute hepatitis panel negative  Abdominal ultrasound shows no acute abnormality  LFTs downtrending, almost normal   Possibly fatty liver disease    Type 2 diabetes mellitus with hyperglycemia  Blood sugar mildly elevated 166 on admission  Hemoglobin A1c 7.1    Accu-Cheks with SSI  Diabetic diet  Optimize diabetic regimen in setting of acute embolic stroke  Patient admits that she has not been on any meds  outpatient for a couple of years, although she previously was on metformin  Restart metformin on discharge    Elevated troponin  Troponin elevated 0.25 on admission, in the setting of elevated creatinine 3.5.  Repeat troponin remained flat  EKG NSR with no ST or T-wave changes.    Likely demand ischemia in setting of infectious process and acute embolic stroke  Although patient initially encephalopathic, now that mentation has improved denies any chest pain    LUISA (acute kidney injury)  Patient with acute kidney injury likely due to IVVD/dehydration and acute tubular necrosis LUISA is currently improving. Labs reviewed- Renal function/electrolytes with Estimated Creatinine Clearance: 81.4 mL/min (based on SCr of 1.1 mg/dL). according to latest data. Monitor urine output and serial BMP and adjust therapy as needed. Avoid nephrotoxins and renally dose meds for GFR listed above.     creatinine elevated 3.5 on admission, downtrended with IV fluid administration   Nephrology consulted and have signed off   IV fluid discontinued given history of CHF noted on care everywhere and patient is able to tolerate oral intake, creatinine has been stable around 1  Baseline renal function unclear    Severe sepsis  This patient does have evidence of infective focus  My overall impression is sepsis.  Source: Skin and Soft Tissue (location Back)  Antibiotics given-   Antibiotics (72h ago, onward)    Start     Stop Route Frequency Ordered    05/12/23 0600  clindamycin capsule 300 mg         05/14 2359 Oral Every 6 hours 05/12/23 0221        Latest lactate reviewed-  No results for input(s): LACTATE in the last 72 hours.  Organ dysfunction indicated by Acute kidney injury and Acute liver injury    Fluid challenge Ideal Body Weight- The patient's ideal body weight is Ideal body weight: 54.7 kg (120 lb 9.5 oz) which will be used to calculate fluid bolus of 30 ml/kg for treatment of septic shock.      Post- resuscitation assessment No - Post  resuscitation assessment not needed       Will Not start Pressors- Levophed for MAP of 65  Source control achieved by:  IV antibiotics.      -afebrile.  HR .  WBC 94770, 0% bands, elevated procalcitonin on admission  -Leukocytosis has resolved  -possible multiple etiologies of infectious process secondary to UTI and back abscess  -blood cultures NGTD, urine culture with no growth  -Zosyn discontinued given urine culture showed no growth  -s/p I&D of back abscess on 05/07/2023  -transitioned to oral clindamycin, finish 7 day course post I&D    Morbid obesity with BMI of 45.0-49.9, adult  Body mass index is 46.13 kg/m². Morbid obesity complicates all aspects of disease management from diagnostic modalities to treatment. Weight loss encouraged and health benefits explained to patient.           VTE Risk Mitigation (From admission, onward)         Ordered     heparin (porcine) injection 5,000 Units  Every 12 hours         05/04/23 0456     IP VTE HIGH RISK PATIENT  Once         05/04/23 0456     Place sequential compression device  Until discontinued         05/04/23 0456     Place sequential compression device  Until discontinued         05/04/23 0043                Discharge Planning   CRISPIN:      Code Status: Full Code   Is the patient medically ready for discharge?:     Reason for patient still in hospital (select all that apply): Pending disposition  Discharge Plan A: Rehab   Discharge Delays: None known at this time    Patient is medically stable for discharge to rehab.  Has an accepting facility, awaiting insurance authorization          Heydi Cardozo DO  Department of Hospital Medicine   [facility] - Telemetry ([facility])

## 2023-05-14 NOTE — PT/OT/SLP PROGRESS
Speech Language Pathology Treatment    Patient Name:  Kenia Roper   MRN:  [id number]  Admitting Diagnosis: Acute metabolic encephalopathy    Recommendations:                 General Recommendations:  Cognitive-linguistic therapy  Diet recommendations:  Soft & Bite Sized Diet - IDDSI Level 6, Liquid Diet Level: Thin liquids - IDDSI Level 0   Aspiration Precautions: Standard aspiration precautions   General Precautions: Standard, fall  Communication strategies:  provide increased time to answer    Assessment:     Kenia Roper is a 47 y.o. female with an SLP diagnosis of Dysphagia and Cognitive-Linguistic Impairment.      Subjective     Patient alert, cooperative, and seen at bedside.  Patient goals: n/a     Pain/Comfort:  Pain Rating 1: 0/10  Pain Rating Post-Intervention 1: 0/10    Respiratory Status: Room air    Objective:     Has the patient been evaluated by SLP for swallowing?   Yes  Keep patient NPO? No   Current Respiratory Status:        Patient completed The [employer] Mental Status (UMS) examination for detecting mild cognitive impairment. She  performed a total score of 10 with deficits in memory recall, categorical naming, and executive functioning. She was unable to complete clock drawing due to weakness and writing difficulty. Results indicative of moderate-severe neurocognitive disorder.    Goals:   Multidisciplinary Problems       SLP Goals          Problem: SLP    Goal Priority Disciplines Outcome   SLP Goal     SLP Ongoing, Not Progressing   Description: 1.  Pt will consume the least restrictive PO diet with ongoing bedside assessment as mentation improves. MET 5/5/23--IDDSI 6/IDDSI 0 recommended.  1a. Pt will consume IDDSI 6/IDDSI 0 without incident.  1b. Pt will improve efficiency for diet advancement to regular solids (IDDSI 7).  2.  Pt will improve cognitive-linguistic skills to meet basic/functional communicative needs related to orientation, memory recall and  judgment/reasoning.                       Plan:     Patient to be seen:  2 x/week   Plan of Care expires:  05/11/23  Plan of Care reviewed with:  patient   SLP Follow-Up:  Yes       Discharge recommendations:  rehabilitation facility     Time Tracking:     SLP Treatment Date:   05/14/23  Speech Start Time:  1259  Speech Stop Time:  1317     Speech Total Time (min):  18 min    Billable Minutes: Speech Therapy Individual 18    05/14/2023

## 2023-05-14 NOTE — PLAN OF CARE
Problem: Adult Inpatient Plan of Care  Goal: Plan of Care Review  Outcome: Ongoing, Progressing  Goal: Patient-Specific Goal (Individualized)  Outcome: Ongoing, Progressing  Goal: Absence of Hospital-Acquired Illness or Injury  Outcome: Ongoing, Progressing  Goal: Optimal Comfort and Wellbeing  Outcome: Ongoing, Progressing  Goal: Readiness for Transition of Care  Outcome: Ongoing, Progressing     Problem: Bariatric Environmental Safety  Goal: Safety Maintained with Care  Outcome: Ongoing, Progressing     Problem: Adjustment to Illness (Stroke, Ischemic/Transient Ischemic Attack)  Goal: Optimal Coping  Outcome: Ongoing, Progressing     Problem: Bowel Elimination Impaired (Stroke, Ischemic/Transient Ischemic Attack)  Goal: Effective Bowel Elimination  Outcome: Ongoing, Progressing     Problem: Cerebral Tissue Perfusion (Stroke, Ischemic/Transient Ischemic Attack)  Goal: Optimal Cerebral Tissue Perfusion  Outcome: Ongoing, Progressing     Problem: Cognitive Impairment (Stroke, Ischemic/Transient Ischemic Attack)  Goal: Optimal Cognitive Function  Outcome: Ongoing, Progressing     Problem: Communication Impairment (Stroke, Ischemic/Transient Ischemic Attack)  Goal: Improved Communication Skills  Outcome: Ongoing, Progressing     Problem: Functional Ability Impaired (Stroke, Ischemic/Transient Ischemic Attack)  Goal: Optimal Functional Ability  Outcome: Ongoing, Progressing     Problem: Respiratory Compromise (Stroke, Ischemic/Transient Ischemic Attack)  Goal: Effective Oxygenation and Ventilation  Outcome: Ongoing, Progressing     Problem: Sensorimotor Impairment (Stroke, Ischemic/Transient Ischemic Attack)  Goal: Improved Sensorimotor Function  Outcome: Ongoing, Progressing     Problem: Swallowing Impairment (Stroke, Ischemic/Transient Ischemic Attack)  Goal: Optimal Eating and Swallowing without Aspiration  Outcome: Ongoing, Progressing     Problem: Urinary Elimination Impaired (Stroke, Ischemic/Transient Ischemic  Attack)  Goal: Effective Urinary Elimination  Outcome: Ongoing, Progressing     Problem: Adjustment to Illness (Sepsis/Septic Shock)  Goal: Optimal Coping  Outcome: Ongoing, Progressing     Problem: Bleeding (Sepsis/Septic Shock)  Goal: Absence of Bleeding  Outcome: Ongoing, Progressing     Problem: Glycemic Control Impaired (Sepsis/Septic Shock)  Goal: Blood Glucose Level Within Desired Range  Outcome: Ongoing, Progressing     Problem: Infection Progression (Sepsis/Septic Shock)  Goal: Absence of Infection Signs and Symptoms  Outcome: Ongoing, Progressing     Problem: Nutrition Impaired (Sepsis/Septic Shock)  Goal: Optimal Nutrition Intake  Outcome: Ongoing, Progressing     Problem: Fluid and Electrolyte Imbalance (Acute Kidney Injury/Impairment)  Goal: Fluid and Electrolyte Balance  Outcome: Ongoing, Progressing     Problem: Oral Intake Inadequate (Acute Kidney Injury/Impairment)  Goal: Optimal Nutrition Intake  Outcome: Ongoing, Progressing     Problem: Renal Function Impairment (Acute Kidney Injury/Impairment)  Goal: Effective Renal Function  Outcome: Ongoing, Progressing     Problem: Diabetes Comorbidity  Goal: Blood Glucose Level Within Targeted Range  Outcome: Ongoing, Progressing     Problem: Skin Injury Risk Increased  Goal: Skin Health and Integrity  Outcome: Ongoing, Progressing     Problem: Impaired Wound Healing  Goal: Optimal Wound Healing  Outcome: Ongoing, Progressing     Problem: Fall Injury Risk  Goal: Absence of Fall and Fall-Related Injury  Outcome: Ongoing, Progressing     Problem: Pain Acute  Goal: Acceptable Pain Control and Functional Ability  Outcome: Ongoing, Progressing     Problem: Infection  Goal: Absence of Infection Signs and Symptoms  Outcome: Ongoing, Progressing

## 2023-05-15 LAB
POCT GLUCOSE: 102 MG/DL (ref 70–110)
POCT GLUCOSE: 113 MG/DL (ref 70–110)
POCT GLUCOSE: 154 MG/DL (ref 70–110)
POCT GLUCOSE: 164 MG/DL (ref 70–110)
SARS-COV-2 RDRP RESP QL NAA+PROBE: NEGATIVE

## 2023-05-15 PROCEDURE — 25000003 PHARM REV CODE 250: Performed by: INTERNAL MEDICINE

## 2023-05-15 PROCEDURE — 97530 THERAPEUTIC ACTIVITIES: CPT

## 2023-05-15 PROCEDURE — 63600175 PHARM REV CODE 636 W HCPCS: Performed by: INTERNAL MEDICINE

## 2023-05-15 PROCEDURE — 21400001 HC TELEMETRY ROOM

## 2023-05-15 PROCEDURE — U0002 COVID-19 LAB TEST NON-CDC: HCPCS | Performed by: INTERNAL MEDICINE

## 2023-05-15 PROCEDURE — 25000003 PHARM REV CODE 250: Performed by: STUDENT IN AN ORGANIZED HEALTH CARE EDUCATION/TRAINING PROGRAM

## 2023-05-15 PROCEDURE — 97116 GAIT TRAINING THERAPY: CPT

## 2023-05-15 PROCEDURE — 97110 THERAPEUTIC EXERCISES: CPT

## 2023-05-15 RX ORDER — HYDROCHLOROTHIAZIDE 25 MG/1
25 TABLET ORAL DAILY
Start: 2023-05-16 | End: 2023-05-15 | Stop reason: SDUPTHER

## 2023-05-15 RX ORDER — LOSARTAN POTASSIUM 50 MG/1
50 TABLET ORAL DAILY
Qty: 90 TABLET | Refills: 0 | Status: SHIPPED | OUTPATIENT
Start: 2023-05-16 | End: 2023-07-31

## 2023-05-15 RX ORDER — HYDROCHLOROTHIAZIDE 25 MG/1
25 TABLET ORAL DAILY
Status: DISCONTINUED | OUTPATIENT
Start: 2023-05-15 | End: 2023-05-16 | Stop reason: HOSPADM

## 2023-05-15 RX ORDER — NAPROXEN SODIUM 220 MG/1
81 TABLET, FILM COATED ORAL DAILY
Refills: 0
Start: 2023-05-16 | End: 2023-05-15 | Stop reason: SDUPTHER

## 2023-05-15 RX ORDER — LANCETS
1 EACH MISCELLANEOUS 2 TIMES DAILY WITH MEALS
Qty: 100 EACH | Refills: 0 | Status: SHIPPED | OUTPATIENT
Start: 2023-05-15

## 2023-05-15 RX ORDER — AMLODIPINE BESYLATE 10 MG/1
10 TABLET ORAL DAILY
Start: 2023-05-16 | End: 2023-05-15 | Stop reason: SDUPTHER

## 2023-05-15 RX ORDER — NAPROXEN SODIUM 220 MG/1
81 TABLET, FILM COATED ORAL DAILY
Qty: 90 TABLET | Refills: 0 | Status: SHIPPED | OUTPATIENT
Start: 2023-05-16 | End: 2023-08-14

## 2023-05-15 RX ORDER — CLOPIDOGREL BISULFATE 75 MG/1
75 TABLET ORAL DAILY
Qty: 21 TABLET | Refills: 0
Start: 2023-05-16 | End: 2023-05-15 | Stop reason: SDUPTHER

## 2023-05-15 RX ORDER — ATORVASTATIN CALCIUM 80 MG/1
80 TABLET, FILM COATED ORAL NIGHTLY
Qty: 90 TABLET | Refills: 0 | Status: SHIPPED | OUTPATIENT
Start: 2023-05-15 | End: 2023-08-13

## 2023-05-15 RX ORDER — LOSARTAN POTASSIUM 50 MG/1
50 TABLET ORAL DAILY
Start: 2023-05-16 | End: 2023-05-15 | Stop reason: SDUPTHER

## 2023-05-15 RX ORDER — DEXTROSE 4 G
TABLET,CHEWABLE ORAL
Qty: 1 EACH | Refills: 0 | Status: SHIPPED | OUTPATIENT
Start: 2023-05-15

## 2023-05-15 RX ORDER — ATORVASTATIN CALCIUM 80 MG/1
80 TABLET, FILM COATED ORAL NIGHTLY
Start: 2023-05-15 | End: 2023-05-15 | Stop reason: SDUPTHER

## 2023-05-15 RX ORDER — HYDROCHLOROTHIAZIDE 25 MG/1
25 TABLET ORAL DAILY
Qty: 90 TABLET | Refills: 0 | Status: SHIPPED | OUTPATIENT
Start: 2023-05-16 | End: 2023-08-14

## 2023-05-15 RX ORDER — AMLODIPINE BESYLATE 10 MG/1
10 TABLET ORAL DAILY
Qty: 90 TABLET | Refills: 0 | Status: SHIPPED | OUTPATIENT
Start: 2023-05-16 | End: 2023-08-14

## 2023-05-15 RX ORDER — METFORMIN HYDROCHLORIDE 500 MG/1
500 TABLET ORAL 2 TIMES DAILY WITH MEALS
Qty: 180 TABLET | Refills: 0 | Status: SHIPPED | OUTPATIENT
Start: 2023-05-15 | End: 2023-08-13

## 2023-05-15 RX ORDER — LANCING DEVICE
1 EACH MISCELLANEOUS 2 TIMES DAILY WITH MEALS
Qty: 1 EACH | Refills: 0 | Status: SHIPPED | OUTPATIENT
Start: 2023-05-15 | End: 2024-05-14

## 2023-05-15 RX ORDER — CLOPIDOGREL BISULFATE 75 MG/1
75 TABLET ORAL DAILY
Qty: 21 TABLET | Refills: 0 | Status: SHIPPED | OUTPATIENT
Start: 2023-05-16 | End: 2023-08-02

## 2023-05-15 RX ORDER — METFORMIN HYDROCHLORIDE 500 MG/1
500 TABLET ORAL 2 TIMES DAILY WITH MEALS
Start: 2023-05-15 | End: 2023-05-15 | Stop reason: SDUPTHER

## 2023-05-15 RX ADMIN — LOSARTAN POTASSIUM 50 MG: 50 TABLET, FILM COATED ORAL at 08:05

## 2023-05-15 RX ADMIN — ASPIRIN 81 MG CHEWABLE TABLET 81 MG: 81 TABLET CHEWABLE at 08:05

## 2023-05-15 RX ADMIN — HEPARIN SODIUM 5000 UNITS: 5000 INJECTION INTRAVENOUS; SUBCUTANEOUS at 08:05

## 2023-05-15 RX ADMIN — CLOPIDOGREL BISULFATE 75 MG: 75 TABLET ORAL at 08:05

## 2023-05-15 RX ADMIN — AMLODIPINE BESYLATE 10 MG: 10 TABLET ORAL at 08:05

## 2023-05-15 RX ADMIN — ACETAMINOPHEN 650 MG: 325 TABLET ORAL at 08:05

## 2023-05-15 RX ADMIN — HYDROCHLOROTHIAZIDE 25 MG: 25 TABLET ORAL at 08:05

## 2023-05-15 RX ADMIN — ATORVASTATIN CALCIUM 80 MG: 40 TABLET, FILM COATED ORAL at 08:05

## 2023-05-15 NOTE — SUBJECTIVE & OBJECTIVE
Interval History: No acute events overnight.  Seen and examined with daughter and grandson present in room.  Initially was agreeable with plan to discharge to rehab today.  However shortly after she told nursing staff that she no longer wants to go to rehab.  Despite discussions by multiple staff members including myself, patient remains determined to go home.  States that there is some legal issues regarding her grandson which requires her to be able to be at home.  She feels comfortable about going home with home health and a walker, states that she will call and set up medical transport and will follow up with her medical appointments.  Discussed with SW, attempting to set up home health for patient.    Review of Systems  Objective:     Vital Signs (Most Recent):  Temp: 98.7 °F (37.1 °C) (05/15/23 1520)  Pulse: 83 (05/15/23 1520)  Resp: 18 (05/15/23 1520)  BP: 137/75 (05/15/23 1520)  SpO2: 98 % (05/15/23 1520) Vital Signs (24h Range):  Temp:  [96 °F (35.6 °C)-98.7 °F (37.1 °C)] 98.7 °F (37.1 °C)  Pulse:  [76-88] 83  Resp:  [16-20] 18  SpO2:  [96 %-100 %] 98 %  BP: (137-182)/(61-97) 137/75     Weight: 121.9 kg (268 lb 11.9 oz)  Body mass index is 46.13 kg/m².    Intake/Output Summary (Last 24 hours) at 5/15/2023 1651  Last data filed at 5/15/2023 1342  Gross per 24 hour   Intake 480 ml   Output --   Net 480 ml         Physical Exam  Vitals and nursing note reviewed.   Constitutional:       General: She is not in acute distress.     Appearance: She is morbidly obese.   HENT:      Head: Normocephalic and atraumatic.      Right Ear: External ear normal.      Left Ear: External ear normal.      Nose: Nose normal.      Mouth/Throat:      Mouth: Mucous membranes are moist.   Eyes:      Pupils: Pupils are equal, round, and reactive to light.   Cardiovascular:      Rate and Rhythm: Regular rhythm.   Pulmonary:      Effort: Pulmonary effort is normal. No accessory muscle usage or respiratory distress.      Breath sounds:  No wheezing.   Abdominal:      General: Bowel sounds are normal. There is no distension.      Palpations: Abdomen is soft.      Tenderness: There is no abdominal tenderness. There is no guarding or rebound.   Musculoskeletal:      Cervical back: Neck supple.   Skin:     General: Skin is warm and dry.   Neurological:      Mental Status: She is alert. Mental status is at baseline.           Significant Labs: All pertinent labs within the past 24 hours have been reviewed.    Significant Imaging: I have reviewed all pertinent imaging results/findings within the past 24 hours.

## 2023-05-15 NOTE — PLAN OF CARE
"SW received message from nurse that patient stated she no longer wanted to go to Rehab, she wanted to go home. SW messaged Attending and therapy for recommendations and progress in Patient. Per Occupational Therapy, patient did do better today but would benefit from some rehab. Per therapy, Patient walked 40ft x2 trials with RW and CGA. SW went and talked to Attending who stated she was not medically stable to go home.   SW went and spoke to Patient at bedside. Patient stated that she did not need rehab anymore, she just wished to go home. Patient stated she has an "issue with her grandson, where she needed to be home". SW inquired more about the "issue". Patient disclosed that it was a legal issue. SW stated she would let attending know and would possibly be back to discuss going since Patient is not medically stable to Dc home.    SW will continue to follow and assist as needed.   "

## 2023-05-15 NOTE — PT/OT/SLP PROGRESS
"Occupational Therapy   Treatment    Name: Kenia Roper  MRN: [id number]  Admitting Diagnosis:  Cerebrovascular accident (CVA) due to embolism  8 Days Post-Op    Recommendations:     Discharge Recommendations: rehabilitation facility  Discharge Equipment Recommendations:  to be determined by next level of care  Barriers to discharge:  None    Assessment:     Kenia Roper is a 47 y.o. female with a medical diagnosis of Cerebrovascular accident (CVA) due to embolism.  She presents with the following performance deficits affecting function are weakness, impaired endurance, impaired self care skills, impaired functional mobility, impaired balance, pain, impaired cardiopulmonary response to activity, impaired skin, decreased safety awareness, impaired fine motor.     Rehab Prognosis:  Good; patient would benefit from acute skilled OT services to address these deficits and reach maximum level of function.       Plan:     Patient to be seen 2 x/week to address the above listed problems via self-care/home management, therapeutic activities, therapeutic exercises  Plan of Care Expires: 05/18/23  Plan of Care Reviewed with: patient    Subjective     Chief Complaint: Reported "I am doing better."  Patient/Family Comments/goals: none reported  Pain/Comfort:  Pain Rating 1: 6/10  Location 1: back  Pain Addressed 1:  (activity pacing)  Pain Rating Post-Intervention 1: 5/10    Objective:     Communicated with: Fior Coulter, prior to session.  Patient found supine with peripheral IV, PICC line, telemetry upon OT entry to room.    General Precautions: Standard, fall    Orthopedic Precautions:N/A  Braces: N/A  Respiratory Status: Room air     Occupational Performance:     Bed Mobility:    Patient completed Supine to Sit with minimum assistance     Functional Mobility/Transfers:  Patient completed Sit <> Stand Transfer with minimum assistance  with  rolling walker   Patient completed Bed <> Chair Transfer using Step Transfer " technique with contact guard assistance with rolling walker  Functional Mobility: Patient completed x40ft x2 trials functional mobility with RW and CGA to increase dynamic standing balance and activity tolerance needed for ADL completion.    [facility] 6 Click ADL: 17    Treatment & Education:  Patient tolerated intervention well overall. Provided with v/c for technique to increase safety and independence with completion. Completed B UE red tband therex while seated in bedside chair x10 reps, x3 planes of motion to increase functional strength and activity tolerance needed for ADL completion. Provided with verbal cueing for technique and pacing throughout. Patient educated on benefits of OOB activity and need to call for A to transfer back to bed.    Patient left up in chair with all lines intact, call button in reach, and chair alarm off    GOALS:   Multidisciplinary Problems       Occupational Therapy Goals          Problem: Occupational Therapy    Goal Priority Disciplines Outcome Interventions   Occupational Therapy Goal     OT, PT/OT Ongoing, Progressing    Description: O.T. GOALS TO BE MET BY 5-18-23  PT WILL TOLERATE 1 SET X 15 REPS B UE AA/AROM EXERCISE  SBA WITH UE DRESSING  SPV WITH T/FS TO CHAIR.  SETUP ASSIST WITH TOILETING.                               Time Tracking:     OT Date of Treatment: 05/15/23  OT Start Time: 0900  OT Stop Time: 0925  OT Total Time (min): 25 min    Billable Minutes:Therapeutic Activity 10  Therapeutic Exercise 15    5/15/2023

## 2023-05-15 NOTE — CONSULTS
[facility] - Telemetry ([facility])  Wound Care    Patient Name:  Kenia Roper   MRN:  [id number]  Date: 5/15/2023  Diagnosis: Cerebrovascular accident (CVA) due to embolism    History:     Past Medical History:   Diagnosis Date    Diabetes mellitus     Ovarian cancer     Stroke        Social History     Socioeconomic History    Marital status: Unknown   Tobacco Use    Smoking status: Unknown       Precautions:     Allergies as of 05/03/2023    (No Known Allergies)       [facility] Assessment Details/Treatment     New consult noted on patient due to newly noted wound to right lateral lower back/hip region.   Dressing changed to thoracic spine I&D site x2 with penrose drain connection. Cleansed with vashe, filled with vashe wetted gauze, and covered with abd pad. Tolerated care well. Needs daily wound care from  on discharge.  Right lateral lower back/hip region with small wound noted, partial thickness tissue loss, that is MARSI (medical adhesive related skin injury) due to tape removal from prior dressing to thoracic spine. Measures 1x4x0.1cm, moist pink dermal wound bed with epithelial tissue in center. Cleansed with saline and patted dry. Foam dressing applied.     05/15/23 1217        Incision/Site 05/07/23 1148 Back   Date First Assessed/Time First Assessed: 05/07/23 1148   Location: Back   Incision WDL ex   Dressing Appearance Intact   Drainage Amount Small   Drainage Characteristics/Odor Serosanguineous   Appearance Red;Yellow;Moist   Care Cleansed with:;Wound cleanser;Applied:;Skin Barrier   Dressing Gauze, wet to moist;Absorptive Pad   Packing packing removed;packed with;strip gauze   Packing Inserted  1   Packing Removed 1   Dressing Change Due 05/22/23        Altered Skin Integrity 05/13/23 0750 Right lateral;lower Back Medical adhesive related skin injury   Date First Assessed/Time First Assessed: 05/13/23 0750   Altered Skin Integrity Present on Admission - Did Patient arrive to the hospital with altered skin?:  yes  Side: Right  Orientation: lateral;lower  Location: Back  Primary Wound Type: Medical adh...   Wound Image    Description of Altered Skin Integrity   (not pressure related skin injury)   Dressing Appearance Intact   Drainage Amount Scant   Drainage Characteristics/Odor Serous   Appearance Pink;Moist   Tissue loss description Partial thickness   Periwound Area Intact   Wound Length (cm) 1 cm   Wound Width (cm) 4 cm   Wound Depth (cm) 0.1 cm   Wound Volume (cm^3) 0.4 cm^3   Wound Surface Area (cm^2) 4 cm^2   Care Cleansed with:;Sterile normal saline;Applied:;Skin Barrier   Dressing Foam;Applied       05/15/2023

## 2023-05-15 NOTE — PLAN OF CARE
Aox4. Able to verbalize needs & follow commands. Calm and cooperative throughout shift.   POC reviewed with pt. Interventions implemented as appropriate.    VS stable; SR on tele-monitor.  On RA.      Midline catheter to LUE. Saline locked. Integrity maintained.  Mechanical soft, cardiac diet. Faraz BID.   Wound to lower back. Pinrose drain in place. Four eyes on skin w/ MARKO Barriga.  No c/o pain.    Continent of b/b. Up to bsc.    CBG ac/hs.  Plavix for VTE.  Ambulatory. Activity ad maryjane. Frequent position changes encouraged. Able to reposition in bed independently.  Educated on s/sx of pressure injury;  verbalized understanding.  NADN. Resting quietly in bed.   Free of falls. Hourly rounding complete.   All safety measures remain in place. SR up x2; bed low & locked. Call light w/in reach.   Will continue to monitor throughout shift.  Chart check complete.

## 2023-05-15 NOTE — ASSESSMENT & PLAN NOTE
This patient does have evidence of infective focus  My overall impression is sepsis.  Source: Skin and Soft Tissue (location Back)  Antibiotics given-   Antibiotics (72h ago, onward)    None        Latest lactate reviewed-  No results for input(s): LACTATE in the last 72 hours.  Organ dysfunction indicated by Acute kidney injury and Acute liver injury    Fluid challenge Ideal Body Weight- The patient's ideal body weight is Ideal body weight: 54.7 kg (120 lb 9.5 oz) which will be used to calculate fluid bolus of 30 ml/kg for treatment of septic shock.      Post- resuscitation assessment No - Post resuscitation assessment not needed       Will Not start Pressors- Levophed for MAP of 65  Source control achieved by:  IV antibiotics.      -afebrile.  HR .  WBC 54368, 0% bands, elevated procalcitonin on admission  -Leukocytosis has resolved  -possible multiple etiologies of infectious process secondary to UTI and back abscess  -blood cultures NGTD, urine culture with no growth  -Zosyn discontinued given urine culture showed no growth  -s/p I&D of back abscess on 05/07/2023  -transitioned to oral clindamycin, finished 7 day course post I&D

## 2023-05-15 NOTE — PROGRESS NOTES
[facility] Medicine  Progress Note    Patient Name: Kenia Roper  MRN: [id number]  Patient Class: IP- Inpatient   Admission Date: 5/3/2023  Length of Stay: 11 days  Attending Physician: Heydi Cardozo DO  Primary Care Provider: No primary care provider on file.        Subjective:     Principal Problem:Cerebrovascular accident (CVA) due to embolism        HPI:  Ms. Roper is a 57-year-old morbidly obese  female, was brought in to the ED in an unresponsive state.  No family at the bedside, unable to obtain any information from patient.  No information per Care everywhere.  Most of the information obtained from ED staff.  Per report, patient was found down at home, covered in feces and urine.  Last known well two days ago.  No report of fever or chills.  Known history of CVA and type 2 diabetes mellitus.  In the ED she is afebrile, mildly tachycardic HR .  WBC elevated 20,000. BUN 60, creatinine 3.5 (unknown baseline).  Elevated LFTs in the 400s.  Troponin elevated 0.259, unknown if she has chest pain or SOB.  EKG NSR with no ST T wave changes.  Hemodynamically stable.  Presumed severe sepsis with LUISA/AMS, she received 30 mL/kilogram bolus in the ED along with IV Zosyn empirically.  Cultures obtained.  UDS positive for opiates.  CT head is negative for acute intracranial pathology.  CXR negative for infectious process.    Admitting diagnosis:  Severe sepsis.  Acute encephalopathy (TIA versus uremia).  LUISA.  Elevated troponin.  Elevated LFTs.      Overview/Hospital Course:  MRI brain revealed bilateral small scattered acute infarcts favoring embolic type infarcts.  Neurology consulted    Upon further examination, patient was found to have a large middle back abscess with purulent drainage.  CT of thoracic spine done without contrast showed subcutaneous on organized air-fluid collection dorsally consistent with clinical abscess or gas producing infection with no overt  underlying bony change or involvement of the spinal canal.  Patient started on empiric antibiotics with Zosyn and clindamycin.  General surgery consulted and recommended MRI of thoracic spine and Neurosurgery consultation.  MRI of thoracic spine showed no overt bony marrow edema and no overt compromise of the thoracic canal, subcutaneous abscess or phlegmon with cutaneous fistula with no overt extension into the spinal canal.    Neurosurgery recommends no neurosurgical intervention as there is no evidence of cord compromise.  General surgery performed I&D of back abscess on 05/07/2023.  Blood cultures with no growth.  Per General surgery, patient will require daily wet-to-dry dressing changes with Kerlix gauze, keep Penrose in place.  Patient will need to follow up surgery Clinic 2 weeks postop at which time Penrose will be removed    Echocardiogram showed LVEF 55%, normal left ventricular diastolic function, no evidence of intracardiac shunting.  She will need cardiac monitoring to rule out AFib Given embolic nature of stroke    Mentation improved, patient awake, alert, oriented although notable to have lower extremity weakness and difficulty with word finding.  Neurology consulted and recommended MRA brain which showed degraded image, scattered bilateral foci acute ischemia supratentorial similar to MRI brain, apparent relative diminished flow of left MCA which may be partially occlusive, otherwise no large vessel occlusion or critical stenosis noted.    THERAPISTS' RECS  --SLP: Soft & Bite Sized Diet - IDDSI Level 6, Liquid Diet Level: Thin liquids - IDDSI Level 0   --PT: ambulated 3 ft min A with RW  --rehab still recommended for post acute therapy    Initially patient decided to refuse rehab placement, however after extensive discussion with patient regarding her need for significant assistance at this time and lack of support at home as patient does not have any family other than her daughter and grandson who  can help her at home.  Case management working on placement for discharge.  Accepted at [facility], awaiting insurance authorization.    Patient will need referral to establish with a PCP after discharge as she states she does not have a PCP    Patient was planned for discharge to rehab on 05/15/2023 after insurance authorization was received.  However she decided that she does not want to go to rehab after all, as she states that she needs to go home to help her daughter and grandson as there is a legal issue which require her involvement.  Risks of going home versus the benefits of going to rehab discussed with patient extensively.  However she is adamant that she does not want to go rehab and feels comfortable about going home with home health if she can get a walker.    High-risk for readmission      Interval History: No acute events overnight.  Seen and examined with daughter and grandson present in room.  Initially was agreeable with plan to discharge to rehab today.  However shortly after she told nursing staff that she no longer wants to go to rehab.  Despite discussions by multiple staff members including myself, patient remains determined to go home.  States that there is some legal issues regarding her grandson which requires her to be able to be at home.  She feels comfortable about going home with home health and a walker, states that she will call and set up medical transport and will follow up with her medical appointments.  Discussed with SW, attempting to set up home health for patient.    Review of Systems  Objective:     Vital Signs (Most Recent):  Temp: 98.7 °F (37.1 °C) (05/15/23 1520)  Pulse: 83 (05/15/23 1520)  Resp: 18 (05/15/23 1520)  BP: 137/75 (05/15/23 1520)  SpO2: 98 % (05/15/23 1520) Vital Signs (24h Range):  Temp:  [96 °F (35.6 °C)-98.7 °F (37.1 °C)] 98.7 °F (37.1 °C)  Pulse:  [76-88] 83  Resp:  [16-20] 18  SpO2:  [96 %-100 %] 98 %  BP: (137-182)/(61-97) 137/75     Weight: 121.9 kg  (268 lb 11.9 oz)  Body mass index is 46.13 kg/m².    Intake/Output Summary (Last 24 hours) at 5/15/2023 1651  Last data filed at 5/15/2023 1342  Gross per 24 hour   Intake 480 ml   Output --   Net 480 ml         Physical Exam  Vitals and nursing note reviewed.   Constitutional:       General: She is not in acute distress.     Appearance: She is morbidly obese.   HENT:      Head: Normocephalic and atraumatic.      Right Ear: External ear normal.      Left Ear: External ear normal.      Nose: Nose normal.      Mouth/Throat:      Mouth: Mucous membranes are moist.   Eyes:      Pupils: Pupils are equal, round, and reactive to light.   Cardiovascular:      Rate and Rhythm: Regular rhythm.   Pulmonary:      Effort: Pulmonary effort is normal. No accessory muscle usage or respiratory distress.      Breath sounds: No wheezing.   Abdominal:      General: Bowel sounds are normal. There is no distension.      Palpations: Abdomen is soft.      Tenderness: There is no abdominal tenderness. There is no guarding or rebound.   Musculoskeletal:      Cervical back: Neck supple.   Skin:     General: Skin is warm and dry.   Neurological:      Mental Status: She is alert. Mental status is at baseline.           Significant Labs: All pertinent labs within the past 24 hours have been reviewed.    Significant Imaging: I have reviewed all pertinent imaging results/findings within the past 24 hours.      Assessment/Plan:      * Cerebrovascular accident (CVA) due to embolism  Patient noted to have multiple areas of infarction on both hemispheres concerning for embolic phenomena  Carotid ultrasound showed no significant stenosis     Antithrombotics for secondary stroke prevention: Antiplatelets: Aspirin: 81 mg daily  Clopidogrel: 75 mg daily    Statins for secondary stroke prevention and hyperlipidemia, if present:   Statins: Atorvastatin- 80 mg daily    Aggressive risk factor modification: HTN, DM, Obesity     Rehab efforts: The patient has  been evaluated by a stroke team provider and the therapy needs have been fully considered based off the presenting complaints and exam findings. The following therapy evaluations are needed: PT evaluate and treat, OT evaluate and treat, SLP evaluate and treat    Diagnostics ordered/pending: MRA head to assess vasculature, TTE to assess cardiac function/status , TSH to assess thyroid function    VTE prophylaxis: Enoxaparin 40 mg SQ every 24 hours    BP parameters: Infarct: No intervention, SBP <220     Patient is past permissive hypertension period.  Optimize BP  Patient will need cardiac monitoring given embolic nature of stroke with no evidence of intracardiac shunting.  Referred for cardiology for cardiac monitoring    PT/OT recommended rehab placement on discharge   SLP recommends dysphagia soft diet with thin liquid    Patient accepted at [facility] and received insurance authorization, however has ultimately decided to refuse rehab placement  We will attempt to set up home health for patient    Elevated CPK  Elevation likely secondary to muscle breakdown as patient was found down at home for unknown period of time  Trended down with IV fluid    Back abscess  Patient found to have drainage of purulence material from her back  CT of thoracic spine confirms abscess, MRI shows no spine involvement  General surgery consulted, recommended neurosurgery evaluation  No surgical intervention recommended  S/p I and D of 10 x 8 cm back abscess with Penrose drain placement by General surgery on 05/07/2023  Local wound care with daily wet-to-dry dressing changes  Finished 7 day course of clindamycin postop  Follow up with General surgery, appointment made for 5/29      High anion gap metabolic acidosis  Resolved with IV fluids in setting of LUISA versus LUISA superimposed on CKD  Monitor      Elevated LFTs  , , .  Total bilirubin 1.3 on arrival  Acute hepatitis panel negative  Abdominal ultrasound shows  no acute abnormality  LFTs downtrending, almost normal   Possibly fatty liver disease    Type 2 diabetes mellitus with hyperglycemia  Blood sugar mildly elevated 166 on admission  Hemoglobin A1c 7.1    Accu-Cheks with SSI  Diabetic diet  Optimize diabetic regimen in setting of acute embolic stroke  Patient admits that she has not been on any meds outpatient for a couple of years, although she previously was on metformin  Restart metformin on discharge    Elevated troponin  Troponin elevated 0.25 on admission, in the setting of elevated creatinine 3.5.  Repeat troponin remained flat  EKG NSR with no ST or T-wave changes.    Likely demand ischemia in setting of infectious process and acute embolic stroke  Although patient initially encephalopathic, now that mentation has improved denies any chest pain    Acute metabolic encephalopathy  Patient found down at home, last well-known two days prior to ED presentation  History of CVA, lives at home with family and apparently able to participate in ADLs including babysitting her 6-year-old grandchild  CT head negative for acute intracranial pathology, however MRI brain revealed bilateral small acute infarcts consistent with embolic stroke  Also found to have back abscess on spine imaging  Metabolic encephalopathy noted on admission apparently multifactorial in etiology secondary to stroke and infectious process   Mentation improved, appears to be back to baseline    LUISA (acute kidney injury)  Patient with acute kidney injury likely due to IVVD/dehydration and acute tubular necrosis LUISA is currently improving. Labs reviewed- Renal function/electrolytes with Estimated Creatinine Clearance: 81.4 mL/min (based on SCr of 1.1 mg/dL). according to latest data. Monitor urine output and serial BMP and adjust therapy as needed. Avoid nephrotoxins and renally dose meds for GFR listed above.     creatinine elevated 3.5 on admission, downtrended with IV fluid administration   Nephrology  consulted and have signed off   IV fluid discontinued given history of CHF noted on care everywhere and patient is able to tolerate oral intake, creatinine has been stable around 1  Baseline renal function unclear    Severe sepsis  This patient does have evidence of infective focus  My overall impression is sepsis.  Source: Skin and Soft Tissue (location Back)  Antibiotics given-   Antibiotics (72h ago, onward)    None        Latest lactate reviewed-  No results for input(s): LACTATE in the last 72 hours.  Organ dysfunction indicated by Acute kidney injury and Acute liver injury    Fluid challenge Ideal Body Weight- The patient's ideal body weight is Ideal body weight: 54.7 kg (120 lb 9.5 oz) which will be used to calculate fluid bolus of 30 ml/kg for treatment of septic shock.      Post- resuscitation assessment No - Post resuscitation assessment not needed       Will Not start Pressors- Levophed for MAP of 65  Source control achieved by:  IV antibiotics.      -afebrile.  HR .  WBC 64701, 0% bands, elevated procalcitonin on admission  -Leukocytosis has resolved  -possible multiple etiologies of infectious process secondary to UTI and back abscess  -blood cultures NGTD, urine culture with no growth  -Zosyn discontinued given urine culture showed no growth  -s/p I&D of back abscess on 05/07/2023  -transitioned to oral clindamycin, finished 7 day course post I&D    Morbid obesity with BMI of 45.0-49.9, adult  Body mass index is 46.13 kg/m². Morbid obesity complicates all aspects of disease management from diagnostic modalities to treatment. Weight loss encouraged and health benefits explained to patient.         VTE Risk Mitigation (From admission, onward)         Ordered     heparin (porcine) injection 5,000 Units  Every 12 hours         05/04/23 0456     IP VTE HIGH RISK PATIENT  Once         05/04/23 0456     Place sequential compression device  Until discontinued         05/04/23 0456     Place sequential  compression device  Until discontinued         05/04/23 0043                Discharge Planning   CRISPIN: 5/15/2023     Code Status: Full Code   Is the patient medically ready for discharge?:     Reason for patient still in hospital (select all that apply): Pending disposition  Discharge Plan A: Home Health   Discharge Delays: None known at this time    Working on setting up home health for patient as she is adamant that she needs to go home and cannot go to rehab as her family needs her.  Planned for discharge home tomorrow.  If unable to find an accepting home health company, likely she will end up going home with outpatient therapy          Heydi Cardozo DO  Department of Hospital Medicine   [facility] - Telemetry ([facility])

## 2023-05-15 NOTE — PT/OT/SLP PROGRESS
Physical Therapy Treatment    Patient Name:  Kenia Roper   MRN:  [id number]    Recommendations:     Discharge Recommendations: rehabilitation facility  Discharge Equipment Recommendations: to be determined by next level of care  Barriers to discharge: None    Assessment:     Kenia Roper is a 47 y.o. female admitted with a medical diagnosis of Cerebrovascular accident (CVA) due to embolism.  She presents with the following impairments/functional limitations: weakness, impaired endurance, impaired functional mobility, gait instability, impaired balance, pain, decreased safety awareness, decreased lower extremity function, decreased coordination.    Rehab Prognosis: Good; patient would benefit from acute skilled PT services to address these deficits and reach maximum level of function.    Recent Surgery: Procedure(s) (LRB):  INCISION AND DRAINAGE, ABSCESS (N/A) 8 Days Post-Op    Plan:     During this hospitalization, patient to be seen 3 x/week to address the identified rehab impairments via gait training, therapeutic exercises, therapeutic activities and progress toward the following goals:    Plan of Care Expires:  05/18/23    Subjective     Chief Complaint: NONE, EAGER TO WALK  Patient/Family Comments/goals:   Pain/Comfort:  Pain Rating 1: 6/10  Location - Orientation 1: lower  Location 1: back  Pain Addressed 1: Reposition, Distraction      Objective:     Communicated with NURSE QUEEN prior to session.  Patient found supine with telemetry, peripheral IV, PICC line upon PT entry to room.     General Precautions: Standard, fall  Orthopedic Precautions: N/A  Braces: N/A  Respiratory Status: Room air     Functional Mobility:  Bed Mobility:     Rolling Left:  minimum assistance  Scooting: minimum assistance  Supine to Sit: minimum assistance-HOB ELEVATED  Transfers:     Sit to Stand:  minimum assistance with rolling walker  Bed to Chair: minimum assistance with  rolling walker  using  Step Transfer  Gait: PT AMB  "50' X 2 TRIALS WITH RW AND MEHRAN, CUES FOR UPRIGHT POSTURE AND TAKING LARGER STEPS, 3 SMALL STANDING REST BREAKS, NO GROSS LOB, GOOD EFFORT  Balance: FAIR SITTING BALANCE, POOR+ DYNAMIC BALANCE DURING GAIT    AM-PAC 6 CLICK MOBILITY  Turning over in bed (including adjusting bedclothes, sheets and blankets)?: 3  Sitting down on and standing up from a chair with arms (e.g., wheelchair, bedside commode, etc.): 3  Moving from lying on back to sitting on the side of the bed?: 3  Moving to and from a bed to a chair (including a wheelchair)?: 3  Need to walk in hospital room?: 3  Climbing 3-5 steps with a railing?: 1  Basic Mobility Total Score: 16     Treatment & Education:  REVIEW ROLE OF P.T. AND POC IN ACUTE CARE HOSPITAL SETTING, REVIEW RW USE AND SAFETY DURING TF'S AND GAIT, ENCOURAGED TO INCREASE TIME OOB IN CHAIR TO TOLERANCE, EDUCATED IN AND ENCOURAGED TO PERFORM BLE THEREX WHILE SEATED OR SUPINE THROUGHOUT THE DAY TO TOLERANCE: HIP FLEX/EXT, QUAD SET, LAQ, HEEL SLIDES, AP'S.  PT AGREEABLE TO REQUESTS  PT EDUCATED ON RISK FOR FALLS DUE TO GENERALIZED WEAKNESS, EDUCATED ON "CALL DON'T FALL", ENCOURAGED TO CALL FOR ASSISTANCE WITH ALL NEEDS SUCH AS BED<>CHAIR TRANSFERS OR TRIPS TO BATHROOM, PT AGREEABLE TO SAFETY PRECAUTIONS    Patient left up in chair with all lines intact, call button in reach, chair alarm on, and NURSE notified..    GOALS:   Multidisciplinary Problems       Physical Therapy Goals          Problem: Physical Therapy    Goal Priority Disciplines Outcome Goal Variances Interventions   Physical Therapy Goal     PT, PT/OT Ongoing, Progressing     Description: Pt will perform bed mobility SBA in order to participate in EOB activity.  Pt will perform transfers CGA in order to participate in OOB activity.   Pt will ambulate 50ft CGA with LRAD in order to participate in daily tasks.                         Time Tracking:     PT Received On: 05/15/23  PT Start Time: 0910     PT Stop Time: 0935  PT Total Time " (min): 25 min     Billable Minutes: Gait Training 10 and Therapeutic Activity 15    Treatment Type: Treatment  PT/PTA: PT     Number of PTA visits since last PT visit: 0     05/15/2023

## 2023-05-15 NOTE — ASSESSMENT & PLAN NOTE
Patient found to have drainage of purulence material from her back  CT of thoracic spine confirms abscess, MRI shows no spine involvement  General surgery consulted, recommended neurosurgery evaluation  No surgical intervention recommended  S/p I and D of 10 x 8 cm back abscess with Penrose drain placement by General surgery on 05/07/2023  Local wound care with daily wet-to-dry dressing changes  Finished 7 day course of clindamycin postop  Follow up with General surgery, appointment made for 5/29

## 2023-05-15 NOTE — ASSESSMENT & PLAN NOTE
Patient noted to have multiple areas of infarction on both hemispheres concerning for embolic phenomena  Carotid ultrasound showed no significant stenosis     Antithrombotics for secondary stroke prevention: Antiplatelets: Aspirin: 81 mg daily  Clopidogrel: 75 mg daily    Statins for secondary stroke prevention and hyperlipidemia, if present:   Statins: Atorvastatin- 80 mg daily    Aggressive risk factor modification: HTN, DM, Obesity     Rehab efforts: The patient has been evaluated by a stroke team provider and the therapy needs have been fully considered based off the presenting complaints and exam findings. The following therapy evaluations are needed: PT evaluate and treat, OT evaluate and treat, SLP evaluate and treat    Diagnostics ordered/pending: MRA head to assess vasculature, TTE to assess cardiac function/status , TSH to assess thyroid function    VTE prophylaxis: Enoxaparin 40 mg SQ every 24 hours    BP parameters: Infarct: No intervention, SBP <220     Patient is past permissive hypertension period.  Optimize BP  Patient will need cardiac monitoring given embolic nature of stroke with no evidence of intracardiac shunting.  Referred for cardiology for cardiac monitoring    PT/OT recommended rehab placement on discharge   SLP recommends dysphagia soft diet with thin liquid    Patient accepted at [facility] and received insurance authorization, however has ultimately decided to refuse rehab placement  We will attempt to set up home health for patient

## 2023-05-15 NOTE — CONSULTS
FELIX following up on Patient's wishes to go home. Per Patient's attending doctor, Patient is not medically stable to discharge home, however patient is insisting that she go home to be with grandson. Per Attending, Patient stated that her grandson is at risk for being removed from Patient's daughter and she wishes to go home to be support for daughter and grandson. Patient refused safe DC plan of rehab facility and will DC home with home health services. FELIX sent referral and orders to [facility] for possible set up upon DC tomorrow.    SW received a call from Patient's [facility]  giving updated plan for discharge. Patient's case worke inquired about how a Home Health Agency would be set up for patient. SW let DCFS  know that SW would set up Home Health before discharged. [facility]  thanked FELIX and verbalized she did understand DC plan and that she would let the team on her end know the progress and DC plan.

## 2023-05-15 NOTE — PLAN OF CARE
05/15/23 1530   Post-Acute Status   Post-Acute Authorization Home Health   Home Health Status Set-up Complete/Auth obtained   Coverage [facility] Medicaid   Discharge Delays None known at this time   Discharge Plan   Discharge Plan A Home Health       SW sent referral to [facility] for services. Maya with [facility] called asking if Patient had help at home to do daily dressing changes, as Home Health would not be out daily for dressing changes. SW informed Maya that patient does live with daughter, but daughter is not the most reliable. Maya stated they would accept patient for services and would teach daughter how to do dressing changes.

## 2023-05-15 NOTE — PLAN OF CARE
FELIX called Julienne, with [facility], to get status on insurance authorization. Julienne did not answer, FELIX was able to leave voicemail with call back number.     FELIX received call back from Julienne at [facility]. Per Julienne, [facility] has insurance authorization and can accept Patient. Julienne provided number for nurse report and fax number for Covid Test and DC information.  FELIX will gather documentation and fax information.     FELIX will continue to follow and assist as needed.

## 2023-05-16 ENCOUNTER — PATIENT OUTREACH (OUTPATIENT)
Dept: ADMINISTRATIVE | Facility: CLINIC | Age: 48
End: 2023-05-16
Payer: MEDICAID

## 2023-05-16 VITALS
TEMPERATURE: 100 F | HEART RATE: 88 BPM | HEIGHT: 64 IN | BODY MASS INDEX: 45.88 KG/M2 | OXYGEN SATURATION: 99 % | DIASTOLIC BLOOD PRESSURE: 79 MMHG | SYSTOLIC BLOOD PRESSURE: 147 MMHG | RESPIRATION RATE: 18 BRPM | WEIGHT: 268.75 LBS

## 2023-05-16 LAB
POCT GLUCOSE: 135 MG/DL (ref 70–110)
POCT GLUCOSE: 148 MG/DL (ref 70–110)

## 2023-05-16 PROCEDURE — 97535 SELF CARE MNGMENT TRAINING: CPT

## 2023-05-16 PROCEDURE — 97530 THERAPEUTIC ACTIVITIES: CPT

## 2023-05-16 PROCEDURE — 63600175 PHARM REV CODE 636 W HCPCS: Performed by: INTERNAL MEDICINE

## 2023-05-16 PROCEDURE — 25000003 PHARM REV CODE 250: Performed by: STUDENT IN AN ORGANIZED HEALTH CARE EDUCATION/TRAINING PROGRAM

## 2023-05-16 PROCEDURE — 97116 GAIT TRAINING THERAPY: CPT

## 2023-05-16 PROCEDURE — 25000003 PHARM REV CODE 250: Performed by: INTERNAL MEDICINE

## 2023-05-16 RX ADMIN — HEPARIN SODIUM 5000 UNITS: 5000 INJECTION INTRAVENOUS; SUBCUTANEOUS at 09:05

## 2023-05-16 RX ADMIN — ASPIRIN 81 MG CHEWABLE TABLET 81 MG: 81 TABLET CHEWABLE at 09:05

## 2023-05-16 RX ADMIN — HYDROCHLOROTHIAZIDE 25 MG: 25 TABLET ORAL at 09:05

## 2023-05-16 RX ADMIN — AMLODIPINE BESYLATE 10 MG: 10 TABLET ORAL at 09:05

## 2023-05-16 RX ADMIN — LOSARTAN POTASSIUM 50 MG: 50 TABLET, FILM COATED ORAL at 09:05

## 2023-05-16 RX ADMIN — CLOPIDOGREL BISULFATE 75 MG: 75 TABLET ORAL at 09:05

## 2023-05-16 NOTE — PT/OT/SLP PROGRESS
"Occupational Therapy   Treatment    Name: Kenia Roper  MRN: [id number]  Admitting Diagnosis:  Cerebrovascular accident (CVA) due to embolism  9 Days Post-Op    Recommendations:     Discharge Recommendations: rehabilitation facility  Discharge Equipment Recommendations:  to be determined by next level of care  Barriers to discharge:  None    Assessment:     Kenia Roper is a 47 y.o. female with a medical diagnosis of Cerebrovascular accident (CVA) due to embolism.  She presents with the following performance deficits affecting function are weakness, impaired endurance, impaired self care skills, impaired functional mobility, decreased safety awareness, impaired cardiopulmonary response to activity, impaired skin, edema, pain.     Rehab Prognosis:  Good; patient would benefit from acute skilled OT services to address these deficits and reach maximum level of function.       Plan:     Patient to be seen 2 x/week to address the above listed problems via self-care/home management, therapeutic activities, therapeutic exercises  Plan of Care Expires: 05/18/23  Plan of Care Reviewed with: patient    Subjective     Chief Complaint: Reported "I am ready to go home."  Patient/Family Comments/goals: go home  Pain/Comfort:  Pain Rating 1: 7/10  Location 1: back  Pain Addressed 1:  (activity pacing)    Objective:     Communicated with: NurseFior, prior to session.  Patient found right sidelying with PICC line, telemetry upon OT entry to room.    General Precautions: Standard, fall    Orthopedic Precautions:N/A  Braces: N/A  Respiratory Status: Room air     Occupational Performance:     Bed Mobility:    Patient R sidelying with grandson lying in bed. Poor ability to maneuver safely while attempted to not wake grandson. Required mod-max A to complete full sup>sit EOB on L side.   Seated scooting after transition to EOB sitting- SBA    Functional Mobility/Transfers:  Patient completed Sit <> Stand Transfer with stand by " assistance  with  rolling walker   Patient completed Bed <> Chair Transfer using Step Transfer technique with stand by assistance with rolling walker  Functional Mobility: Patient completed x260ft functional mobility with RW and SBA to increase dynamic standing balance and activity tolerance needed for ADL completion.    Activities of Daily Living:  Upper Body Dressing: setup donned gown as robe with setup for item retrieval.   Lower Body Dressing: minimum assistance donned socks, from long sitting, with increased time and min A for adjustments for appropriate fit of L sock.    [facility] 6 Click ADL: 20    Treatment & Education:  Patient tolerated intervention well. Increased independence with transfers and functional mobility this date. Increased standing tolerance. Good RW management. Patient provided with education on techniques to use to maximize independence with LB dressing minimizing back pain. Patient encouraged to continue ramiro tband therex throughout the day to increase functional strength. Reported no questions regarding technique. Patient stated understanding of POC and in agreement to call for A to transfer back to bed.    Patient left up in chair with all lines intact, call button in reach, chair alarm on, and daughter and grandson present    GOALS:   Multidisciplinary Problems       Occupational Therapy Goals          Problem: Occupational Therapy    Goal Priority Disciplines Outcome Interventions   Occupational Therapy Goal     OT, PT/OT Ongoing, Progressing    Description: O.T. GOALS TO BE MET BY 5-18-23  PT WILL TOLERATE 1 SET X 15 REPS B UE AA/AROM EXERCISE  SBA WITH UE DRESSING  SPV WITH T/FS TO CHAIR.  SETUP ASSIST WITH TOILETING.                               Time Tracking:     OT Date of Treatment: 05/16/23  OT Start Time: 0850  OT Stop Time: 0915  OT Total Time (min): 25 min    Billable Minutes:Self Care/Home Management 10  Therapeutic Activity 15    5/16/2023

## 2023-05-16 NOTE — DISCHARGE SUMMARY
[facility] ([facility] Medicine  Discharge Summary      Patient Name: Kenia Roper  MRN: [id number]  [facility]: [id number]  Patient Class: IP- Inpatient  Admission Date: 5/3/2023  Hospital Length of Stay: 12 days  Discharge Date and Time:  05/16/2023 7:37 AM  Attending Physician: Heydi Cardozo DO   Discharging Provider: Heydi Cardozo DO  Primary Care Provider: No primary care provider on file.    Primary Care Team: Networked reference to record PCT     HPI:   Ms. Roper is a 57-year-old morbidly obese  female, was brought in to the ED in an unresponsive state.  No family at the bedside, unable to obtain any information from patient.  No information per Care everywhere.  Most of the information obtained from ED staff.  Per report, patient was found down at home, covered in feces and urine.  Last known well two days ago.  No report of fever or chills.  Known history of CVA and type 2 diabetes mellitus.  In the ED she is afebrile, mildly tachycardic HR .  WBC elevated 20,000. BUN 60, creatinine 3.5 (unknown baseline).  Elevated LFTs in the 400s.  Troponin elevated 0.259, unknown if she has chest pain or SOB.  EKG NSR with no ST T wave changes.  Hemodynamically stable.  Presumed severe sepsis with LUISA/AMS, she received 30 mL/kilogram bolus in the ED along with IV Zosyn empirically.  Cultures obtained.  UDS positive for opiates.  CT head is negative for acute intracranial pathology.  CXR negative for infectious process.    Admitting diagnosis:  Severe sepsis.  Acute encephalopathy (TIA versus uremia).  LUISA.  Elevated troponin.  Elevated LFTs.      Procedure(s) (LRB):  INCISION AND DRAINAGE, ABSCESS (N/A)      Hospital Course:   MRI brain revealed bilateral small scattered acute infarcts favoring embolic type infarcts.  Neurology consulted    Upon further examination, patient was found to have a large middle back abscess with purulent drainage.  CT of thoracic spine done without contrast  showed subcutaneous on organized air-fluid collection dorsally consistent with clinical abscess or gas producing infection with no overt underlying bony change or involvement of the spinal canal.  Patient started on empiric antibiotics with Zosyn and clindamycin.  General surgery consulted and recommended MRI of thoracic spine and Neurosurgery consultation.  MRI of thoracic spine showed no overt bony marrow edema and no overt compromise of the thoracic canal, subcutaneous abscess or phlegmon with cutaneous fistula with no overt extension into the spinal canal.    Neurosurgery recommends no neurosurgical intervention as there is no evidence of cord compromise.  General surgery performed I&D of back abscess on 05/07/2023.  Blood cultures with no growth.  Per General surgery, patient will require daily wet-to-dry dressing changes with Kerlix gauze, keep Penrose in place.  Patient will need to follow up surgery Clinic 2 weeks postop at which time Penrose will be removed    Echocardiogram showed LVEF 55%, normal left ventricular diastolic function, no evidence of intracardiac shunting.  She will need cardiac monitoring to rule out AFib Given embolic nature of stroke    Mentation improved, patient awake, alert, oriented although notable to have lower extremity weakness and difficulty with word finding.  Neurology consulted and recommended MRA brain which showed degraded image, scattered bilateral foci acute ischemia supratentorial similar to MRI brain, apparent relative diminished flow of left MCA which may be partially occlusive, otherwise no large vessel occlusion or critical stenosis noted.    THERAPISTS' RECS  --SLP: Soft & Bite Sized Diet - IDDSI Level 6, Liquid Diet Level: Thin liquids - IDDSI Level 0   --PT: Gait: PT AMB 50' X 2 TRIALS WITH RW AND MEHRAN, CUES FOR UPRIGHT POSTURE AND TAKING LARGER STEPS, 3 SMALL STANDING REST BREAKS, NO GROSS LOB, GOOD EFFORT. Balance: FAIR SITTING BALANCE, POOR+ DYNAMIC BALANCE  DURING GAIT  --rehab still recommended for post acute therapy    Initially patient decided to refuse rehab placement, however after extensive discussion with patient regarding her need for significant assistance at this time and lack of support at home as patient does not have any family other than her daughter and grandson who can help her at home.  Case management working on placement for discharge.  Accepted at [facility], awaiting insurance authorization.    Patient will need referral to establish with a PCP after discharge as she states she does not have a PCP    Patient was planned for discharge to rehab on 05/15/2023 after insurance authorization was received.  However she decided that she does not want to go to rehab after all, as she states that she needs to go home to help her daughter and grandson as there is a legal issue which require her involvement.  Risks of going home versus the benefits of going to rehab discussed with patient extensively.  And she is adamant that she does not want to go rehab and feels comfortable about going home with home health if she can get a walker.    Accepted by [facility]. Prescription for meds sent to [facility] pharmacy and brought to patient prior to leaving. Patient seen and examined prior to discharge on 5/16/2023. High-risk for readmission       Goals of Care Treatment Preferences:  Code Status: Full Code      Consults:   Consults (From admission, onward)        Status Ordering Provider     Inpatient consult to Registered Dietitian/Nutritionist  Once        Provider:  (Not yet assigned)    Completed NHUNG GOMES     Inpatient consult to Social Work  Once        Provider:  (Not yet assigned)    Completed SHAUNA BHATIA     Inpatient consult to Midline team  Once        Provider:  (Not yet assigned)    Acknowledged KIKI LICONA     Inpatient consult to Neurosurgery  Once        Provider:  Shauna Alonzo MD    Completed SHASHI PEREZ      "Inpatient consult to Registered Dietitian/Nutritionist  Once        Provider:  (Not yet assigned)    Completed MEDINA PAYNE     Inpatient consult to Telemedicine-General Neurology  Once        Provider:  (Not yet assigned)    Completed MEDINA PAYNE     Inpatient consult to General Surgery  Once        Provider:  Michael Gibson MD    Completed MEDINA PAYNE     Inpatient consult to Neurology  Once        Provider:  Luis M Flores MD    Completed DEANNA POPE     Inpatient consult to Nephrology  Once        Provider:  Gus Olivas MD    Completed DEANNA POPE     IP consult to case management/social work  Once        Provider:  (Not yet assigned)    Completed DEANNA POPE          No new Assessment & Plan notes have been filed under this hospital service since the last note was generated.  Service: Hospital Medicine    Final Active Diagnoses:    Diagnosis Date Noted POA    PRINCIPAL PROBLEM:  Cerebrovascular accident (CVA) due to embolism [I63.9] 05/05/2023 Yes    Elevated CPK [R74.8] 05/05/2023 Yes    Morbid obesity with BMI of 45.0-49.9, adult [E66.01, Z68.42] 05/04/2023 Not Applicable    Severe sepsis [A41.9, R65.20] 05/04/2023 Yes    LUISA (acute kidney injury) [N17.9] 05/04/2023 Yes    Acute metabolic encephalopathy [G93.41] 05/04/2023 Yes    Elevated troponin [R77.8] 05/04/2023 Yes    Type 2 diabetes mellitus with hyperglycemia [E11.65] 05/04/2023 Yes    Elevated LFTs [R79.89] 05/04/2023 Yes    High anion gap metabolic acidosis [E87.29] 05/04/2023 Yes    Back abscess [L02.212] 05/04/2023 Yes      Problems Resolved During this Admission:       Discharged Condition: fair    Disposition: Home-Health Care c    Follow Up:    Patient Instructions:      WALKER FOR HOME USE   Order Comments: bariatric     Order Specific Question Answer Comments   Type of Walker: Adult (5'4"-6'6")    With wheels? No    Height: 5' 4" (1.626 m)    Weight: 121.9 kg (268 lb 11.9 oz)    Length of " need (1-99 months): 99    Please check all that apply: Patient's condition impairs ambulation.    Please check all that apply: Patient is unable to safely ambulate without equipment.    Please check all that apply: Walker will be used for gait training.      Ambulatory referral/consult to Cardiology   Standing Status: Future   Referral Priority: Routine Referral Type: Consultation   Referral Reason: Specialty Services Required   Requested Specialty: Cardiology   Number of Visits Requested: 1     Ambulatory referral/consult to Vascular Neurology   Standing Status: Future   Referral Priority: Routine Referral Type: Consultation   Referral Reason: Specialty Services Required   Requested Specialty: Vascular Neurology   Number of Visits Requested: 1     Diet Dysphagia Soft   Order Comments: Soft & Bite Sized Diet, diabetic, cardiac     Notify your health care provider if you experience any of the following:  severe persistent headache     Notify your health care provider if you experience any of the following:  persistent dizziness, light-headedness, or visual disturbances     Notify your health care provider if you experience any of the following:  temperature >100.4     Activity as tolerated       Significant Diagnostic Studies: Labs:   Lipid Panel   Lab Results   Component Value Date    CHOL 142 05/04/2023    HDL 6 (L) 05/04/2023    LDLCALC 80.8 05/04/2023    TRIG 276 (H) 05/04/2023    CHOLHDL 4.2 (L) 05/04/2023   , A1C:   Recent Labs   Lab 05/04/23  0427   HGBA1C 7.1*    and All labs within the past 24 hours have been reviewed    Pending Diagnostic Studies:     None         Medications:  Reconciled Home Medications:      Medication List      START taking these medications    ACCU-CHEK GUIDE ME GLUCOSE MTR Misc  Generic drug: blood-glucose meter  Use to check blood sugar twice a day as directed     ACCU-CHEK GUIDE TEST STRIPS Strp  Generic drug: blood sugar diagnostic  Use to test blood sugar twice a day with meals      amLODIPine 10 MG tablet  Commonly known as: NORVASC  Take 1 tablet (10 mg total) by mouth once daily.     aspirin 81 MG Chew  Chew and swallow 1 tablet (81 mg total) by mouth once daily.     atorvastatin 80 MG tablet  Commonly known as: LIPITOR  Take 1 tablet (80 mg total) by mouth every evening.     clopidogreL 75 mg tablet  Commonly known as: PLAVIX  Take 1 tablet (75 mg total) by mouth once daily. for 21 days     hydroCHLOROthiazide 25 MG tablet  Commonly known as: HYDRODIURIL  Take 1 tablet (25 mg total) by mouth once daily.     lancets Misc  Use to check blood sugar twice a day with meals     lancing device Misc  1 Device by Misc.(Non-Drug; Combo Route) route 2 (two) times daily with meals.     losartan 50 MG tablet  Commonly known as: COZAAR  Take 1 tablet (50 mg total) by mouth once daily.     metFORMIN 500 MG tablet  Commonly known as: GLUCOPHAGE  Take 1 tablet (500 mg total) by mouth 2 (two) times daily with meals.            Indwelling Lines/Drains at time of discharge:   Lines/Drains/Airways     Drain  Duration                Open Drain 05/07/23 1152 Midline;Posterior Back Penrose  8 days                Time spent on the discharge of patient: 35 minutes         Heydi Cardozo DO  Department of Hospital Medicine  [facility] - Telemetry ([facility])

## 2023-05-16 NOTE — PLAN OF CARE
[facility] - Telemetry (Hospital)  Discharge Final Note    Primary Care Provider: No primary care provider on file.    Expected Discharge Date: 5/16/2023    Final Discharge Note (most recent)       Final Note - 05/16/23 1047          Final Note    Assessment Type Final Discharge Note     Anticipated Discharge Disposition Home-Health Care [facility] [place] [facility]     Hospital Resources/Appts/Education Provided Appointments scheduled and added to AVS        Post-Acute Status    Post-Acute Authorization Home Health;HME     HME Status Set-up Complete/Auth obtained     Home Health Status Set-up Complete/Auth obtained     Coverage [facility] Medicaid     Discharge Delays None known at this time                     Important Message from Medicare             Contact Info       [facility]   Specialty: Home Health Services    [address]  [place] [facility] [place] [address]   Phone: [phone]       Next Steps: Call in 3 day(s)    Instructions: home health  [facility] has been set up for you upon discharge.          DC Disposition: [facility]  Family Notified: Patient by nurse @ bedside  Transportation: Patient/ family    Patient had discharge plan of rehab facility, however due to family issue patient was adamant about going home once discharged. SW and doctor spoke to patient extensively about risks of going home vs. A facility. Patient stated to doctor that she feels comfortable going home if she can get Home Health and walker upon discharge.     Patient needed Home Health services set up upon discharge. Patient has [facility] set up.  Patient needed HME Rolling Walker reviewed and delivered to bedside. FELIX [facility] and [facility].      Patient has Post OP appointment with Gaby Lopez PA-C for drain removal on 5/29/23 at 1:00 pm.  Patient also has PCP follow up with new provider, MARV Bhagat, on 5/30/23 at 9:00 am.

## 2023-05-16 NOTE — PT/OT/SLP PROGRESS
Physical Therapy Treatment    Patient Name:  Kenia Roper   MRN:  [id number]    Recommendations:     Discharge Recommendations: rehabilitation facility  Discharge Equipment Recommendations: to be determined by next level of care  Barriers to discharge: None    Assessment:     Kenia Roper is a 47 y.o. female admitted with a medical diagnosis of Cerebrovascular accident (CVA) due to embolism.  She presents with the following impairments/functional limitations: weakness, impaired endurance, impaired functional mobility, gait instability, impaired balance, pain, decreased safety awareness, decreased lower extremity function, decreased coordination.    Rehab Prognosis: Good; patient would benefit from acute skilled PT services to address these deficits and reach maximum level of function.    Recent Surgery: Procedure(s) (LRB):  INCISION AND DRAINAGE, ABSCESS (N/A) 9 Days Post-Op    Plan:     During this hospitalization, patient to be seen 3 x/week to address the identified rehab impairments via gait training, therapeutic activities, therapeutic exercises and progress toward the following goals:    Plan of Care Expires:  05/18/23    Subjective     Chief Complaint: NONE, READY TO WALK  Patient/Family Comments/goals:   Pain/Comfort:  Pain Rating 1: 7/10  Location 1: back  Pain Addressed 1: Reposition, Distraction      Objective:     Communicated with NURSE QUEEN prior to session.  Patient found right sidelying with peripheral IV, PICC line, telemetry upon PT entry to room.     General Precautions: Standard, fall  Orthopedic Precautions: N/A  Braces: N/A  Respiratory Status: Room air     Functional Mobility:  Bed Mobility:     Rolling Left:  moderate assistance  Scooting: stand by assistance  Supine to Sit: maximal assistance  Transfers:     Sit to Stand:  stand by assistance with rolling walker  Bed to Chair: stand by assistance with  rolling walker  using  Step Transfer  Gait: PT ' WITH RW AND SBA, SLOW STEADY  "PACE, CUES FOR UPRIGHT POSTURE AND RW SAFETY, C/O MILD DIZZINESS, NO LOB OR SOB ON ROOM AIR  Balance: FAIR SITTING BALANCE, POOR+ DYNAMIC BALANCE    AM-PAC 6 CLICK MOBILITY  Turning over in bed (including adjusting bedclothes, sheets and blankets)?: 2  Sitting down on and standing up from a chair with arms (e.g., wheelchair, bedside commode, etc.): 4  Moving from lying on back to sitting on the side of the bed?: 2  Moving to and from a bed to a chair (including a wheelchair)?: 4  Need to walk in hospital room?: 4  Climbing 3-5 steps with a railing?: 1  Basic Mobility Total Score: 17     Treatment & Education:  REVIEW ROLE OF P.T. AND POC IN ACUTE CARE HOSPITAL SETTING, REVIEW RW USE AND SAFETY DURING TF'S AND GAIT, ENCOURAGED TO INCREASE TIME OOB IN CHAIR TO TOLERANCE, EDUCATED IN AND ENCOURAGED TO PERFORM BLE THEREX WHILE SEATED OR SUPINE THROUGHOUT THE DAY TO TOLERANCE: HIP FLEX/EXT, QUAD SET, LAQ, HEEL SLIDES, AP'S.  PT AGREEABLE TO REQUESTS  PT EDUCATED ON RISK FOR FALLS DUE TO GENERALIZED WEAKNESS, EDUCATED ON "CALL DON'T FALL", ENCOURAGED TO CALL FOR ASSISTANCE WITH ALL NEEDS SUCH AS BED<>CHAIR TRANSFERS OR TRIPS TO BATHROOM, PT AGREEABLE TO SAFETY PRECAUTIONS    Patient left up in chair with all lines intact, call button in reach, chair alarm on, NURSE notified, and FAMILY present..    GOALS:   Multidisciplinary Problems       Physical Therapy Goals          Problem: Physical Therapy    Goal Priority Disciplines Outcome Goal Variances Interventions   Physical Therapy Goal     PT, PT/OT Ongoing, Progressing     Description: Pt will perform bed mobility SBA in order to participate in EOB activity.  Pt will perform transfers CGA in order to participate in OOB activity.   Pt will ambulate 50ft CGA with LRAD in order to participate in daily tasks.                         Time Tracking:     PT Received On: 05/16/23  PT Start Time: 0835     PT Stop Time: 0900  PT Total Time (min): 25 min     Billable Minutes: Gait " Training 10 and Therapeutic Activity 15    Treatment Type: Treatment  PT/PTA: PT     Number of PTA visits since last PT visit: 0     05/16/2023

## 2023-05-16 NOTE — NURSING
Telemetry monitor removed and returned to monitor tech. Mid line removed. Discharge instructions reviewed with patient including discharge medications, follow-up appointments, diet, and activity restrictions. Wound care reviewed with patients daughter.  Patient and daughter verbalized understanding and voiced no concerns. All personal belongings, walker and wound care supplies left with patient. Awaiting ride home.

## 2023-05-16 NOTE — PLAN OF CARE
Aox4. Able to verbalize needs & follow commands. Calm and cooperative throughout shift.   POC reviewed with pt. Interventions implemented as appropriate.    VS stable; tele-monitor dc'ed d/t anticipated dc.  On RA.      Midline catheter to LUE. Saline locked. Integrity maintained.  Mechanical soft, cardiac diet. Faraz BID.   Wound to lower back. Pinrose drain in place. Four eyes on skin w/ MARKO Barriga.  No c/o pain.    Continent of b/b. Up to bsc.    CBG ac/hs.  Plavix for VTE.  Ambulatory. Activity ad maryjane. Frequent position changes encouraged. Able to reposition in bed independently.  Educated on s/sx of pressure injury;  verbalized understanding.  NADN. Resting quietly in bed.   Free of falls. Hourly rounding complete.   All safety measures remain in place. SR up x2; bed low & locked. Call light w/in reach.   Will continue to monitor throughout shift.  Chart check complete.

## 2023-05-16 NOTE — PROGRESS NOTES
C3 nurse [facility] up [facility]. No answer. Left voicemail with callback information. The patient has a scheduled HOSFU appointment with MARV Bhagat on 05/30/23 @ 0900.     Left cb information with answerer.

## 2023-05-17 NOTE — PROGRESS NOTES
2nd Attempt made to reach patient for TCC call. Left voicemail please call [phone] leave first name, last name, and  Yuliana will return your call.

## 2023-05-18 NOTE — PROGRESS NOTES
3rd Attempt made to reach patient for TCC call. Left voicemail please call [phone] leave first name, last name, and  Yuliana will return your call.

## 2023-05-22 ENCOUNTER — TELEPHONE (OUTPATIENT)
Dept: PHYSICAL MEDICINE AND REHAB | Facility: CLINIC | Age: 48
End: 2023-05-22
Payer: MEDICAID

## 2023-05-22 NOTE — TELEPHONE ENCOUNTER
Patient on weekly Stroke Core Measures Report. Patient has no phone, only has a  at this time. No phone call placed.  Julienne Gonzales RN

## 2023-05-29 ENCOUNTER — TELEPHONE (OUTPATIENT)
Dept: SURGERY | Facility: CLINIC | Age: 48
End: 2023-05-29
Payer: MEDICAID

## 2023-05-29 NOTE — TELEPHONE ENCOUNTER
Returned patient's callback. Patient called in regards to rescheduling appointment with [facility] for a later time due to transportation. Notified patient that she does not mind seeing patient later but she before 3:30 PM. Patient would need to reschedule to another day. Scheduled patient for 6/2 with [facility] at 10:15 AM. Patient verbalized understanding.

## 2023-06-06 ENCOUNTER — TELEPHONE (OUTPATIENT)
Dept: SURGERY | Facility: CLINIC | Age: 48
End: 2023-06-06
Payer: MEDICAID

## 2023-06-06 NOTE — TELEPHONE ENCOUNTER
----- Message from Rere Arshad sent at 6/6/2023  2:54 PM CDT -----  Contact: mateo/ daughter  Patients daughter is calling to speak with the nurse regarding appointment. Reports missing appointment and needs to reschedule. Please give the patients daughter a call back at [phone]  Thanks ramon

## 2023-06-09 ENCOUNTER — TELEPHONE (OUTPATIENT)
Dept: SURGICAL ONCOLOGY | Facility: CLINIC | Age: 48
End: 2023-06-09
Payer: MEDICAID

## 2023-06-09 NOTE — TELEPHONE ENCOUNTER
----- Message from Halina Casiano sent at 6/9/2023  8:55 AM CDT -----  Contact: Seble [facility]  Seble from [facility] is calling to speak with the nurse regarding patient care. Reports pt is non compliant with orders and [facility] thinks the pt drain is infected. Pt refused the ER. Please give Seble a call back at [phone]. Thanks LEBRON.

## 2023-06-09 NOTE — TELEPHONE ENCOUNTER
----- Message from Yuki Burkett sent at 6/9/2023 12:07 PM CDT -----  Contact: Kenia  Patient is calling to speak with the nurse in regards to appt. Reports needing to schedule post op appt. Please give patient a callback at [phone].

## 2023-06-19 ENCOUNTER — OFFICE VISIT (OUTPATIENT)
Dept: SURGERY | Facility: CLINIC | Age: 48
End: 2023-06-19
Payer: MEDICAID

## 2023-06-19 VITALS — DIASTOLIC BLOOD PRESSURE: 87 MMHG | HEART RATE: 89 BPM | SYSTOLIC BLOOD PRESSURE: 170 MMHG

## 2023-06-19 DIAGNOSIS — L02.212 BACK ABSCESS: Primary | ICD-10-CM

## 2023-06-19 DIAGNOSIS — E66.01 MORBID OBESITY WITH BMI OF 45.0-49.9, ADULT: ICD-10-CM

## 2023-06-19 PROCEDURE — 3051F PR MOST RECENT HEMOGLOBIN A1C LEVEL 7.0 - < 8.0%: ICD-10-PCS | Mod: CPTII,,,

## 2023-06-19 PROCEDURE — 3077F PR MOST RECENT SYSTOLIC BLOOD PRESSURE >= 140 MM HG: ICD-10-PCS | Mod: CPTII,,,

## 2023-06-19 PROCEDURE — 3079F DIAST BP 80-89 MM HG: CPT | Mod: CPTII,,,

## 2023-06-19 PROCEDURE — 99024 POSTOP FOLLOW-UP VISIT: CPT | Mod: ,,,

## 2023-06-19 PROCEDURE — 99999 PR PBB SHADOW E&M-EST. PATIENT-LVL III: ICD-10-PCS | Mod: PBBFAC,,,

## 2023-06-19 PROCEDURE — 1159F PR MEDICATION LIST DOCUMENTED IN MEDICAL RECORD: ICD-10-PCS | Mod: CPTII,,,

## 2023-06-19 PROCEDURE — 4010F PR ACE/ARB THEARPY RXD/TAKEN: ICD-10-PCS | Mod: CPTII,,,

## 2023-06-19 PROCEDURE — 99213 OFFICE O/P EST LOW 20 MIN: CPT | Mod: PBBFAC

## 2023-06-19 PROCEDURE — 1160F PR REVIEW ALL MEDS BY PRESCRIBER/CLIN PHARMACIST DOCUMENTED: ICD-10-PCS | Mod: CPTII,,,

## 2023-06-19 PROCEDURE — 3079F PR MOST RECENT DIASTOLIC BLOOD PRESSURE 80-89 MM HG: ICD-10-PCS | Mod: CPTII,,,

## 2023-06-19 PROCEDURE — 99999 PR PBB SHADOW E&M-EST. PATIENT-LVL III: CPT | Mod: PBBFAC,,,

## 2023-06-19 PROCEDURE — 1159F MED LIST DOCD IN RCRD: CPT | Mod: CPTII,,,

## 2023-06-19 PROCEDURE — 99024 PR POST-OP FOLLOW-UP VISIT: ICD-10-PCS | Mod: ,,,

## 2023-06-19 PROCEDURE — 1160F RVW MEDS BY RX/DR IN RCRD: CPT | Mod: CPTII,,,

## 2023-06-19 PROCEDURE — 3051F HG A1C>EQUAL 7.0%<8.0%: CPT | Mod: CPTII,,,

## 2023-06-19 PROCEDURE — 3077F SYST BP >= 140 MM HG: CPT | Mod: CPTII,,,

## 2023-06-19 PROCEDURE — 4010F ACE/ARB THERAPY RXD/TAKEN: CPT | Mod: CPTII,,,

## 2023-06-19 NOTE — PROGRESS NOTES
History & Physical    SUBJECTIVE:     History of Present Illness:  Patient is a 48 y.o. female presents for post-operative evaluation s/p back I&D and penrose drain placement on 05/07/2023. The penrose drain is still in place. She is having significant pain in the area that she relates to the drain. She has minimal drainage from the area. She was being followed by home health, but they are no longer coming. She denies fevers, chills, nausea, and vomiting.     Chief Complaint   Patient presents with    Post-op Evaluation     I&D breast 5/7// Dr. Spivey       Review of patient's allergies indicates:  No Known Allergies    Current Outpatient Medications   Medication Sig Dispense Refill    amLODIPine (NORVASC) 10 MG tablet Take 1 tablet (10 mg total) by mouth once daily. 90 tablet 0    aspirin 81 MG Chew Chew and swallow 1 tablet (81 mg total) by mouth once daily. 90 tablet 0    atorvastatin (LIPITOR) 80 MG tablet Take 1 tablet (80 mg total) by mouth every evening. 90 tablet 0    blood sugar diagnostic Strp Use to test blood sugar twice a day with meals 100 strip 0    blood-glucose meter Misc Use to check blood sugar twice a day as directed 1 each 0    hydroCHLOROthiazide (HYDRODIURIL) 25 MG tablet Take 1 tablet (25 mg total) by mouth once daily. 90 tablet 0    lancets Misc Use to check blood sugar twice a day with meals 100 each 0    lancing device Misc 1 Device by Misc.(Non-Drug; Combo Route) route 2 (two) times daily with meals. 1 each 0    losartan (COZAAR) 50 MG tablet Take 1 tablet (50 mg total) by mouth once daily. 90 tablet 0    metFORMIN (GLUCOPHAGE) 500 MG tablet Take 1 tablet (500 mg total) by mouth 2 (two) times daily with meals. 180 tablet 0    clopidogreL (PLAVIX) 75 mg tablet Take 1 tablet (75 mg total) by mouth once daily. for 21 days 21 tablet 0     No current facility-administered medications for this visit.       Past Medical History:   Diagnosis Date    Diabetes mellitus     Ovarian cancer     Stroke       Past Surgical History:   Procedure Laterality Date    INCISION AND DRAINAGE OF ABSCESS N/A 5/7/2023    Procedure: INCISION AND DRAINAGE, ABSCESS;  Surgeon: Bertin Spivey MD;  Location: [facility];  Service: General;  Laterality: N/A;     History reviewed. No pertinent family history.  Social History     Tobacco Use    Smoking status: Unknown        Review of Systems:  Review of Systems   Constitutional:  Negative for chills, fatigue, fever and unexpected weight change.   Respiratory:  Negative for cough, shortness of breath, wheezing and stridor.    Cardiovascular:  Negative for chest pain, palpitations and leg swelling.   Gastrointestinal:  Negative for abdominal distention, abdominal pain, constipation, diarrhea, nausea and vomiting.   Genitourinary:  Negative for difficulty urinating, dysuria, frequency, hematuria and urgency.   Skin:  Positive for wound (penrose in place). Negative for color change, pallor and rash.   Hematological:  Does not bruise/bleed easily.     OBJECTIVE:     Vital Signs (Most Recent)  Pulse: 89 (06/19/23 1420)  BP: (!) 170/87 (06/19/23 1420)           Physical Exam:  Physical Exam  Vitals reviewed.   Constitutional:       General: She is not in acute distress.     Appearance: She is well-developed. She is obese.      Comments: Ambulates with walker   HENT:      Head: Normocephalic and atraumatic.      Right Ear: External ear normal.      Left Ear: External ear normal.      Nose: Nose normal.      Mouth/Throat:      Mouth: Mucous membranes are moist.   Eyes:      Extraocular Movements: Extraocular movements intact.      Conjunctiva/sclera: Conjunctivae normal.   Cardiovascular:      Rate and Rhythm: Normal rate.   Pulmonary:      Effort: Pulmonary effort is normal. No respiratory distress.   Abdominal:      Palpations: Abdomen is soft.   Musculoskeletal:      Cervical back: Neck supple.   Skin:     General: Skin is warm and dry.          Neurological:      Mental Status: She is alert and  oriented to person, place, and time.   Psychiatric:         Behavior: Behavior normal.       ASSESSMENT/PLAN:     49 y/o female s/p I&D of back with penrose in place with drain removed today.    - Local wound care to previous penrose site. Irrigated well in clinic today.  - Advised to call with signs and symptoms similar to pre-operative symptoms such as increasing pain, purulent drainage, or fevers.  - RTC 2 weeks for wound check.    Gaby Lopez PA-C  [facility]

## 2023-07-24 ENCOUNTER — TELEPHONE (OUTPATIENT)
Dept: CARDIOLOGY | Facility: CLINIC | Age: 48
End: 2023-07-24
Payer: MEDICAID

## 2023-07-24 NOTE — TELEPHONE ENCOUNTER
Attempted without success to contact pt to discuss appt options. Left voicemail for pt to call back to discuss.    ----- Message from Yuki Burkett sent at 7/24/2023  4:08 PM CDT -----  Contact: Kenia  Patient is calling to speak with the nurse to reschedule initial appt from referral for provider. Please give patient a callback at [phone].

## 2023-07-31 ENCOUNTER — OFFICE VISIT (OUTPATIENT)
Dept: CARDIOLOGY | Facility: CLINIC | Age: 48
End: 2023-07-31
Payer: MEDICAID

## 2023-07-31 VITALS
DIASTOLIC BLOOD PRESSURE: 100 MMHG | SYSTOLIC BLOOD PRESSURE: 162 MMHG | HEART RATE: 83 BPM | OXYGEN SATURATION: 96 % | BODY MASS INDEX: 45.75 KG/M2 | WEIGHT: 268 LBS | HEIGHT: 64 IN

## 2023-07-31 DIAGNOSIS — R65.20 SEVERE SEPSIS: ICD-10-CM

## 2023-07-31 DIAGNOSIS — I63.40 CEREBROVASCULAR ACCIDENT (CVA) DUE TO EMBOLISM OF CEREBRAL ARTERY: ICD-10-CM

## 2023-07-31 DIAGNOSIS — R79.89 ELEVATED TROPONIN: ICD-10-CM

## 2023-07-31 DIAGNOSIS — E11.65 TYPE 2 DIABETES MELLITUS WITH HYPERGLYCEMIA, WITHOUT LONG-TERM CURRENT USE OF INSULIN: ICD-10-CM

## 2023-07-31 DIAGNOSIS — E66.01 MORBID OBESITY WITH BMI OF 45.0-49.9, ADULT: Primary | ICD-10-CM

## 2023-07-31 DIAGNOSIS — A41.9 SEVERE SEPSIS: ICD-10-CM

## 2023-07-31 PROCEDURE — 3008F BODY MASS INDEX DOCD: CPT | Mod: CPTII,,, | Performed by: INTERNAL MEDICINE

## 2023-07-31 PROCEDURE — 3080F DIAST BP >= 90 MM HG: CPT | Mod: CPTII,,, | Performed by: INTERNAL MEDICINE

## 2023-07-31 PROCEDURE — 99999 PR PBB SHADOW E&M-EST. PATIENT-LVL IV: CPT | Mod: PBBFAC,,, | Performed by: INTERNAL MEDICINE

## 2023-07-31 PROCEDURE — 3066F NEPHROPATHY DOC TX: CPT | Mod: CPTII,,, | Performed by: INTERNAL MEDICINE

## 2023-07-31 PROCEDURE — 3008F PR BODY MASS INDEX (BMI) DOCUMENTED: ICD-10-PCS | Mod: CPTII,,, | Performed by: INTERNAL MEDICINE

## 2023-07-31 PROCEDURE — 3060F PR POS MICROALBUMINURIA RESULT DOCUMENTED/REVIEW: ICD-10-PCS | Mod: CPTII,,, | Performed by: INTERNAL MEDICINE

## 2023-07-31 PROCEDURE — 99999 PR PBB SHADOW E&M-EST. PATIENT-LVL IV: ICD-10-PCS | Mod: PBBFAC,,, | Performed by: INTERNAL MEDICINE

## 2023-07-31 PROCEDURE — 3066F PR DOCUMENTATION OF TREATMENT FOR NEPHROPATHY: ICD-10-PCS | Mod: CPTII,,, | Performed by: INTERNAL MEDICINE

## 2023-07-31 PROCEDURE — 4010F PR ACE/ARB THEARPY RXD/TAKEN: ICD-10-PCS | Mod: CPTII,,, | Performed by: INTERNAL MEDICINE

## 2023-07-31 PROCEDURE — 3077F SYST BP >= 140 MM HG: CPT | Mod: CPTII,,, | Performed by: INTERNAL MEDICINE

## 2023-07-31 PROCEDURE — 99205 OFFICE O/P NEW HI 60 MIN: CPT | Mod: S$PBB,,, | Performed by: INTERNAL MEDICINE

## 2023-07-31 PROCEDURE — 3077F PR MOST RECENT SYSTOLIC BLOOD PRESSURE >= 140 MM HG: ICD-10-PCS | Mod: CPTII,,, | Performed by: INTERNAL MEDICINE

## 2023-07-31 PROCEDURE — 3044F PR MOST RECENT HEMOGLOBIN A1C LEVEL <7.0%: ICD-10-PCS | Mod: CPTII,,, | Performed by: INTERNAL MEDICINE

## 2023-07-31 PROCEDURE — 99214 OFFICE O/P EST MOD 30 MIN: CPT | Mod: PBBFAC | Performed by: INTERNAL MEDICINE

## 2023-07-31 PROCEDURE — 99205 PR OFFICE/OUTPT VISIT, NEW, LEVL V, 60-74 MIN: ICD-10-PCS | Mod: S$PBB,,, | Performed by: INTERNAL MEDICINE

## 2023-07-31 PROCEDURE — 3044F HG A1C LEVEL LT 7.0%: CPT | Mod: CPTII,,, | Performed by: INTERNAL MEDICINE

## 2023-07-31 PROCEDURE — 3060F POS MICROALBUMINURIA REV: CPT | Mod: CPTII,,, | Performed by: INTERNAL MEDICINE

## 2023-07-31 PROCEDURE — 3080F PR MOST RECENT DIASTOLIC BLOOD PRESSURE >= 90 MM HG: ICD-10-PCS | Mod: CPTII,,, | Performed by: INTERNAL MEDICINE

## 2023-07-31 PROCEDURE — 4010F ACE/ARB THERAPY RXD/TAKEN: CPT | Mod: CPTII,,, | Performed by: INTERNAL MEDICINE

## 2023-07-31 RX ORDER — LOSARTAN POTASSIUM 100 MG/1
100 TABLET ORAL DAILY
Qty: 90 TABLET | Refills: 3 | Status: SHIPPED | OUTPATIENT
Start: 2023-07-31 | End: 2024-07-25

## 2023-07-31 NOTE — PROGRESS NOTES
Subjective:   Patient ID:  Kenia Roper is a 48 y.o. female who presents for follow-up of No chief complaint on file.  [facility] may 2023:  HPI:   Ms. Roper is a 57-year-old morbidly obese  female, was brought in to the ED in an unresponsive state.  No family at the bedside, unable to obtain any information from patient.  No information per Care everywhere.  Most of the information obtained from ED staff.  Per report, patient was found down at home, covered in feces and urine.  Last known well two days ago.  No report of fever or chills.  Known history of CVA and type 2 diabetes mellitus.  In the ED she is afebrile, mildly tachycardic HR .  WBC elevated 20,000. BUN 60, creatinine 3.5 (unknown baseline).  Elevated LFTs in the 400s.  Troponin elevated 0.259, unknown if she has chest pain or SOB.  EKG NSR with no ST T wave changes.  Hemodynamically stable.  Presumed severe sepsis with LUISA/AMS, she received 30 mL/kilogram bolus in the ED along with IV Zosyn empirically.  Cultures obtained.  UDS positive for opiates.  CT head is negative for acute intracranial pathology.  CXR negative for infectious process.    Pt with atypical CP- sharp, L sided, diffuse, last half of day.Pt states SOB  Echo 5-23 nml    Hypertension  This is a chronic problem. The current episode started more than 1 year ago. The problem has been gradually improving since onset. Pertinent negatives include no chest pain, palpitations or shortness of breath. Past treatments include angiotensin blockers, diuretics and calcium channel blockers. The current treatment provides moderate improvement. There are no compliance problems.    Hyperlipidemia  This is a chronic problem. The current episode started more than 1 year ago. The problem is controlled. Pertinent negatives include no chest pain or shortness of breath. Current antihyperlipidemic treatment includes statins. The current treatment provides moderate improvement of  lipids. There are no compliance problems.  Risk factors for coronary artery disease include hypertension and dyslipidemia.       Review of Systems   Constitutional: Negative. Negative for weight gain.   HENT: Negative.     Eyes: Negative.    Cardiovascular: Negative.  Negative for chest pain, leg swelling and palpitations.   Respiratory: Negative.  Negative for shortness of breath.    Endocrine: Negative.    Hematologic/Lymphatic: Negative.    Skin: Negative.    Musculoskeletal:  Negative for muscle weakness.   Gastrointestinal: Negative.    Genitourinary: Negative.    Neurological: Negative.  Negative for dizziness.   Psychiatric/Behavioral: Negative.     Allergic/Immunologic: Negative.    All other systems reviewed and are negative.    History reviewed. No pertinent family history.  Past Medical History:   Diagnosis Date    Diabetes mellitus     Ovarian cancer     Stroke      Social History     Socioeconomic History    Marital status: Unknown   Tobacco Use    Smoking status: Unknown     Current Outpatient Medications on File Prior to Visit   Medication Sig Dispense Refill    amLODIPine (NORVASC) 10 MG tablet Take 1 tablet (10 mg total) by mouth once daily. 90 tablet 0    aspirin 81 MG Chew Chew and swallow 1 tablet (81 mg total) by mouth once daily. 90 tablet 0    atorvastatin (LIPITOR) 80 MG tablet Take 1 tablet (80 mg total) by mouth every evening. 90 tablet 0    blood sugar diagnostic Strp Use to test blood sugar twice a day with meals 100 strip 0    blood-glucose meter Misc Use to check blood sugar twice a day as directed 1 each 0    hydroCHLOROthiazide (HYDRODIURIL) 25 MG tablet Take 1 tablet (25 mg total) by mouth once daily. 90 tablet 0    lancets Misc Use to check blood sugar twice a day with meals 100 each 0    lancing device Misc 1 Device by Misc.(Non-Drug; Combo Route) route 2 (two) times daily with meals. 1 each 0    losartan (COZAAR) 50 MG tablet Take 1 tablet (50 mg total) by mouth once daily. 90  tablet 0    metFORMIN (GLUCOPHAGE) 500 MG tablet Take 1 tablet (500 mg total) by mouth 2 (two) times daily with meals. 180 tablet 0    clopidogreL (PLAVIX) 75 mg tablet Take 1 tablet (75 mg total) by mouth once daily. for 21 days 21 tablet 0     No current facility-administered medications on file prior to visit.     Review of patient's allergies indicates:  No Known Allergies    Objective:     Physical Exam  Vitals and nursing note reviewed.   Constitutional:       Appearance: She is well-developed.   HENT:      Head: Normocephalic and atraumatic.   Eyes:      Conjunctiva/sclera: Conjunctivae normal.      Pupils: Pupils are equal, round, and reactive to light.   Cardiovascular:      Rate and Rhythm: Normal rate and regular rhythm.      Pulses: Intact distal pulses.      Heart sounds: Normal heart sounds.   Pulmonary:      Effort: Pulmonary effort is normal.      Breath sounds: Normal breath sounds.   Abdominal:      General: Bowel sounds are normal.      Palpations: Abdomen is soft.   Musculoskeletal:         General: Normal range of motion.      Cervical back: Normal range of motion and neck supple.   Skin:     General: Skin is warm and dry.   Neurological:      Mental Status: She is alert and oriented to person, place, and time.         Assessment:     1. Morbid obesity with BMI of 45.0-49.9, adult    2. Elevated troponin    3. Severe sepsis    4. Cerebrovascular accident (CVA) due to embolism of cerebral artery    5. Type 2 diabetes mellitus with hyperglycemia, without long-term current use of insulin        Plan:     Morbid obesity with BMI of 45.0-49.9, adult    Elevated troponin    Severe sepsis    Cerebrovascular accident (CVA) due to embolism of cerebral artery    Type 2 diabetes mellitus with hyperglycemia, without long-term current use of insulin      NMT/stress test  Cardiac monitor

## 2023-08-01 ENCOUNTER — PATIENT OUTREACH (OUTPATIENT)
Dept: ADMINISTRATIVE | Facility: HOSPITAL | Age: 48
End: 2023-08-01
Payer: MEDICAID

## 2023-08-02 ENCOUNTER — HOSPITAL ENCOUNTER (EMERGENCY)
Facility: HOSPITAL | Age: 48
Discharge: HOME | End: 2023-08-02
Attending: EMERGENCY MEDICINE
Payer: MEDICAID

## 2023-08-02 ENCOUNTER — PATIENT OUTREACH (OUTPATIENT)
Dept: ADMINISTRATIVE | Facility: OTHER | Age: 48
End: 2023-08-02
Payer: MEDICAID

## 2023-08-02 ENCOUNTER — OFFICE VISIT (OUTPATIENT)
Dept: PRIMARY CARE CLINIC | Facility: CLINIC | Age: 48
End: 2023-08-02
Payer: MEDICAID

## 2023-08-02 VITALS
BODY MASS INDEX: 44.11 KG/M2 | HEIGHT: 64 IN | SYSTOLIC BLOOD PRESSURE: 178 MMHG | HEART RATE: 83 BPM | WEIGHT: 258.38 LBS | DIASTOLIC BLOOD PRESSURE: 118 MMHG | OXYGEN SATURATION: 97 % | TEMPERATURE: 97 F

## 2023-08-02 VITALS
RESPIRATION RATE: 20 BRPM | SYSTOLIC BLOOD PRESSURE: 187 MMHG | TEMPERATURE: 99 F | BODY MASS INDEX: 44.31 KG/M2 | DIASTOLIC BLOOD PRESSURE: 90 MMHG | OXYGEN SATURATION: 98 % | WEIGHT: 258.19 LBS | HEART RATE: 79 BPM

## 2023-08-02 DIAGNOSIS — Z11.4 SCREENING FOR HIV (HUMAN IMMUNODEFICIENCY VIRUS): ICD-10-CM

## 2023-08-02 DIAGNOSIS — E11.9 ENCOUNTER FOR DIABETIC FOOT EXAM: ICD-10-CM

## 2023-08-02 DIAGNOSIS — I25.2 HISTORY OF MI (MYOCARDIAL INFARCTION): ICD-10-CM

## 2023-08-02 DIAGNOSIS — Z11.59 ENCOUNTER FOR HEPATITIS C SCREENING TEST FOR LOW RISK PATIENT: ICD-10-CM

## 2023-08-02 DIAGNOSIS — I10 ESSENTIAL HYPERTENSION: ICD-10-CM

## 2023-08-02 DIAGNOSIS — R07.9 CHEST PAIN: Primary | ICD-10-CM

## 2023-08-02 DIAGNOSIS — Z12.11 COLON CANCER SCREENING: ICD-10-CM

## 2023-08-02 DIAGNOSIS — I10 HYPERTENSION, UNSPECIFIED TYPE: ICD-10-CM

## 2023-08-02 DIAGNOSIS — Z13.6 ENCOUNTER FOR LIPID SCREENING FOR CARDIOVASCULAR DISEASE: ICD-10-CM

## 2023-08-02 DIAGNOSIS — Z12.4 SCREENING FOR CERVICAL CANCER: ICD-10-CM

## 2023-08-02 DIAGNOSIS — Z86.39 HISTORY OF METABOLIC AND NUTRITIONAL DISORDER: ICD-10-CM

## 2023-08-02 DIAGNOSIS — Z13.220 ENCOUNTER FOR LIPID SCREENING FOR CARDIOVASCULAR DISEASE: ICD-10-CM

## 2023-08-02 DIAGNOSIS — R07.9 CHEST PAIN, UNSPECIFIED TYPE: ICD-10-CM

## 2023-08-02 DIAGNOSIS — E11.65 TYPE 2 DIABETES MELLITUS WITH HYPERGLYCEMIA, WITHOUT LONG-TERM CURRENT USE OF INSULIN: Primary | ICD-10-CM

## 2023-08-02 LAB
ALBUMIN SERPL BCP-MCNC: 3.6 G/DL (ref 3.5–5.2)
ALBUMIN/CREAT UR: 37.1 UG/MG (ref 0–30)
ALP SERPL-CCNC: 73 U/L (ref 55–135)
ALT SERPL W/O P-5'-P-CCNC: 9 U/L (ref 10–44)
ANION GAP SERPL CALC-SCNC: 12 MMOL/L (ref 8–16)
AST SERPL-CCNC: 13 U/L (ref 10–40)
BACTERIA #/AREA URNS HPF: ABNORMAL /HPF
BASOPHILS # BLD AUTO: 0.03 K/UL (ref 0–0.2)
BASOPHILS NFR BLD: 0.3 % (ref 0–1.9)
BILIRUB SERPL-MCNC: 0.5 MG/DL (ref 0.1–1)
BILIRUB UR QL STRIP: NEGATIVE
BNP SERPL-MCNC: 23 PG/ML (ref 0–99)
BUN SERPL-MCNC: 12 MG/DL (ref 6–20)
CALCIUM SERPL-MCNC: 9.8 MG/DL (ref 8.7–10.5)
CHLORIDE SERPL-SCNC: 104 MMOL/L (ref 95–110)
CK SERPL-CCNC: 83 U/L (ref 20–180)
CLARITY UR: ABNORMAL
CO2 SERPL-SCNC: 27 MMOL/L (ref 23–29)
COLOR UR: YELLOW
CREAT SERPL-MCNC: 1.1 MG/DL (ref 0.5–1.4)
CREAT UR-MCNC: 143 MG/DL (ref 15–325)
DIFFERENTIAL METHOD: ABNORMAL
EOSINOPHIL # BLD AUTO: 0.2 K/UL (ref 0–0.5)
EOSINOPHIL NFR BLD: 1.4 % (ref 0–8)
ERYTHROCYTE [DISTWIDTH] IN BLOOD BY AUTOMATED COUNT: 14.5 % (ref 11.5–14.5)
EST. GFR  (NO RACE VARIABLE): >60 ML/MIN/1.73 M^2
GLUCOSE SERPL-MCNC: 119 MG/DL (ref 70–110)
GLUCOSE UR QL STRIP: NEGATIVE
HCT VFR BLD AUTO: 32 % (ref 37–48.5)
HGB BLD-MCNC: 9.6 G/DL (ref 12–16)
HGB UR QL STRIP: NEGATIVE
HYALINE CASTS #/AREA URNS LPF: 6 /LPF
IMM GRANULOCYTES # BLD AUTO: 0.05 K/UL (ref 0–0.04)
IMM GRANULOCYTES NFR BLD AUTO: 0.4 % (ref 0–0.5)
KETONES UR QL STRIP: NEGATIVE
LEUKOCYTE ESTERASE UR QL STRIP: NEGATIVE
LYMPHOCYTES # BLD AUTO: 2.9 K/UL (ref 1–4.8)
LYMPHOCYTES NFR BLD: 24.2 % (ref 18–48)
MCH RBC QN AUTO: 22 PG (ref 27–31)
MCHC RBC AUTO-ENTMCNC: 30 G/DL (ref 32–36)
MCV RBC AUTO: 73 FL (ref 82–98)
MICROALBUMIN UR DL<=1MG/L-MCNC: 53 UG/ML
MICROSCOPIC COMMENT: ABNORMAL
MONOCYTES # BLD AUTO: 0.5 K/UL (ref 0.3–1)
MONOCYTES NFR BLD: 4.5 % (ref 4–15)
NEUTROPHILS # BLD AUTO: 8.3 K/UL (ref 1.8–7.7)
NEUTROPHILS NFR BLD: 69.2 % (ref 38–73)
NITRITE UR QL STRIP: NEGATIVE
NRBC BLD-RTO: 0 /100 WBC
PH UR STRIP: 6 [PH] (ref 5–8)
PLATELET # BLD AUTO: 300 K/UL (ref 150–450)
PMV BLD AUTO: 10.5 FL (ref 9.2–12.9)
POTASSIUM SERPL-SCNC: 3.5 MMOL/L (ref 3.5–5.1)
PROT SERPL-MCNC: 7.9 G/DL (ref 6–8.4)
PROT UR QL STRIP: ABNORMAL
RBC # BLD AUTO: 4.37 M/UL (ref 4–5.4)
RBC #/AREA URNS HPF: 0 /HPF (ref 0–4)
SODIUM SERPL-SCNC: 143 MMOL/L (ref 136–145)
SP GR UR STRIP: 1.02 (ref 1–1.03)
SQUAMOUS #/AREA URNS HPF: 13 /HPF
TROPONIN I SERPL DL<=0.01 NG/ML-MCNC: <0.006 NG/ML (ref 0–0.03)
URN SPEC COLLECT METH UR: ABNORMAL
UROBILINOGEN UR STRIP-ACNC: ABNORMAL EU/DL
WBC # BLD AUTO: 11.96 K/UL (ref 3.9–12.7)
WBC #/AREA URNS HPF: 1 /HPF (ref 0–5)

## 2023-08-02 PROCEDURE — 93010 ELECTROCARDIOGRAM REPORT: CPT | Mod: S$PBB,,, | Performed by: INTERNAL MEDICINE

## 2023-08-02 PROCEDURE — 3044F HG A1C LEVEL LT 7.0%: CPT | Mod: CPTII,,, | Performed by: NURSE PRACTITIONER

## 2023-08-02 PROCEDURE — 82550 ASSAY OF CK (CPK): CPT | Performed by: EMERGENCY MEDICINE

## 2023-08-02 PROCEDURE — 3077F PR MOST RECENT SYSTOLIC BLOOD PRESSURE >= 140 MM HG: ICD-10-PCS | Mod: CPTII,,, | Performed by: NURSE PRACTITIONER

## 2023-08-02 PROCEDURE — 3077F SYST BP >= 140 MM HG: CPT | Mod: CPTII,,, | Performed by: NURSE PRACTITIONER

## 2023-08-02 PROCEDURE — 3066F PR DOCUMENTATION OF TREATMENT FOR NEPHROPATHY: ICD-10-PCS | Mod: CPTII,,, | Performed by: NURSE PRACTITIONER

## 2023-08-02 PROCEDURE — 1159F PR MEDICATION LIST DOCUMENTED IN MEDICAL RECORD: ICD-10-PCS | Mod: CPTII,,, | Performed by: NURSE PRACTITIONER

## 2023-08-02 PROCEDURE — 99215 OFFICE O/P EST HI 40 MIN: CPT | Mod: PBBFAC,25,27 | Performed by: NURSE PRACTITIONER

## 2023-08-02 PROCEDURE — 82570 ASSAY OF URINE CREATININE: CPT | Performed by: NURSE PRACTITIONER

## 2023-08-02 PROCEDURE — 80053 COMPREHEN METABOLIC PANEL: CPT | Performed by: EMERGENCY MEDICINE

## 2023-08-02 PROCEDURE — 3044F PR MOST RECENT HEMOGLOBIN A1C LEVEL <7.0%: ICD-10-PCS | Mod: CPTII,,, | Performed by: NURSE PRACTITIONER

## 2023-08-02 PROCEDURE — 84484 ASSAY OF TROPONIN QUANT: CPT | Performed by: EMERGENCY MEDICINE

## 2023-08-02 PROCEDURE — 83880 ASSAY OF NATRIURETIC PEPTIDE: CPT | Performed by: EMERGENCY MEDICINE

## 2023-08-02 PROCEDURE — 3060F POS MICROALBUMINURIA REV: CPT | Mod: CPTII,,, | Performed by: NURSE PRACTITIONER

## 2023-08-02 PROCEDURE — 3008F PR BODY MASS INDEX (BMI) DOCUMENTED: ICD-10-PCS | Mod: CPTII,,, | Performed by: NURSE PRACTITIONER

## 2023-08-02 PROCEDURE — 99285 EMERGENCY DEPT VISIT HI MDM: CPT | Mod: 25

## 2023-08-02 PROCEDURE — 99999 PR PBB SHADOW E&M-EST. PATIENT-LVL V: CPT | Mod: PBBFAC,,, | Performed by: NURSE PRACTITIONER

## 2023-08-02 PROCEDURE — 3080F DIAST BP >= 90 MM HG: CPT | Mod: CPTII,,, | Performed by: NURSE PRACTITIONER

## 2023-08-02 PROCEDURE — 4010F PR ACE/ARB THEARPY RXD/TAKEN: ICD-10-PCS | Mod: CPTII,,, | Performed by: NURSE PRACTITIONER

## 2023-08-02 PROCEDURE — 3008F BODY MASS INDEX DOCD: CPT | Mod: CPTII,,, | Performed by: NURSE PRACTITIONER

## 2023-08-02 PROCEDURE — 3066F NEPHROPATHY DOC TX: CPT | Mod: CPTII,,, | Performed by: NURSE PRACTITIONER

## 2023-08-02 PROCEDURE — 63600175 PHARM REV CODE 636 W HCPCS: Performed by: EMERGENCY MEDICINE

## 2023-08-02 PROCEDURE — 81000 URINALYSIS NONAUTO W/SCOPE: CPT | Performed by: EMERGENCY MEDICINE

## 2023-08-02 PROCEDURE — 93005 ELECTROCARDIOGRAM TRACING: CPT | Mod: PBBFAC,PN | Performed by: INTERNAL MEDICINE

## 2023-08-02 PROCEDURE — 99204 PR OFFICE/OUTPT VISIT, NEW, LEVL IV, 45-59 MIN: ICD-10-PCS | Mod: S$PBB,,, | Performed by: NURSE PRACTITIONER

## 2023-08-02 PROCEDURE — 3060F PR POS MICROALBUMINURIA RESULT DOCUMENTED/REVIEW: ICD-10-PCS | Mod: CPTII,,, | Performed by: NURSE PRACTITIONER

## 2023-08-02 PROCEDURE — 96374 THER/PROPH/DIAG INJ IV PUSH: CPT

## 2023-08-02 PROCEDURE — 99999 PR PBB SHADOW E&M-EST. PATIENT-LVL V: ICD-10-PCS | Mod: PBBFAC,,, | Performed by: NURSE PRACTITIONER

## 2023-08-02 PROCEDURE — 99204 OFFICE O/P NEW MOD 45 MIN: CPT | Mod: S$PBB,,, | Performed by: NURSE PRACTITIONER

## 2023-08-02 PROCEDURE — 93010 EKG 12-LEAD: ICD-10-PCS | Mod: S$PBB,,, | Performed by: INTERNAL MEDICINE

## 2023-08-02 PROCEDURE — 3080F PR MOST RECENT DIASTOLIC BLOOD PRESSURE >= 90 MM HG: ICD-10-PCS | Mod: CPTII,,, | Performed by: NURSE PRACTITIONER

## 2023-08-02 PROCEDURE — 1159F MED LIST DOCD IN RCRD: CPT | Mod: CPTII,,, | Performed by: NURSE PRACTITIONER

## 2023-08-02 PROCEDURE — 85025 COMPLETE CBC W/AUTO DIFF WBC: CPT | Performed by: EMERGENCY MEDICINE

## 2023-08-02 PROCEDURE — 4010F ACE/ARB THERAPY RXD/TAKEN: CPT | Mod: CPTII,,, | Performed by: NURSE PRACTITIONER

## 2023-08-02 RX ORDER — HYDRALAZINE HYDROCHLORIDE 20 MG/ML
10 INJECTION INTRAMUSCULAR; INTRAVENOUS
Status: COMPLETED | OUTPATIENT
Start: 2023-08-02 | End: 2023-08-02

## 2023-08-02 RX ADMIN — HYDRALAZINE HYDROCHLORIDE 10 MG: 20 INJECTION, SOLUTION INTRAMUSCULAR; INTRAVENOUS at 03:08

## 2023-08-02 NOTE — ED PROVIDER NOTES
SCRIBE #1 NOTE: I, Carmelita Ba, am scribing for, and in the presence of, Marshall Fuentes MD. I have scribed the entire note.       History     Chief Complaint   Patient presents with    Chest Pain     Pt. Reports she has been having chest pain all morning. She was seen at her PCP and told to come to the ER.      Review of patient's allergies indicates:  No Known Allergies      History of Present Illness     HPI    8/2/2023, 1:32 PM  History obtained from the patient      History of Present Illness: Kenia Roper is a 48 y.o. female patient with a PMHx of ovarian CA, DM, and stroke who presents to the Emergency Department for evaluation of CP which onset this AM. Pt was seen at her PCP and told to come to ER. She reports she thinks she is having a heart attack. Symptoms are constant and moderate in severity. No mitigating or exacerbating factors reported. No associated sxs included. Patient denies any SOB, leg swelling, palpitations, fever, nausea, and all other sxs at this time. No further complaints or concerns at this time.       Arrival mode: Personal vehicle     PCP: Primary Doctor No        Past Medical History:  Past Medical History:   Diagnosis Date    Diabetes mellitus     Ovarian cancer     Stroke        Past Surgical History:  Past Surgical History:   Procedure Laterality Date    INCISION AND DRAINAGE OF ABSCESS N/A 5/7/2023    Procedure: INCISION AND DRAINAGE, ABSCESS;  Surgeon: Bertin Spivey MD;  Location: [facility];  Service: General;  Laterality: N/A;         Family History:  No family history on file.    Social History:  Social History     Tobacco Use    Smoking status: Former     Current packs/day: 1.00     Average packs/day: 1 pack/day for 0.1 years (0.1 ttl pk-yrs)     Types: Cigarettes     Start date: 7/4/2023    Smokeless tobacco: Not on file   Substance and Sexual Activity    Alcohol use: Not on file    Drug use: Not on file    Sexual activity: Not on file        Review of Systems      Review of Systems   Constitutional:  Negative for fever.   HENT:  Negative for sore throat.    Respiratory:  Negative for shortness of breath.    Cardiovascular:  Positive for chest pain. Negative for palpitations and leg swelling.   Gastrointestinal:  Negative for nausea.   Genitourinary:  Negative for dysuria.   Musculoskeletal:  Negative for back pain.   Skin:  Negative for rash.   Neurological:  Negative for weakness.   Hematological:  Does not bruise/bleed easily.   All other systems reviewed and are negative.       Physical Exam     Initial Vitals [08/02/23 1321]   BP Pulse Resp Temp SpO2   (!) 215/110 88 18 98.9 °F (37.2 °C) 96 %      MAP       --          Physical Exam  Nursing Notes and Vital Signs Reviewed.  Constitutional: Patient is in no acute distress. Well-developed and well-nourished.  Head: Atraumatic. Normocephalic.  Eyes: PERRL. EOM intact. Conjunctivae are not pale. No scleral icterus.  ENT: Mucous membranes are moist. Oropharynx is clear and symmetric.    Neck: Supple. Full ROM. No lymphadenopathy.  Cardiovascular: Regular rate. Regular rhythm. No murmurs, rubs, or gallops. Distal pulses are 2+ and symmetric.  Pulmonary/Chest: No respiratory distress. Clear to auscultation bilaterally. No wheezing or rales.  Abdominal: Soft and non-distended.  There is no tenderness.  No rebound, guarding, or rigidity. Good bowel sound.  Genitourinary: No CVA tenderness  Musculoskeletal: Moves all extremities. No obvious deformities. No edema. No calf tenderness.  Skin: Warm and dry.  Neurological:  Alert, awake, and appropriate.  Normal speech.  No acute focal neurological deficits are appreciated.  Psychiatric: Normal affect. Good eye contact. Appropriate in content.     ED Course   Procedures  ED Vital Signs:  Vitals:    08/02/23 1321 08/02/23 1433   BP: (!) 215/110 (!) 199/96   Pulse: 88 79   Resp: 18 20   Temp: 98.9 °F (37.2 °C)    TempSrc: Oral    SpO2: 96% 98%   Weight: 117.1 kg (258 lb 2.5 oz)         Abnormal Lab Results:  Labs Reviewed   CBC W/ AUTO DIFFERENTIAL - Abnormal; Notable for the following components:       Result Value    Hemoglobin 9.6 (*)     Hematocrit 32.0 (*)     MCV 73 (*)     MCH 22.0 (*)     MCHC 30.0 (*)     Gran # (ANC) 8.3 (*)     Immature Grans (Abs) 0.05 (*)     All other components within normal limits   COMPREHENSIVE METABOLIC PANEL - Abnormal; Notable for the following components:    Glucose 119 (*)     ALT 9 (*)     All other components within normal limits   URINALYSIS, REFLEX TO URINE CULTURE - Abnormal; Notable for the following components:    Appearance, UA Hazy (*)     Protein, UA 1+ (*)     Urobilinogen, UA 2.0-3.0 (*)     All other components within normal limits    Narrative:     Specimen Source->Urine   URINALYSIS MICROSCOPIC - Abnormal; Notable for the following components:    Hyaline Casts, UA 6 (*)     All other components within normal limits    Narrative:     Specimen Source->Urine   B-TYPE NATRIURETIC PEPTIDE   CK   TROPONIN I        All Lab Results:  Results for orders placed or performed during the hospital encounter of 08/02/23   CBC Auto Differential   Result Value Ref Range    WBC 11.96 3.90 - 12.70 K/uL    RBC 4.37 4.00 - 5.40 M/uL    Hemoglobin 9.6 (L) 12.0 - 16.0 g/dL    Hematocrit 32.0 (L) 37.0 - 48.5 %    MCV 73 (L) 82 - 98 fL    MCH 22.0 (L) 27.0 - 31.0 pg    MCHC 30.0 (L) 32.0 - 36.0 g/dL    RDW 14.5 11.5 - 14.5 %    Platelets 300 150 - 450 K/uL    MPV 10.5 9.2 - 12.9 fL    Immature Granulocytes 0.4 0.0 - 0.5 %    Gran # (ANC) 8.3 (H) 1.8 - 7.7 K/uL    Immature Grans (Abs) 0.05 (H) 0.00 - 0.04 K/uL    Lymph # 2.9 1.0 - 4.8 K/uL    Mono # 0.5 0.3 - 1.0 K/uL    Eos # 0.2 0.0 - 0.5 K/uL    Baso # 0.03 0.00 - 0.20 K/uL    nRBC 0 0 /100 WBC    Gran % 69.2 38.0 - 73.0 %    Lymph % 24.2 18.0 - 48.0 %    Mono % 4.5 4.0 - 15.0 %    Eosinophil % 1.4 0.0 - 8.0 %    Basophil % 0.3 0.0 - 1.9 %    Differential Method Automated    Comprehensive Metabolic Panel    Result Value Ref Range    Sodium 143 136 - 145 mmol/L    Potassium 3.5 3.5 - 5.1 mmol/L    Chloride 104 95 - 110 mmol/L    CO2 27 23 - 29 mmol/L    Glucose 119 (H) 70 - 110 mg/dL    BUN 12 6 - 20 mg/dL    Creatinine 1.1 0.5 - 1.4 mg/dL    Calcium 9.8 8.7 - 10.5 mg/dL    Total Protein 7.9 6.0 - 8.4 g/dL    Albumin 3.6 3.5 - 5.2 g/dL    Total Bilirubin 0.5 0.1 - 1.0 mg/dL    Alkaline Phosphatase 73 55 - 135 U/L    AST 13 10 - 40 U/L    ALT 9 (L) 10 - 44 U/L    eGFR >60 >60 mL/min/1.73 m^2    Anion Gap 12 8 - 16 mmol/L   Urinalysis, Reflex to Urine Culture Urine, Clean Catch    Specimen: Urine   Result Value Ref Range    Specimen UA Urine, Clean Catch     Color, UA Yellow Yellow, Straw, Veronica    Appearance, UA Hazy (A) Clear    pH, UA 6.0 5.0 - 8.0    Specific Gravity, UA 1.025 1.005 - 1.030    Protein, UA 1+ (A) Negative    Glucose, UA Negative Negative    Ketones, UA Negative Negative    Bilirubin (UA) Negative Negative    Occult Blood UA Negative Negative    Nitrite, UA Negative Negative    Urobilinogen, UA 2.0-3.0 (A) <2.0 EU/dL    Leukocytes, UA Negative Negative   BNP   Result Value Ref Range    BNP 23 0 - 99 pg/mL   CK   Result Value Ref Range    CPK 83 20 - 180 U/L   Troponin I   Result Value Ref Range    Troponin I <0.006 0.000 - 0.026 ng/mL   Urinalysis Microscopic   Result Value Ref Range    RBC, UA 0 0 - 4 /hpf    WBC, UA 1 0 - 5 /hpf    Bacteria Rare None-Occ /hpf    Squam Epithel, UA 13 /hpf    Hyaline Casts, UA 6 (A) 0-1/lpf /lpf    Microscopic Comment SEE COMMENT          Imaging Results:  Imaging Results              X-Ray Chest AP Portable (Final result)  Result time 08/02/23 14:17:51      Final result by Smith Santiago III, MD (08/02/23 14:17:51)                   Impression:      Cardiomegaly.      Electronically signed by: Milind Santiago  Date:    08/02/2023  Time:    14:17               Narrative:    EXAMINATION:  XR CHEST AP PORTABLE    CLINICAL HISTORY:  chest  pain;    TECHNIQUE:  Single frontal view of the chest was performed.    COMPARISON:  None    FINDINGS:  The cardiac silhouette appears enlarged.  The mediastinum is unremarkable.  No pneumothorax, pleural effusion or acute infiltrate.  No vascular congestion.                                       The EKG was ordered, reviewed, and independently interpreted by the ED provider.  Interpretation time: 13:25  Rate: 80 BPM  Rhythm: normal sinus rhythm  Interpretation: Left ventricular hypertrophy with repolarization abnormality (R in aVL, Hurlock product, Romhilt-Ty). Abnormal ECG. No STEMI.           The Emergency Provider reviewed the vital signs and test results, which are outlined above.     ED Discussion     2:58 PM: Reassessed pt at this time. Discussed with pt all pertinent ED information and results. Discussed pt dx and plan of tx. Gave pt all f/u and return to the ED instructions. All questions and concerns were addressed at this time. Pt expresses understanding of information and instructions, and is comfortable with plan to discharge. Pt is stable for discharge.    I discussed with patient and/or family/caretaker that evaluation in the ED does not suggest any emergent or life threatening medical conditions requiring immediate intervention beyond what was provided in the ED, and I believe patient is safe for discharge.  Regardless, an unremarkable evaluation in the ED does not preclude the development or presence of a serious of life threatening condition. As such, patient was instructed to return immediately for any worsening or change in current symptoms.       Medical Decision Making:   Initial Assessment:   Chest pain for the last day. Denies sob  Differential Diagnosis:   Chest pain, NSTEMI  Clinical Tests:   Lab Tests: Ordered and Reviewed  Radiological Study: Ordered and Reviewed  Medical Tests: Ordered and Reviewed  ED Management:  Labs and imaging reviewed by me.  No acute findings except for  elevated blood pressure.  Considered admission, but patient is negative for ischemia, and is feeling better and ready to go home.             ED Medication(s):  Medications   hydrALAZINE injection 10 mg (has no administration in time range)       New Prescriptions    No medications on file        Follow-up Information       [facility] In 2 days.    Contact information:  [address] [facility] [address]  [phone]                                 Scribe Attestation:   Scribe #1: I performed the above scribed service and the documentation accurately describes the services I performed. I attest to the accuracy of the note.     Attending:   Physician Attestation Statement for Scribe #1: I, Marshall Fuentes MD, personally performed the services described in this documentation, as scribed by Carmelita Ba, in my presence, and it is both accurate and complete.           Clinical Impression       ICD-10-CM ICD-9-CM   1. Chest pain  R07.9 786.50   2. Hypertension, unspecified type  I10 401.9       Disposition:   Disposition: Discharged  Condition: Stable         Marshall Fuentes MD  08/02/23 0040

## 2023-08-07 PROBLEM — R65.20 SEVERE SEPSIS: Status: RESOLVED | Noted: 2023-05-04 | Resolved: 2023-08-07

## 2023-08-07 PROBLEM — N17.9 AKI (ACUTE KIDNEY INJURY): Status: RESOLVED | Noted: 2023-05-04 | Resolved: 2023-08-07

## 2023-08-07 PROBLEM — A41.9 SEVERE SEPSIS: Status: RESOLVED | Noted: 2023-05-04 | Resolved: 2023-08-07

## 2023-08-10 DIAGNOSIS — D64.9 ANEMIA, UNSPECIFIED TYPE: Primary | ICD-10-CM

## 2023-08-10 RX ORDER — FERROUS SULFATE 325(65) MG
325 TABLET, DELAYED RELEASE (ENTERIC COATED) ORAL 2 TIMES DAILY
Qty: 60 TABLET | Refills: 3 | Status: SHIPPED | OUTPATIENT
Start: 2023-08-10 | End: 2023-09-09

## 2023-08-13 PROBLEM — Z13.6 ENCOUNTER FOR LIPID SCREENING FOR CARDIOVASCULAR DISEASE: Status: ACTIVE | Noted: 2023-08-13

## 2023-08-13 PROBLEM — Z13.220 ENCOUNTER FOR LIPID SCREENING FOR CARDIOVASCULAR DISEASE: Status: ACTIVE | Noted: 2023-08-13

## 2023-08-14 PROBLEM — I63.9 CEREBROVASCULAR ACCIDENT (CVA) DUE TO EMBOLISM: Status: RESOLVED | Noted: 2023-05-05 | Resolved: 2023-08-14

## 2023-08-14 NOTE — PROGRESS NOTES
Subjective:       Patient ID: Kenia Roper is a 48 y.o. female.    Chief Complaint: Follow-up (Hospital f/u )      History of Present Illness:   Kenia Roper 48 y.o. female presents today for medication management for elevated blood pressure readings and ER visit associated with blood pressures. Patient instructed to follow treatment plan as directed. Treatment options and alternatives were discussed with the patient. Patient provided opportunity to ask additional questions.  All questions were answered. Voices understanding and acceptance of this advice. Instructed to call back if any further questions or concerns.      Past Medical History:   Diagnosis Date    Diabetes mellitus     Ovarian cancer     Stroke      History reviewed. No pertinent family history.  Social History     Socioeconomic History    Marital status: Unknown   Tobacco Use    Smoking status: Former     Current packs/day: 1.00     Average packs/day: 1 pack/day for 0.1 years (0.1 ttl pk-yrs)     Types: Cigarettes     Start date: 7/4/2023     Social Determinants of Health     Financial Resource Strain: Low Risk  (8/2/2023)    Overall Financial Resource Strain (CARDIA)     Difficulty of Paying Living Expenses: Not hard at all   Food Insecurity: No Food Insecurity (8/2/2023)    Hunger Vital Sign     Worried About Running Out of Food in the Last Year: Never true     Ran Out of Food in the Last Year: Never true   Transportation Needs: No Transportation Needs (8/2/2023)    PRAPARE - Transportation     Lack of Transportation (Medical): No     Lack of Transportation (Non-Medical): No   Physical Activity: Inactive (8/2/2023)    Exercise Vital Sign     Days of Exercise per Week: 0 days     Minutes of Exercise per Session: 0 min   Stress: Stress Concern Present (8/2/2023)    [employer] [place] [employer] - Occupational Stress Questionnaire     Feeling of Stress : Rather much   Social Connections: Socially Isolated (8/2/2023)    Social Connection  and Isolation Panel [NHANES]     Frequency of Communication with Friends and Family: More than three times a week     Frequency of Social Gatherings with Friends and Family: More than three times a week     Attends Christianity Services: Never     Active Member of Clubs or Organizations: No     Attends Club or Organization Meetings: Never     Marital Status:    Housing Stability: Low Risk  (8/2/2023)    Housing Stability Vital Sign     Unable to Pay for Housing in the Last Year: No     Number of Places Lived in the Last Year: 1     Unstable Housing in the Last Year: No     Outpatient Encounter Medications as of 8/2/2023   Medication Sig Dispense Refill    amLODIPine (NORVASC) 10 MG tablet Take 1 tablet (10 mg total) by mouth once daily. 90 tablet 0    aspirin 81 MG Chew Chew and swallow 1 tablet (81 mg total) by mouth once daily. 90 tablet 0    atorvastatin (LIPITOR) 80 MG tablet Take 1 tablet (80 mg total) by mouth every evening. 90 tablet 0    blood sugar diagnostic Strp Use to test blood sugar twice a day with meals 100 strip 0    blood-glucose meter Misc Use to check blood sugar twice a day as directed 1 each 0    clopidogreL (PLAVIX) 75 mg tablet Take 1 tablet (75 mg total) by mouth once daily. for 21 days 21 tablet 0    hydroCHLOROthiazide (HYDRODIURIL) 25 MG tablet Take 1 tablet (25 mg total) by mouth once daily. 90 tablet 0    lancets Misc Use to check blood sugar twice a day with meals 100 each 0    lancing device Misc 1 Device by Misc.(Non-Drug; Combo Route) route 2 (two) times daily with meals. 1 each 0    losartan (COZAAR) 100 MG tablet Take 1 tablet (100 mg total) by mouth once daily. 90 tablet 3    metFORMIN (GLUCOPHAGE) 500 MG tablet Take 1 tablet (500 mg total) by mouth 2 (two) times daily with meals. 180 tablet 0    [DISCONTINUED] losartan (COZAAR) 50 MG tablet Take 1 tablet (50 mg total) by mouth once daily. 90 tablet 0     No facility-administered encounter medications on file as of 8/2/2023.  "      Review of Systems   Constitutional:  Negative for appetite change, chills and fever.   HENT:  Negative for ear pain, sinus pressure, sore throat and trouble swallowing.    Eyes:  Negative for visual disturbance.   Respiratory:  Negative for shortness of breath.    Cardiovascular:  Negative for chest pain.   Gastrointestinal:  Negative for abdominal pain, diarrhea, nausea and vomiting.   Endocrine: Negative for cold intolerance, polyphagia and polyuria.   Genitourinary:  Negative for decreased urine volume and dysuria.   Musculoskeletal:  Negative for back pain.   Skin:  Negative for rash.   Allergic/Immunologic: Negative for environmental allergies and food allergies.   Neurological:  Negative for dizziness, tremors, weakness and numbness.   Hematological:  Does not bruise/bleed easily.   Psychiatric/Behavioral:  Negative for confusion and hallucinations. The patient is not nervous/anxious and is not hyperactive.    All other systems reviewed and are negative.      Objective:      BP (!) 178/118 (BP Location: Left arm, Patient Position: Sitting, BP Method: Large (Manual))   Pulse 83   Temp 97.3 °F (36.3 °C) (Oral)   Ht 5' 4" (1.626 m)   Wt 117.2 kg (258 lb 6.4 oz)   SpO2 97%   BMI 44.35 kg/m²   Physical Exam  Vitals and nursing note reviewed.   Constitutional:       General: She is not in acute distress.     Appearance: Normal appearance. She is normal weight. She is not ill-appearing or toxic-appearing.   Cardiovascular:      Rate and Rhythm: Normal rate and regular rhythm.      Pulses: Normal pulses.      Heart sounds: Normal heart sounds.   Pulmonary:      Effort: Pulmonary effort is normal.      Breath sounds: Normal breath sounds.   Neurological:      Mental Status: She is alert.         Results for orders placed or performed in visit on 08/02/23   MICROALBUMIN / CREATININE RATIO URINE   Result Value Ref Range    Microalbumin, Urine 53.0 ug/mL    Creatinine, Urine 143.0 15.0 - 325.0 mg/dL    " Microalb/Creat Ratio 37.1 (H) 0.0 - 30.0 ug/mg     Assessment:       1. Type 2 diabetes mellitus with hyperglycemia, without long-term current use of insulin    2. Encounter for lipid screening for cardiovascular disease    3. History of metabolic and nutritional disorder    4. Essential hypertension    5. Chest pain, unspecified type    6. Screening for HIV (human immunodeficiency virus)    7. Encounter for hepatitis C screening test for low risk patient    8. Screening for cervical cancer    9. Encounter for diabetic foot exam    10. History of MI (myocardial infarction)    11. Colon cancer screening        Plan:   Kenia was seen today for follow-up.    Diagnoses and all orders for this visit:    Type 2 diabetes mellitus with hyperglycemia, without long-term current use of insulin  -     CBC Auto Differential; Future  -     Comprehensive Metabolic Panel; Future  -     Lipid Panel; Future  -     Hemoglobin A1C; Future  -     TSH; Future  -     T3, Free; Future  -     T4, Free; Future  -     MICROALBUMIN / CREATININE RATIO URINE  -     Ambulatory referral/consult to Ophthalmology; Future    Encounter for lipid screening for cardiovascular disease  -     Lipid Panel; Future    History of metabolic and nutritional disorder    Essential hypertension  -     CBC Auto Differential; Future  -     Comprehensive Metabolic Panel; Future    Chest pain, unspecified type  -     EKG 12-lead; Future  -     Ambulatory referral/consult to Cardiology; Future  -     EKG 12-lead    Screening for HIV (human immunodeficiency virus)  -     HIV 1/2 Ag/Ab (4th Gen); Future    Encounter for hepatitis C screening test for low risk patient  -     Hepatitis C Antibody; Future    Screening for cervical cancer  -     Ambulatory referral/consult to Gynecology; Future    Encounter for diabetic foot exam  -     Foot Exam Performed    History of MI (myocardial infarction)  -     Ambulatory referral/consult to Cardiology; Future    Colon cancer  screening  -     Ambulatory referral/consult to Endo Procedure ; Future [facility]   [address]  [place] [address]  Office [phone]  Fax [phone]

## 2023-08-16 DIAGNOSIS — Z12.31 OTHER SCREENING MAMMOGRAM: ICD-10-CM

## 2023-08-23 ENCOUNTER — TELEPHONE (OUTPATIENT)
Dept: PRIMARY CARE CLINIC | Facility: CLINIC | Age: 48
End: 2023-08-23
Payer: MEDICAID

## 2023-08-23 NOTE — TELEPHONE ENCOUNTER
Called the patient to inform her of her labs no answer LVM.    ----- Message from Yu Solis DNP sent at 8/10/2023 12:03 PM CDT -----  Please contact the patient and let them know that their results were as follows:  Good afternoon,your lipid panel which your cholesterol was elevated. Lets try some lifestyle modifications which include: Exercise 3-4 days out the week in at least 30-45 min intervals, follow a low fat/cholesterol diet and we will repeat level in 3 months. Continue to take your cholesterol medication as directed.    Your HGA1c has improved. Great Job    Anemia- The results of the CBC shows anemia. I'm going to send in over Ferrous Sulfate 325 mg three times daily.

## 2023-08-28 ENCOUNTER — TELEPHONE (OUTPATIENT)
Dept: PRIMARY CARE CLINIC | Facility: CLINIC | Age: 48
End: 2023-08-28
Payer: MEDICAID

## 2023-08-28 NOTE — TELEPHONE ENCOUNTER
Returned the call no answer Lvm.          ----- Message from Bernice Ochoa sent at 8/28/2023  8:33 AM CDT -----  Contact: [phone]  Patient is returning a phone call.  Who left a message for the patient: Yajaira    Does patient know what this is regarding:  yes, Yajaira Morley MA     DT    8/23/23  4:33 PM  Note  Called the patient to inform her of her labs no answer LVM.     ----- Message from Yu Solis DNP sent at 8/10/2023 12:03 PM CDT -----  Please contact the patient and let them know that their results were as follows:  Good afternoon,your lipid panel which your cholesterol was elevated. Lets try some lifestyle modifications which include: Exercise 3-4 days out the week in at least 30-45 min intervals, follow a low fat/cholesterol diet and we will repeat level in 3 months. Continue to take your cholesterol medication as directed.     Your HGA1c has improved. Great Job     Anemia- The results of the CBC shows anemia. I'm going to send in over Ferrous Sulfate 325 mg three times daily.         Would you like a call back, or a response through your [facility] portal?:   Callback @ # [phone]

## 2023-09-05 ENCOUNTER — TELEPHONE (OUTPATIENT)
Dept: CARDIOLOGY | Facility: HOSPITAL | Age: 48
End: 2023-09-05
Payer: MEDICAID

## 2023-09-05 NOTE — TELEPHONE ENCOUNTER
At this time, Ms Roper does not wish to proceed with nuclear stress test nor cardiac event monitor.

## 2023-10-02 ENCOUNTER — TELEPHONE (OUTPATIENT)
Dept: PRIMARY CARE CLINIC | Facility: CLINIC | Age: 48
End: 2023-10-02
Payer: MEDICAID

## 2023-10-02 NOTE — TELEPHONE ENCOUNTER
Returned the patient call no answer Left a Voicemail.       ----- Message from Brentpenelope Kwadwo sent at 10/2/2023 10:29 AM CDT -----  Contact: self [phone]  Patient would like to get a referral.  Referral to what specialty:  nephrologist  Does the patient want the referral with a specific physician:    [facility]  Reason (be specific):  kidney disease  Does the patient already have the specialty clinic appointment scheduled:    If yes, what date is the appointment scheduled:     Is the insurance listed in Epic correct? (this is important for a referral): yes   Advised patient that once provider approves this either a nurse or  will return their call?:   Would the patient like a call back, or a response through their [facility] portal?:   call back  Comments:       Please call and advise

## 2023-11-13 PROBLEM — Z13.220 ENCOUNTER FOR LIPID SCREENING FOR CARDIOVASCULAR DISEASE: Status: RESOLVED | Noted: 2023-08-13 | Resolved: 2023-11-13

## 2023-11-13 PROBLEM — Z13.6 ENCOUNTER FOR LIPID SCREENING FOR CARDIOVASCULAR DISEASE: Status: RESOLVED | Noted: 2023-08-13 | Resolved: 2023-11-13

## 2023-12-18 ENCOUNTER — HOSPITAL ENCOUNTER (OUTPATIENT)
Dept: PREADMISSION TESTING | Facility: HOSPITAL | Age: 48
Discharge: HOME | End: 2023-12-18
Attending: INTERNAL MEDICINE
Payer: MEDICAID

## 2023-12-18 DIAGNOSIS — Z12.11 COLON CANCER SCREENING: ICD-10-CM

## 2024-02-14 DIAGNOSIS — E11.9 TYPE 2 DIABETES MELLITUS WITHOUT COMPLICATION: ICD-10-CM

## 2024-03-28 ENCOUNTER — DOCUMENT SCAN (OUTPATIENT)
Dept: HOME HEALTH SERVICES | Facility: HOSPITAL | Age: 49
End: 2024-03-28
Payer: MEDICAID

## 2024-04-25 PROBLEM — I10 HYPERTENSIVE DISORDER: Status: ACTIVE | Noted: 2024-04-25

## 2024-04-25 PROBLEM — L68.0 HIRSUTISM: Status: ACTIVE | Noted: 2018-12-11

## 2024-04-25 PROBLEM — E66.01 MORBID OBESITY: Status: ACTIVE | Noted: 2017-03-09

## 2024-04-25 PROBLEM — L21.9 SEBORRHEIC DERMATITIS: Status: ACTIVE | Noted: 2018-12-11

## 2024-04-25 PROBLEM — L70.0 ACNE VULGARIS: Status: ACTIVE | Noted: 2019-01-02

## 2024-04-25 PROBLEM — L81.1 MELASMA: Status: ACTIVE | Noted: 2018-12-11

## 2024-04-25 PROBLEM — L91.0 KELOID: Status: ACTIVE | Noted: 2019-01-02

## 2024-04-25 PROBLEM — G62.9 NEUROPATHY: Status: ACTIVE | Noted: 2017-03-09

## 2024-04-25 PROBLEM — D57.3 SICKLE CELL TRAIT: Status: ACTIVE | Noted: 2017-03-09

## 2024-06-05 ENCOUNTER — PATIENT MESSAGE (OUTPATIENT)
Dept: ADMINISTRATIVE | Facility: HOSPITAL | Age: 49
End: 2024-06-05
Payer: MEDICAID

## 2024-07-09 ENCOUNTER — PATIENT MESSAGE (OUTPATIENT)
Dept: ADMINISTRATIVE | Facility: HOSPITAL | Age: 49
End: 2024-07-09
Payer: MEDICAID
